# Patient Record
Sex: FEMALE | Race: WHITE | NOT HISPANIC OR LATINO | Employment: OTHER | URBAN - METROPOLITAN AREA
[De-identification: names, ages, dates, MRNs, and addresses within clinical notes are randomized per-mention and may not be internally consistent; named-entity substitution may affect disease eponyms.]

---

## 2017-01-21 ENCOUNTER — HOSPITAL ENCOUNTER (OUTPATIENT)
Dept: RADIOLOGY | Facility: MEDICAL CENTER | Age: 67
End: 2017-01-21
Attending: FAMILY MEDICINE
Payer: MEDICARE

## 2017-01-21 ENCOUNTER — HOSPITAL ENCOUNTER (OUTPATIENT)
Dept: LAB | Facility: MEDICAL CENTER | Age: 67
End: 2017-01-21
Attending: FAMILY MEDICINE
Payer: MEDICARE

## 2017-01-21 ENCOUNTER — HOSPITAL ENCOUNTER (OUTPATIENT)
Facility: MEDICAL CENTER | Age: 67
End: 2017-01-21
Attending: FAMILY MEDICINE
Payer: MEDICARE

## 2017-01-21 DIAGNOSIS — R05.9 COUGH: ICD-10-CM

## 2017-01-21 LAB
BASOPHILS # BLD AUTO: 0.02 K/UL (ref 0–0.12)
BASOPHILS NFR BLD AUTO: 0.3 % (ref 0–1.8)
EOSINOPHIL # BLD: 0.11 K/UL (ref 0–0.51)
EOSINOPHIL NFR BLD AUTO: 1.6 % (ref 0–6.9)
ERYTHROCYTE [DISTWIDTH] IN BLOOD BY AUTOMATED COUNT: 48.3 FL (ref 35.9–50)
FERRITIN SERPL-MCNC: 23.2 NG/ML (ref 10–291)
HCT VFR BLD AUTO: 44.7 % (ref 37–47)
HGB BLD-MCNC: 14.2 G/DL (ref 12–16)
IMM GRANULOCYTES # BLD AUTO: 0.01 K/UL (ref 0–0.11)
IMM GRANULOCYTES NFR BLD AUTO: 0.1 % (ref 0–0.9)
IRON SATN MFR SERPL: 21 % (ref 15–55)
IRON SERPL-MCNC: 85 UG/DL (ref 40–170)
LYMPHOCYTES # BLD: 2.04 K/UL (ref 1–4.8)
LYMPHOCYTES NFR BLD AUTO: 30.4 % (ref 22–41)
MCH RBC QN AUTO: 29.9 PG (ref 27–33)
MCHC RBC AUTO-ENTMCNC: 31.8 G/DL (ref 33.6–35)
MCV RBC AUTO: 94.1 FL (ref 81.4–97.8)
MONOCYTES # BLD: 0.42 K/UL (ref 0–0.85)
MONOCYTES NFR BLD AUTO: 6.3 % (ref 0–13.4)
NEUTROPHILS # BLD: 4.1 K/UL (ref 2–7.15)
NEUTROPHILS NFR BLD AUTO: 61.3 % (ref 44–72)
NRBC # BLD AUTO: 0 K/UL
NRBC BLD-RTO: 0 /100 WBC
PLATELET # BLD AUTO: 187 K/UL (ref 164–446)
PMV BLD AUTO: 10.9 FL (ref 9–12.9)
RBC # BLD AUTO: 4.75 M/UL (ref 4.2–5.4)
TIBC SERPL-MCNC: 412 UG/DL (ref 250–450)
VIT B12 SERPL-MCNC: 1071 PG/ML (ref 211–911)
WBC # BLD AUTO: 6.7 K/UL (ref 4.8–10.8)

## 2017-01-21 PROCEDURE — 36415 COLL VENOUS BLD VENIPUNCTURE: CPT

## 2017-01-21 PROCEDURE — 82728 ASSAY OF FERRITIN: CPT

## 2017-01-21 PROCEDURE — 83550 IRON BINDING TEST: CPT

## 2017-01-21 PROCEDURE — 71020 DX-CHEST-2 VIEWS: CPT

## 2017-01-21 PROCEDURE — 82274 ASSAY TEST FOR BLOOD FECAL: CPT

## 2017-01-21 PROCEDURE — 85025 COMPLETE CBC W/AUTO DIFF WBC: CPT

## 2017-01-21 PROCEDURE — 83540 ASSAY OF IRON: CPT

## 2017-01-21 PROCEDURE — 82607 VITAMIN B-12: CPT

## 2017-01-26 LAB — HEMOCCULT STL QL IA: NEGATIVE

## 2018-01-04 ENCOUNTER — APPOINTMENT (OUTPATIENT)
Dept: RADIOLOGY | Facility: MEDICAL CENTER | Age: 68
End: 2018-01-04
Attending: NURSE PRACTITIONER
Payer: MEDICARE

## 2018-01-19 ENCOUNTER — HOSPITAL ENCOUNTER (OUTPATIENT)
Dept: LAB | Facility: MEDICAL CENTER | Age: 68
End: 2018-01-19
Attending: NURSE PRACTITIONER
Payer: MEDICARE

## 2018-01-19 ENCOUNTER — HOSPITAL ENCOUNTER (OUTPATIENT)
Dept: RADIOLOGY | Facility: MEDICAL CENTER | Age: 68
End: 2018-01-19
Attending: NURSE PRACTITIONER
Payer: MEDICARE

## 2018-01-19 DIAGNOSIS — F06.30 POSTMENOPAUSAL RELATED MOOD DISORDER: ICD-10-CM

## 2018-01-19 DIAGNOSIS — Z87.891 PERSONAL HISTORY OF TOBACCO USE, PRESENTING HAZARDS TO HEALTH: ICD-10-CM

## 2018-01-19 DIAGNOSIS — N95.8 POSTMENOPAUSAL RELATED MOOD DISORDER: ICD-10-CM

## 2018-01-19 LAB
ALBUMIN SERPL BCP-MCNC: 2.8 G/DL (ref 3.2–4.9)
ALBUMIN/GLOB SERPL: 0.9 G/DL
ALP SERPL-CCNC: 83 U/L (ref 30–99)
ALT SERPL-CCNC: 11 U/L (ref 2–50)
ANION GAP SERPL CALC-SCNC: 7 MMOL/L (ref 0–11.9)
AST SERPL-CCNC: 11 U/L (ref 12–45)
BASOPHILS # BLD AUTO: 0.5 % (ref 0–1.8)
BASOPHILS # BLD: 0.03 K/UL (ref 0–0.12)
BILIRUB SERPL-MCNC: 0.4 MG/DL (ref 0.1–1.5)
BUN SERPL-MCNC: 11 MG/DL (ref 8–22)
CALCIUM SERPL-MCNC: 8.8 MG/DL (ref 8.5–10.5)
CHLORIDE SERPL-SCNC: 102 MMOL/L (ref 96–112)
CHOLEST SERPL-MCNC: 137 MG/DL (ref 100–199)
CO2 SERPL-SCNC: 27 MMOL/L (ref 20–33)
CREAT SERPL-MCNC: 0.64 MG/DL (ref 0.5–1.4)
CREAT UR-MCNC: 64.4 MG/DL
EOSINOPHIL # BLD AUTO: 0.12 K/UL (ref 0–0.51)
EOSINOPHIL NFR BLD: 1.9 % (ref 0–6.9)
ERYTHROCYTE [DISTWIDTH] IN BLOOD BY AUTOMATED COUNT: 48.8 FL (ref 35.9–50)
EST. AVERAGE GLUCOSE BLD GHB EST-MCNC: 209 MG/DL
GLOBULIN SER CALC-MCNC: 3.2 G/DL (ref 1.9–3.5)
GLUCOSE SERPL-MCNC: 208 MG/DL (ref 65–99)
HBA1C MFR BLD: 8.9 % (ref 0–5.6)
HCT VFR BLD AUTO: 42.8 % (ref 37–47)
HCV AB SER QL: NEGATIVE
HDLC SERPL-MCNC: 56 MG/DL
HGB BLD-MCNC: 13.8 G/DL (ref 12–16)
IMM GRANULOCYTES # BLD AUTO: 0.03 K/UL (ref 0–0.11)
IMM GRANULOCYTES NFR BLD AUTO: 0.5 % (ref 0–0.9)
LDLC SERPL CALC-MCNC: 69 MG/DL
LYMPHOCYTES # BLD AUTO: 1.48 K/UL (ref 1–4.8)
LYMPHOCYTES NFR BLD: 23.8 % (ref 22–41)
MCH RBC QN AUTO: 29.7 PG (ref 27–33)
MCHC RBC AUTO-ENTMCNC: 32.2 G/DL (ref 33.6–35)
MCV RBC AUTO: 92.2 FL (ref 81.4–97.8)
MICROALBUMIN UR-MCNC: 0.7 MG/DL
MICROALBUMIN/CREAT UR: 11 MG/G (ref 0–30)
MONOCYTES # BLD AUTO: 0.32 K/UL (ref 0–0.85)
MONOCYTES NFR BLD AUTO: 5.1 % (ref 0–13.4)
NEUTROPHILS # BLD AUTO: 4.24 K/UL (ref 2–7.15)
NEUTROPHILS NFR BLD: 68.2 % (ref 44–72)
NRBC # BLD AUTO: 0 K/UL
NRBC BLD-RTO: 0 /100 WBC
PLATELET # BLD AUTO: 133 K/UL (ref 164–446)
PMV BLD AUTO: 11.6 FL (ref 9–12.9)
POTASSIUM SERPL-SCNC: 4.4 MMOL/L (ref 3.6–5.5)
PROT SERPL-MCNC: 6 G/DL (ref 6–8.2)
RBC # BLD AUTO: 4.64 M/UL (ref 4.2–5.4)
SODIUM SERPL-SCNC: 136 MMOL/L (ref 135–145)
T4 FREE SERPL-MCNC: 0.83 NG/DL (ref 0.53–1.43)
TRIGL SERPL-MCNC: 61 MG/DL (ref 0–149)
TSH SERPL DL<=0.005 MIU/L-ACNC: 0.48 UIU/ML (ref 0.38–5.33)
WBC # BLD AUTO: 6.2 K/UL (ref 4.8–10.8)

## 2018-01-19 PROCEDURE — 83036 HEMOGLOBIN GLYCOSYLATED A1C: CPT

## 2018-01-19 PROCEDURE — 82570 ASSAY OF URINE CREATININE: CPT

## 2018-01-19 PROCEDURE — 85025 COMPLETE CBC W/AUTO DIFF WBC: CPT

## 2018-01-19 PROCEDURE — 86803 HEPATITIS C AB TEST: CPT

## 2018-01-19 PROCEDURE — 84443 ASSAY THYROID STIM HORMONE: CPT

## 2018-01-19 PROCEDURE — 76775 US EXAM ABDO BACK WALL LIM: CPT

## 2018-01-19 PROCEDURE — 36415 COLL VENOUS BLD VENIPUNCTURE: CPT

## 2018-01-19 PROCEDURE — 80061 LIPID PANEL: CPT

## 2018-01-19 PROCEDURE — 80053 COMPREHEN METABOLIC PANEL: CPT

## 2018-01-19 PROCEDURE — 82043 UR ALBUMIN QUANTITATIVE: CPT

## 2018-01-19 PROCEDURE — 84439 ASSAY OF FREE THYROXINE: CPT

## 2018-01-25 ENCOUNTER — HOSPITAL ENCOUNTER (OUTPATIENT)
Dept: RADIOLOGY | Facility: MEDICAL CENTER | Age: 68
End: 2018-01-25
Attending: NURSE PRACTITIONER
Payer: MEDICARE

## 2018-01-25 DIAGNOSIS — Z87.891 PERSONAL HISTORY OF TOBACCO USE, PRESENTING HAZARDS TO HEALTH: ICD-10-CM

## 2018-01-25 DIAGNOSIS — N95.8 POSTMENOPAUSAL RELATED MOOD DISORDER: ICD-10-CM

## 2018-01-25 DIAGNOSIS — F06.30 POSTMENOPAUSAL RELATED MOOD DISORDER: ICD-10-CM

## 2018-01-25 PROCEDURE — 77080 DXA BONE DENSITY AXIAL: CPT

## 2018-06-27 ENCOUNTER — HOSPITAL ENCOUNTER (OUTPATIENT)
Dept: RADIOLOGY | Facility: MEDICAL CENTER | Age: 68
End: 2018-06-27
Attending: NURSE PRACTITIONER
Payer: MEDICARE

## 2018-06-27 DIAGNOSIS — M25.572 LEFT ANKLE PAIN, UNSPECIFIED CHRONICITY: ICD-10-CM

## 2018-06-27 DIAGNOSIS — M79.672 LEFT FOOT PAIN: ICD-10-CM

## 2018-06-27 PROCEDURE — 73610 X-RAY EXAM OF ANKLE: CPT | Mod: LT

## 2018-06-27 PROCEDURE — 73630 X-RAY EXAM OF FOOT: CPT | Mod: LT

## 2018-09-15 ENCOUNTER — OFFICE VISIT (OUTPATIENT)
Dept: URGENT CARE | Facility: PHYSICIAN GROUP | Age: 68
End: 2018-09-15
Payer: MEDICARE

## 2018-09-15 ENCOUNTER — HOSPITAL ENCOUNTER (OUTPATIENT)
Dept: RADIOLOGY | Facility: MEDICAL CENTER | Age: 68
End: 2018-09-15
Attending: FAMILY MEDICINE
Payer: MEDICARE

## 2018-09-15 VITALS
OXYGEN SATURATION: 91 % | HEART RATE: 68 BPM | WEIGHT: 195 LBS | TEMPERATURE: 97.7 F | BODY MASS INDEX: 29.55 KG/M2 | DIASTOLIC BLOOD PRESSURE: 70 MMHG | HEIGHT: 68 IN | RESPIRATION RATE: 24 BRPM | SYSTOLIC BLOOD PRESSURE: 132 MMHG

## 2018-09-15 DIAGNOSIS — J45.41 MODERATE PERSISTENT ASTHMA WITH EXACERBATION: ICD-10-CM

## 2018-09-15 DIAGNOSIS — J44.1 COPD EXACERBATION (HCC): ICD-10-CM

## 2018-09-15 DIAGNOSIS — R09.02 HYPOXIA: ICD-10-CM

## 2018-09-15 PROCEDURE — 94640 AIRWAY INHALATION TREATMENT: CPT | Performed by: FAMILY MEDICINE

## 2018-09-15 PROCEDURE — 71046 X-RAY EXAM CHEST 2 VIEWS: CPT

## 2018-09-15 PROCEDURE — 99214 OFFICE O/P EST MOD 30 MIN: CPT | Mod: 25 | Performed by: FAMILY MEDICINE

## 2018-09-15 RX ORDER — LEVOTHYROXINE SODIUM 0.07 MG/1
TABLET ORAL
COMMUNITY
Start: 2018-09-01 | End: 2020-07-27 | Stop reason: SDUPTHER

## 2018-09-15 RX ORDER — CARBAMAZEPINE 200 MG/1
TABLET ORAL
COMMUNITY
Start: 2018-08-08 | End: 2019-06-05 | Stop reason: SINTOL

## 2018-09-15 RX ORDER — PREDNISONE 20 MG/1
40 TABLET ORAL DAILY
Qty: 10 TAB | Refills: 0 | Status: SHIPPED | OUTPATIENT
Start: 2018-09-15 | End: 2018-09-20

## 2018-09-15 RX ORDER — IPRATROPIUM BROMIDE AND ALBUTEROL SULFATE 2.5; .5 MG/3ML; MG/3ML
3 SOLUTION RESPIRATORY (INHALATION) ONCE
Status: COMPLETED | OUTPATIENT
Start: 2018-09-15 | End: 2018-09-15

## 2018-09-15 RX ORDER — IPRATROPIUM BROMIDE AND ALBUTEROL SULFATE 2.5; .5 MG/3ML; MG/3ML
3 SOLUTION RESPIRATORY (INHALATION) EVERY 6 HOURS PRN
Qty: 25 BULLET | Refills: 0 | Status: SHIPPED | OUTPATIENT
Start: 2018-09-15 | End: 2024-01-10 | Stop reason: SDUPTHER

## 2018-09-15 RX ORDER — METHYLPREDNISOLONE SODIUM SUCCINATE 125 MG/2ML
125 INJECTION, POWDER, LYOPHILIZED, FOR SOLUTION INTRAMUSCULAR; INTRAVENOUS ONCE
Status: COMPLETED | OUTPATIENT
Start: 2018-09-15 | End: 2018-09-15

## 2018-09-15 RX ORDER — GABAPENTIN 300 MG/1
CAPSULE ORAL
COMMUNITY
Start: 2018-09-06 | End: 2019-06-05

## 2018-09-15 RX ORDER — OMEPRAZOLE 40 MG/1
CAPSULE, DELAYED RELEASE ORAL
COMMUNITY
Start: 2018-06-29 | End: 2020-10-28 | Stop reason: CLARIF

## 2018-09-15 RX ORDER — CAPTOPRIL 12.5 MG/1
12.5 TABLET ORAL DAILY
COMMUNITY
Start: 2018-06-29 | End: 2022-10-14

## 2018-09-15 RX ORDER — CIPROFLOXACIN 500 MG/1
TABLET, FILM COATED ORAL
COMMUNITY
Start: 2018-09-01 | End: 2019-06-05

## 2018-09-15 RX ORDER — DOXYCYCLINE HYCLATE 100 MG
100 TABLET ORAL 2 TIMES DAILY
Qty: 20 TAB | Refills: 0 | Status: SHIPPED | OUTPATIENT
Start: 2018-09-15 | End: 2018-09-25

## 2018-09-15 RX ADMIN — IPRATROPIUM BROMIDE AND ALBUTEROL SULFATE 3 ML: 2.5; .5 SOLUTION RESPIRATORY (INHALATION) at 14:26

## 2018-09-15 RX ADMIN — METHYLPREDNISOLONE SODIUM SUCCINATE 125 MG: 125 INJECTION, POWDER, LYOPHILIZED, FOR SOLUTION INTRAMUSCULAR; INTRAVENOUS at 14:38

## 2018-09-15 ASSESSMENT — ENCOUNTER SYMPTOMS
EYE REDNESS: 0
EYE DISCHARGE: 0
WEIGHT LOSS: 0

## 2018-09-15 ASSESSMENT — PAIN SCALES - GENERAL: PAINLEVEL: NO PAIN
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
## 2019-06-04 ENCOUNTER — HOSPITAL ENCOUNTER (OUTPATIENT)
Dept: LAB | Facility: MEDICAL CENTER | Age: 69
End: 2019-06-04
Attending: FAMILY MEDICINE
Payer: MEDICARE

## 2019-06-04 LAB
ALBUMIN SERPL BCP-MCNC: 3.9 G/DL (ref 3.2–4.9)
ALBUMIN/GLOB SERPL: 1.6 G/DL
ALP SERPL-CCNC: 90 U/L (ref 30–99)
ALT SERPL-CCNC: 15 U/L (ref 2–50)
ANION GAP SERPL CALC-SCNC: 8 MMOL/L (ref 0–11.9)
APPEARANCE UR: CLEAR
AST SERPL-CCNC: 14 U/L (ref 12–45)
BASOPHILS # BLD AUTO: 0.6 % (ref 0–1.8)
BASOPHILS # BLD: 0.04 K/UL (ref 0–0.12)
BILIRUB SERPL-MCNC: 0.6 MG/DL (ref 0.1–1.5)
BILIRUB UR QL STRIP.AUTO: NEGATIVE
BUN SERPL-MCNC: 12 MG/DL (ref 8–22)
CALCIUM SERPL-MCNC: 9 MG/DL (ref 8.5–10.5)
CHLORIDE SERPL-SCNC: 101 MMOL/L (ref 96–112)
CHOLEST SERPL-MCNC: 168 MG/DL (ref 100–199)
CO2 SERPL-SCNC: 27 MMOL/L (ref 20–33)
COLOR UR: YELLOW
CREAT SERPL-MCNC: 0.69 MG/DL (ref 0.5–1.4)
CREAT UR-MCNC: 52.2 MG/DL
EOSINOPHIL # BLD AUTO: 0.12 K/UL (ref 0–0.51)
EOSINOPHIL NFR BLD: 1.7 % (ref 0–6.9)
ERYTHROCYTE [DISTWIDTH] IN BLOOD BY AUTOMATED COUNT: 46.7 FL (ref 35.9–50)
EST. AVERAGE GLUCOSE BLD GHB EST-MCNC: 255 MG/DL
FASTING STATUS PATIENT QL REPORTED: NORMAL
GLOBULIN SER CALC-MCNC: 2.5 G/DL (ref 1.9–3.5)
GLUCOSE SERPL-MCNC: 130 MG/DL (ref 65–99)
GLUCOSE UR STRIP.AUTO-MCNC: 500 MG/DL
HBA1C MFR BLD: 10.5 % (ref 0–5.6)
HCT VFR BLD AUTO: 44 % (ref 37–47)
HDLC SERPL-MCNC: 82 MG/DL
HGB BLD-MCNC: 14.5 G/DL (ref 12–16)
IMM GRANULOCYTES # BLD AUTO: 0.03 K/UL (ref 0–0.11)
IMM GRANULOCYTES NFR BLD AUTO: 0.4 % (ref 0–0.9)
KETONES UR STRIP.AUTO-MCNC: NEGATIVE MG/DL
LDLC SERPL CALC-MCNC: 69 MG/DL
LEUKOCYTE ESTERASE UR QL STRIP.AUTO: NEGATIVE
LYMPHOCYTES # BLD AUTO: 1.91 K/UL (ref 1–4.8)
LYMPHOCYTES NFR BLD: 26.6 % (ref 22–41)
MCH RBC QN AUTO: 30.3 PG (ref 27–33)
MCHC RBC AUTO-ENTMCNC: 33 G/DL (ref 33.6–35)
MCV RBC AUTO: 91.9 FL (ref 81.4–97.8)
MICRO URNS: ABNORMAL
MICROALBUMIN UR-MCNC: <0.7 MG/DL
MICROALBUMIN/CREAT UR: NORMAL MG/G (ref 0–30)
MONOCYTES # BLD AUTO: 0.4 K/UL (ref 0–0.85)
MONOCYTES NFR BLD AUTO: 5.6 % (ref 0–13.4)
NEUTROPHILS # BLD AUTO: 4.68 K/UL (ref 2–7.15)
NEUTROPHILS NFR BLD: 65.1 % (ref 44–72)
NITRITE UR QL STRIP.AUTO: NEGATIVE
NRBC # BLD AUTO: 0 K/UL
NRBC BLD-RTO: 0 /100 WBC
PH UR STRIP.AUTO: 7.5 [PH]
PLATELET # BLD AUTO: 200 K/UL (ref 164–446)
PMV BLD AUTO: 10.9 FL (ref 9–12.9)
POTASSIUM SERPL-SCNC: 3.9 MMOL/L (ref 3.6–5.5)
PROT SERPL-MCNC: 6.4 G/DL (ref 6–8.2)
PROT UR QL STRIP: NEGATIVE MG/DL
RBC # BLD AUTO: 4.79 M/UL (ref 4.2–5.4)
RBC UR QL AUTO: NEGATIVE
SODIUM SERPL-SCNC: 136 MMOL/L (ref 135–145)
SP GR UR STRIP.AUTO: 1.01
T3FREE SERPL-MCNC: 2.79 PG/ML (ref 2.4–4.2)
T4 FREE SERPL-MCNC: 0.92 NG/DL (ref 0.53–1.43)
TRIGL SERPL-MCNC: 86 MG/DL (ref 0–149)
TSH SERPL DL<=0.005 MIU/L-ACNC: 1.06 UIU/ML (ref 0.38–5.33)
UROBILINOGEN UR STRIP.AUTO-MCNC: 0.2 MG/DL
WBC # BLD AUTO: 7.2 K/UL (ref 4.8–10.8)

## 2019-06-04 PROCEDURE — 80061 LIPID PANEL: CPT

## 2019-06-04 PROCEDURE — 83036 HEMOGLOBIN GLYCOSYLATED A1C: CPT

## 2019-06-04 PROCEDURE — 82043 UR ALBUMIN QUANTITATIVE: CPT

## 2019-06-04 PROCEDURE — 85025 COMPLETE CBC W/AUTO DIFF WBC: CPT

## 2019-06-04 PROCEDURE — 84481 FREE ASSAY (FT-3): CPT

## 2019-06-04 PROCEDURE — 84443 ASSAY THYROID STIM HORMONE: CPT

## 2019-06-04 PROCEDURE — 80053 COMPREHEN METABOLIC PANEL: CPT

## 2019-06-04 PROCEDURE — 84439 ASSAY OF FREE THYROXINE: CPT

## 2019-06-04 PROCEDURE — 36415 COLL VENOUS BLD VENIPUNCTURE: CPT

## 2019-06-04 PROCEDURE — 82570 ASSAY OF URINE CREATININE: CPT

## 2019-06-04 PROCEDURE — 81003 URINALYSIS AUTO W/O SCOPE: CPT

## 2019-06-05 ENCOUNTER — OFFICE VISIT (OUTPATIENT)
Dept: ENDOCRINOLOGY | Facility: MEDICAL CENTER | Age: 69
End: 2019-06-05
Payer: MEDICARE

## 2019-06-05 ENCOUNTER — APPOINTMENT (OUTPATIENT)
Dept: ENDOCRINOLOGY | Facility: MEDICAL CENTER | Age: 69
End: 2019-06-05
Payer: MEDICARE

## 2019-06-05 VITALS
HEART RATE: 79 BPM | HEIGHT: 68 IN | WEIGHT: 214 LBS | BODY MASS INDEX: 32.43 KG/M2 | OXYGEN SATURATION: 91 % | DIASTOLIC BLOOD PRESSURE: 78 MMHG | SYSTOLIC BLOOD PRESSURE: 128 MMHG

## 2019-06-05 DIAGNOSIS — E03.9 ACQUIRED HYPOTHYROIDISM: Chronic | ICD-10-CM

## 2019-06-05 PROCEDURE — 99214 OFFICE O/P EST MOD 30 MIN: CPT | Performed by: INTERNAL MEDICINE

## 2019-06-05 RX ORDER — GABAPENTIN 400 MG/1
800 CAPSULE ORAL 2 TIMES DAILY
COMMUNITY
End: 2019-08-12 | Stop reason: SINTOL

## 2019-06-05 RX ORDER — CHOLECALCIFEROL (VITAMIN D3) 50 MCG
4000 TABLET ORAL DAILY
COMMUNITY

## 2019-06-05 RX ORDER — MONTELUKAST SODIUM 5 MG/1
5 TABLET, CHEWABLE ORAL NIGHTLY
COMMUNITY
End: 2019-11-18 | Stop reason: CLARIF

## 2019-06-06 NOTE — PROGRESS NOTES
Endocrinology Clinic Progress Note  PCP: Blaire Perez M.D.    HPI:  Michelle Espinoza is a 68 y.o. old patient who comes in today for review of Management of Uncontrolled Type 2 Diabetes and acquired hypothyroid.          Most Recent HbA1c:   Lab Results   Component Value Date/Time    HBA1C 10.5 (H) 06/04/2019 09:07 AM        Current Diabetes Regimen:  NPH 35 units in the morning and 15 units at hs  Regular insulin, using 2 units for every 50 points above 100.  Often forgets to give before the meal and takes about an hour after the meal.     Testing blood sugars 3-4 per day  Before Breakfast: states running in the 200+ range.   After Lunch: usually in the 300-400 range  After Dinner: usually in the 300-400 range.       Hypoglycemia:  Occasional    ROS:  Constitutional: No weight loss  Cardiac: No palpitations or racing heart  Resp: No shortness of breath  Neuro: No numbness or tinging in feet  Endo: No heat or cold intolerance, no polyuria or polydipsia  All other systems were reviewed and were negative.    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, uncontrolled ([facility]) 08/22/2014   • Diabetic polyneuropathy ([facility]) 08/22/2014   • Disorder of bone and cartilage 04/23/2014   • Fitting and adjustment of insulin pump 04/23/2014   • Encounter for long-term (current) use of insulin ([facility]) 04/23/2014   • Low back pain 02/06/2014   • Hypothyroid 09/10/2013   • Obesity 09/10/2013   • Postoperative anemia due to acute blood loss 09/10/2013   • Hip pain 09/09/2013   • Hypertension 08/22/2013   • Dyslipidemia 08/22/2013   • Vitamin d deficiency 08/22/2013      Class: Acute   • Uncontrolled type 1 diabetes mellitus (HCC) 08/22/2013       Past Surgical History:  Past Surgical History:   Procedure Laterality Date   • HIP ARTHROPLASTY TOTAL  9/9/2013    Performed by Pedro Warren M.D. at [facility]   • BREAST RECONSTRUCTION  2001   • MASTECTOMY  1992    right, full   • MASTECTOMY  1987, 2001    partial, left x2       Allergies:  Percocet [oxycodone-acetaminophen]    Social History:  Social History     Social History   • Marital status:      Spouse name: N/A   • Number of children: N/A   • Years of education: N/A     Occupational History   • Not on file.     Social History Main Topics   • Smoking status: Current Every Day Smoker     Packs/day: 1.00     Years: 40.00     Types: Cigarettes   • Smokeless tobacco: Never Used   • Alcohol use Yes      Comment: 2 per day   • Drug use: No   • Sexual activity: Not on file     Other Topics Concern   • Not on file     Social History Narrative   • No narrative on file       Family History:  Family History   Problem Relation Age of Onset   • Lung Disease Unknown        Medications:    Current Outpatient Prescriptions:   •  insulin NPH (HUMULIN,NOVOLIN) 100 UNIT/ML Suspension, Inject 15-35 Units as instructed. Taking 15 units at hs and 35 units in the morning, Disp: , Rfl:   •  insulin regular (HUMULIN R) 100 Unit/mL Solution, Inject  as instructed 3 times a day before meals. Using 2 units for every 50 points blood sugar is greater than 100, Disp: , Rfl:   •  gabapentin (NEURONTIN) 400 MG Cap, Take 800 mg by mouth 2 times a day., Disp: , Rfl:   •  montelukast (SINGULAIR) 5 MG Chew Tab, Take 5 mg by mouth every evening., Disp: , Rfl:   •  Cyanocobalamin (VITAMIN B 12 PO), Take 1,000 mcg by mouth., Disp: , Rfl:   •  vitamin D (CHOLECALCIFEROL) 1000 UNIT Tab, Take 1,000 Units by mouth every day., Disp: , Rfl:   •  LECITHIN PO, Take 1,200 mg by mouth 3 times a day., Disp: , Rfl:   •  Cranberry 1000 MG Cap, Take  by  "mouth., Disp: , Rfl:   •  captopril (CAPOTEN) 12.5 MG Tab, , Disp: , Rfl:   •  levothyroxine (SYNTHROID) 75 MCG Tab, , Disp: , Rfl:   •  omeprazole (PRILOSEC) 40 MG delayed-release capsule, , Disp: , Rfl:   •  Turmeric Curcumin 500 MG Cap, Take 500 mg by mouth 2 Times a Day., Disp: , Rfl:   •  B Complex Cap, Take 1 Cap by mouth every day., Disp: , Rfl:   •  celecoxib (CELEBREX) 200 MG CAPS, Take 200 mg by mouth every day., Disp: , Rfl:   •  fluoxetine (PROZAC) 20 MG CAPS, Take 20 mg by mouth every day., Disp: , Rfl:   •  raloxifene (EVISTA) 60 MG TABS, Take 60 mg by mouth every day., Disp: , Rfl:   •  tiotropium (SPIRIVA HANDIHALER) 18 MCG CAPS, Inhale 18 mcg by mouth every day., Disp: , Rfl:   •  aspirin EC (ECOTRIN) 81 MG TBEC, Take 81 mg by mouth every day., Disp: , Rfl:   •  lovastatin (MEVACOR) 20 MG TABS, Take 20 mg by mouth every evening., Disp: , Rfl:   •  beclomethasone (QVAR) 80 MCG/ACT inhaler, Inhale 2 Puffs by mouth 2 times a day. Indications: Chronic Bronchitis with Asthma, Disp: , Rfl:   •  ipratropium-albuterol (DUONEB) 0.5-2.5 (3) MG/3ML nebulizer solution, 3 mL by Nebulization route every 6 hours as needed for Shortness of Breath., Disp: 25 Bullet, Rfl: 0  •  albuterol 108 (90 BASE) MCG/ACT Aero Soln inhalation aerosol, Inhale 2 Puffs by mouth every 6 hours as needed for Shortness of Breath., Disp: , Rfl:   •  cetirizine (ZYRTEC) 10 MG Tab, Take 10 mg by mouth every day., Disp: , Rfl:   •  acetaminophen (TYLENOL) 500 MG Tab, Take 500-1,000 mg by mouth every 6 hours as needed for Moderate Pain., Disp: , Rfl:   •  benzonatate (TESSALON PERLES) 100 MG Cap, Take 1 Cap by mouth 3 times a day as needed for Cough., Disp: 30 Cap, Rfl: 0  •  albuterol (PROVENTIL) 2.5mg/3ml NEBU, 2.5 mg by Nebulization route every four hours as needed for Shortness of Breath. Dose unknown, Disp: , Rfl:     Labs: Reviewed    Physical Examination:  Vital signs: /78   Pulse 79   Ht 1.727 m (5' 8\")   Wt 97.1 kg (214 lb) "   SpO2 91%   BMI 32.54 kg/m²  Body mass index is 32.54 kg/m².  General: No apparent distress, cooperative  Eyes: No scleral icterus or discharge  ENMT: Normal on external inspection of nose, lips, normal thyroid exam  Neck: No abnormal masses on inspection  Resp: Normal effort, clear to auscultation bilaterally   CVS: Regular rate and rhythm, S1 S2 normal, no murmur   Extremities: No edema  Abdomen: abdominal obesity present  Neuro: Alert and oriented  Skin: No rash  Psych: Normal mood and affect, intact memory and able to make informed decisions    Assessment and Plan:    1. Type 1 diabetes mellitus with neurological manifestations, uncontrolled ([facility])  Advised to do prandial insulin:  1 unit for 6 grams of carbs with meals along with 2 units for 50 points above 150 mg/dl. She will start carb counting again. Once well-versed with carb counting and starts checking blood sugars 4-6 times daily, then she will benefit from insulin pump therapy.Information about pump provided to the patient.     2. Acquired hypothyroidism  Continue levothyroxine daily.     Return in about 2 months (around 8/5/2019).    Thank you for allowing me to participate in the care of this patient.    Ger Moreno  06/05/19    CC:   Blaire Perez M.D.    This note was created using voice recognition software (Dragon). The accuracy of the dictation is limited by the abilities of the software. I have reviewed the note prior to signing, however some errors in grammar and context are still possible. If you have any questions related to this note please do not hesitate to contact our office.   This note was scribed by Jena Cruz RN, CDE

## 2019-08-11 NOTE — PROGRESS NOTES
Endocrinology Clinic Progress Note  PCP: Blaire Perez M.D.    HPI:  Michelle Espinoza is a 68 y.o. old patient who comes in today for routine follow up of Uncontrolled Type 1 Diabetes and Hypothyroidism.  She is worried about the cost of her medications and insulin.  She can not afford the insulin pump.    Hypothyroidism:  Currently taking Levothyroxine 75 mcg daily.    HPI:  Michelle Espinoza is a 68 y.o. old patient who comes in today for evaluation of above stated problem.    Most Recent HbA1c:   Lab Results   Component Value Date/Time    HBA1C 10.5 (H) 06/04/2019 09:07 AM        Current Diabetes Regimen:    Basal Insulin: NPH 35 in the am and 15 units at HS  Prandial Insulin: Regular 1 unit: 6 grams carbohydrates and 2:50>150 correction.     Testing blood sugars 2 to 4 times daily for the last 90 days and will need to test before meals and bedtime for a lifetime.  Blood sugars     Hypoglycemia:  Waking up in the 50's.    ROS:  Constitutional: No weight loss  Cardiac: No palpitations or racing heart  Resp: No shortness of breath  Neuro: No numbness or tinging in feet  Endo: No heat or cold intolerance, no polyuria or polydipsia  All other systems were reviewed and were negative.    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, uncontrolled ([facility]) 08/22/2014   • Diabetic polyneuropathy ([facility]) 08/22/2014   • Disorder of bone and cartilage 04/23/2014   • Fitting and adjustment of insulin pump 04/23/2014   • Encounter for long-term (current) use of insulin ([facility]) 04/23/2014   • Low back pain 02/06/2014   • Hypothyroid 09/10/2013   • Obesity 09/10/2013    • Postoperative anemia due to acute blood loss 09/10/2013   • Hip pain 09/09/2013   • Hypertension 08/22/2013   • Dyslipidemia 08/22/2013   • Vitamin d deficiency 08/22/2013     Class: Acute   • Uncontrolled type 1 diabetes mellitus (HCC) 08/22/2013       Past Surgical History:  Past Surgical History:   Procedure Laterality Date   • HIP ARTHROPLASTY TOTAL  9/9/2013    Performed by Pedro [facility] M[facility]D. at [facility]   • BREAST RECONSTRUCTION  2001   • MASTECTOMY  1992    right, full   • MASTECTOMY  1987, 2001    partial, left x2       Allergies:  Bactrim [sulfamethoxazole w-trimethoprim] and Percocet [oxycodone-acetaminophen]    Social History:  Social History     Socioeconomic History   • Marital status:      Spouse name: Not on file   • Number of children: Not on file   • Years of education: Not on file   • Highest education level: Not on file   Occupational History   • Not on file   Social Needs   • Financial resource strain: Not on file   • Food insecurity:     Worry: Not on file     Inability: Not on file   • Transportation needs:     Medical: Not on file     Non-medical: Not on file   Tobacco Use   • Smoking status: Current Every Day Smoker     Packs/day: 1.00     Years: 40.00     Pack years: 40.00     Types: Cigarettes   • Smokeless tobacco: Never Used   Substance and Sexual Activity   • Alcohol use: Yes     Comment: 2 per day   • Drug use: No   • Sexual activity: Not on file   Lifestyle   • Physical activity:     Days per week: Not on file     Minutes per session: Not on file   • Stress: Not on file   Relationships   • Social connections:     Talks on phone: Not on file     Gets together: Not on file     Attends [employer] service: Not on file     Active member of club or organization: Not on file     Attends meetings of clubs or organizations: Not on file     Relationship status: Not on file   • Intimate partner violence:     Fear of current or ex partner: Not on file     Emotionally  abused: Not on file     Physically abused: Not on file     Forced sexual activity: Not on file   Other Topics Concern   • Not on file   Social History Narrative   • Not on file       Family History:  Family History   Problem Relation Age of Onset   • Lung Disease Unknown        Medications:    Current Outpatient Medications:   •  Multiple Vitamins-Minerals (MULTIVITAMIN ADULT PO), Take  by mouth., Disp: , Rfl:   •  insulin NPH (HUMULIN,NOVOLIN) 100 UNIT/ML Suspension, Inject 15-35 Units as instructed. Taking 15 units at hs and 35 units in the morning, Disp: , Rfl:   •  insulin regular (HUMULIN R) 100 Unit/mL Solution, Inject  as instructed 3 times a day before meals. Using 2 units for every 50 points blood sugar is greater than 100, Disp: , Rfl:   •  montelukast (SINGULAIR) 5 MG Chew Tab, Take 5 mg by mouth every evening., Disp: , Rfl:   •  Cyanocobalamin (VITAMIN B 12 PO), Take 1,000 mcg by mouth., Disp: , Rfl:   •  vitamin D (CHOLECALCIFEROL) 1000 UNIT Tab, Take 1,000 Units by mouth every day., Disp: , Rfl:   •  Cranberry 1000 MG Cap, Take  by mouth., Disp: , Rfl:   •  captopril (CAPOTEN) 12.5 MG Tab, , Disp: , Rfl:   •  levothyroxine (SYNTHROID) 75 MCG Tab, , Disp: , Rfl:   •  omeprazole (PRILOSEC) 40 MG delayed-release capsule, , Disp: , Rfl:   •  ipratropium-albuterol (DUONEB) 0.5-2.5 (3) MG/3ML nebulizer solution, 3 mL by Nebulization route every 6 hours as needed for Shortness of Breath., Disp: 25 Bullet, Rfl: 0  •  albuterol 108 (90 BASE) MCG/ACT Aero Soln inhalation aerosol, Inhale 2 Puffs by mouth every 6 hours as needed for Shortness of Breath., Disp: , Rfl:   •  cetirizine (ZYRTEC) 10 MG Tab, Take 10 mg by mouth every day., Disp: , Rfl:   •  Turmeric Curcumin 500 MG Cap, Take 500 mg by mouth 2 Times a Day., Disp: , Rfl:   •  B Complex Cap, Take 1 Cap by mouth every day., Disp: , Rfl:   •  benzonatate (TESSALON PERLES) 100 MG Cap, Take 1 Cap by mouth 3 times a day as needed for Cough., Disp: 30 Cap, Rfl:  "0  •  celecoxib (CELEBREX) 200 MG CAPS, Take 200 mg by mouth every day., Disp: , Rfl:   •  albuterol (PROVENTIL) 2.5mg/3ml NEBU, 2.5 mg by Nebulization route every four hours as needed for Shortness of Breath. Dose unknown, Disp: , Rfl:   •  fluoxetine (PROZAC) 20 MG CAPS, Take 20 mg by mouth every day., Disp: , Rfl:   •  raloxifene (EVISTA) 60 MG TABS, Take 60 mg by mouth every day., Disp: , Rfl:   •  tiotropium (SPIRIVA HANDIHALER) 18 MCG CAPS, Inhale 18 mcg by mouth every day., Disp: , Rfl:   •  aspirin EC (ECOTRIN) 81 MG TBEC, Take 81 mg by mouth every day., Disp: , Rfl:   •  lovastatin (MEVACOR) 20 MG TABS, Take 20 mg by mouth every evening., Disp: , Rfl:   •  beclomethasone (QVAR) 80 MCG/ACT inhaler, Inhale 2 Puffs by mouth 2 times a day. Indications: Chronic Bronchitis with Asthma, Disp: , Rfl:     Labs: Reviewed    Physical Examination:  Vital signs: /60   Pulse 96   Ht 1.727 m (5' 8\")   Wt 94.3 kg (208 lb)   SpO2 92%   BMI 31.63 kg/m²  Body mass index is 31.63 kg/m².  General: No apparent distress, cooperative  Eyes: No scleral icterus or discharge  ENMT: Normal on external inspection of nose, lips, normal thyroid exam  Neck: No abnormal masses on inspection  Resp: Normal effort, clear to auscultation bilaterally   CVS: Regular rate and rhythm, S1 S2 normal, no murmur   Extremities: No edema  Abdomen: abdominal obesity present  Neuro: Alert and oriented  Skin: No rash  Psych: Normal mood and affect, intact memory and able to make informed decisions    Foot Exam:  Monofilament: done  Monofilament testing with a 10 gram force: sensation intact: decreased bilaterally  Visual Inspection: Feet without maceration, ulcers, fissures.  Pedal pulses: intact bilaterally    Assessment and Plan:    1. Uncontrolled type 1 diabetes mellitus with hyperglycemia (HCC)  Will try tresiba 35-40 units daily along with novolog or humalog( sample vials of both provided to the patient).Dosing of novolog as per HPI( based " on carbs and correction needed)  The following was reviewed  - Discussed diabetic diet discussed in detail-plate method.  - She will test before meals and log.  - She will walk for 20-30 minutes daily.  - Reviewed medications and advised how to take.  - Discussed importance of immunizations and yearly eye exams. 6 months ago at [facility].  - Encouraged patient to attend diabetes education program.  - Advised daily foot exams. Educated on signs of infection.   - Educated on need to stay well hydrated with water.  - Educated to call with any questions or problems.    2. Acquired hypothyroidism  Cont levothyroxine.     Return in about 3 months (around 11/12/2019).    Thank you for allowing me to participate in the care of this patient.    Dr. Gre Moreno  This note was scribed by Yusra Rios RN, CDE  08/12/19    CC:   Blaire Perez M.D.    This note was created using voice recognition software (Dragon). The accuracy of the dictation is limited by the abilities of the software. I have reviewed the note prior to signing, however some errors in grammar and context are still possible. If you have any questions related to this note please do not hesitate to contact our office.

## 2019-08-12 ENCOUNTER — OFFICE VISIT (OUTPATIENT)
Dept: ENDOCRINOLOGY | Facility: MEDICAL CENTER | Age: 69
End: 2019-08-12
Payer: MEDICARE

## 2019-08-12 VITALS
HEART RATE: 96 BPM | BODY MASS INDEX: 31.52 KG/M2 | WEIGHT: 208 LBS | OXYGEN SATURATION: 92 % | DIASTOLIC BLOOD PRESSURE: 60 MMHG | SYSTOLIC BLOOD PRESSURE: 100 MMHG | HEIGHT: 68 IN

## 2019-08-12 DIAGNOSIS — E03.9 ACQUIRED HYPOTHYROIDISM: ICD-10-CM

## 2019-08-12 DIAGNOSIS — E10.65 UNCONTROLLED TYPE 1 DIABETES MELLITUS WITH HYPERGLYCEMIA (HCC): ICD-10-CM

## 2019-08-12 PROCEDURE — 99214 OFFICE O/P EST MOD 30 MIN: CPT | Performed by: INTERNAL MEDICINE

## 2019-08-13 ENCOUNTER — TELEPHONE (OUTPATIENT)
Dept: ENDOCRINOLOGY | Facility: MEDICAL CENTER | Age: 69
End: 2019-08-13

## 2019-08-13 NOTE — TELEPHONE ENCOUNTER
VOICEMAIL    1. Caller Name: Amalia with [facility] pharmacy                          Call Back Number: [phone]    2. Message: Amalia left a vm stating that she is with [facility] pharmacy and will help patient pay for her Novolin N and Novolin R. They just need a script sent for both rx's to [facility] on [address].    Please advise      3. Patient approves office to leave a detailed voicemail/MyChart message: yes

## 2019-10-29 ENCOUNTER — HOSPITAL ENCOUNTER (OUTPATIENT)
Dept: LAB | Facility: MEDICAL CENTER | Age: 69
End: 2019-10-29
Payer: MEDICARE

## 2019-11-16 NOTE — PROGRESS NOTES
Endocrinology Clinic Progress Note  PCP: Blaire Perez M.D.    HPI:  Michelle Espinoza is a 69 y.o. old patient who comes in today for routine follow up of Management of Uncontrolled Type 1 Diabetes and Hypothyroidism.  She can not afford the insulin pump and needs help with samples for her insulin.    HPI:  Michelle Espinoza is a 69 y.o. old patient who comes in today for evaluation of above stated problem.    Most Recent HbA1c:   Lab Results   Component Value Date/Time    HBA1C 8.4 (A) 11/18/2019 01:14 PM        Current Diabetes Regimen:  Basal Insulin: Tresiba 40 units daily  Prandial Insulin: FIASP 1:6 gram carbohydrates plus a 2:50>150 correction before meals.  Testing at least 4 times daily.  Before Breakfast:    Before Lunch:  150-200  Before Dinner:  150-200    Hypoglycemia:  Had 1 in the afternoon when she didn't eat lunch.  Had a 49 during the night when she took too much short acting.    ROS:  Constitutional: No weight loss  Cardiac: No palpitations or racing heart  Resp: No shortness of breath  Neuro: No numbness or tinging in feet  Endo: No heat or cold intolerance, no polyuria or polydipsia  All other systems were reviewed and were negative.    Past Medical History:  Patient Active Problem List    Diagnosis Date Noted   • Influenza A 12/27/2016   • COPD (chronic obstructive pulmonary disease) (Formerly [facility]) 12/27/2016   • Osteoporosis 12/27/2016   • Pneumonia 12/26/2016   • Acute on chronic respiratory failure with hypoxia ([facility]) 12/26/2016   • Tobacco abuse 10/20/2016   • Insulin pump status 11/17/2014   • Type 1 diabetes mellitus with neurological manifestations, uncontrolled ([facility]) 08/22/2014   • Diabetic polyneuropathy ([facility]) 08/22/2014   • Disorder of bone and cartilage 04/23/2014   • Fitting and adjustment of insulin pump 04/23/2014   • Encounter for long-term (current) use of insulin ([facility]) 04/23/2014   • Low back pain 02/06/2014   • Hypothyroid 09/10/2013   • Obesity 09/10/2013   • Postoperative anemia due  to acute blood loss 09/10/2013   • Hip pain 09/09/2013   • Hypertension 08/22/2013   • Dyslipidemia 08/22/2013   • Vitamin d deficiency 08/22/2013     Class: Acute   • Uncontrolled type 1 diabetes mellitus (HCC) 08/22/2013       Past Surgical History:  Past Surgical History:   Procedure Laterality Date   • HIP ARTHROPLASTY TOTAL  9/9/2013    Performed by Pedro Warren M.D. at [facility]   • BREAST RECONSTRUCTION  2001   • MASTECTOMY  1992    right, full   • MASTECTOMY  1987, 2001    partial, left x2       Allergies:  Bactrim [sulfamethoxazole w-trimethoprim] and Percocet [oxycodone-acetaminophen]    Social History:  Social History     Socioeconomic History   • Marital status:      Spouse name: Not on file   • Number of children: Not on file   • Years of education: Not on file   • Highest education level: Not on file   Occupational History   • Not on file   Social Needs   • Financial resource strain: Not on file   • Food insecurity:     Worry: Not on file     Inability: Not on file   • Transportation needs:     Medical: Not on file     Non-medical: Not on file   Tobacco Use   • Smoking status: Current Every Day Smoker     Packs/day: 1.00     Years: 40.00     Pack years: 40.00     Types: Cigarettes   • Smokeless tobacco: Never Used   Substance and Sexual Activity   • Alcohol use: Yes     Comment: 2 per day   • Drug use: No   • Sexual activity: Not on file   Lifestyle   • Physical activity:     Days per week: Not on file     Minutes per session: Not on file   • Stress: Not on file   Relationships   • Social connections:     Talks on phone: Not on file     Gets together: Not on file     Attends [employer] service: Not on file     Active member of club or organization: Not on file     Attends meetings of clubs or organizations: Not on file     Relationship status: Not on file   • Intimate partner violence:     Fear of current or ex partner: Not on file     Emotionally abused: Not on file      Physically abused: Not on file     Forced sexual activity: Not on file   Other Topics Concern   • Not on file   Social History Narrative   • Not on file       Family History:  Family History   Problem Relation Age of Onset   • Lung Disease Unknown        Medications:    Current Outpatient Medications:   •  montelukast (SINGULAIR) 10 MG Tab, Take 10 mg by mouth every day., Disp: , Rfl:   •  Insulin Degludec (TRESIBA) 100 UNIT/ML Solution, Inject 40 Units as instructed every day., Disp: , Rfl:   •  Insulin Aspart, w/Niacinamide, (FIASP) 100 UNIT/ML Solution, Inject 15 Units as instructed 3 times a day before meals., Disp: , Rfl:   •  Multiple Vitamins-Minerals (MULTIVITAMIN ADULT PO), Take  by mouth., Disp: , Rfl:   •  Cyanocobalamin (VITAMIN B 12 PO), Take 1,000 mcg by mouth., Disp: , Rfl:   •  vitamin D (CHOLECALCIFEROL) 1000 UNIT Tab, Take 1,000 Units by mouth every day., Disp: , Rfl:   •  captopril (CAPOTEN) 12.5 MG Tab, , Disp: , Rfl:   •  levothyroxine (SYNTHROID) 75 MCG Tab, , Disp: , Rfl:   •  omeprazole (PRILOSEC) 40 MG delayed-release capsule, , Disp: , Rfl:   •  ipratropium-albuterol (DUONEB) 0.5-2.5 (3) MG/3ML nebulizer solution, 3 mL by Nebulization route every 6 hours as needed for Shortness of Breath., Disp: 25 Bullet, Rfl: 0  •  albuterol 108 (90 BASE) MCG/ACT Aero Soln inhalation aerosol, Inhale 2 Puffs by mouth every 6 hours as needed for Shortness of Breath., Disp: , Rfl:   •  cetirizine (ZYRTEC) 10 MG Tab, Take 10 mg by mouth 2 times a day., Disp: , Rfl:   •  Turmeric Curcumin 500 MG Cap, Take 500 mg by mouth 2 Times a Day., Disp: , Rfl:   •  B Complex Cap, Take 1 Cap by mouth every day., Disp: , Rfl:   •  benzonatate (TESSALON PERLES) 100 MG Cap, Take 1 Cap by mouth 3 times a day as needed for Cough., Disp: 30 Cap, Rfl: 0  •  celecoxib (CELEBREX) 200 MG CAPS, Take 200 mg by mouth 2 times a day., Disp: , Rfl:   •  albuterol (PROVENTIL) 2.5mg/3ml NEBU, 2.5 mg by Nebulization route every four hours  "as needed for Shortness of Breath. Dose unknown, Disp: , Rfl:   •  fluoxetine (PROZAC) 20 MG CAPS, Take 20 mg by mouth every day., Disp: , Rfl:   •  raloxifene (EVISTA) 60 MG TABS, Take 60 mg by mouth every day., Disp: , Rfl:   •  tiotropium (SPIRIVA HANDIHALER) 18 MCG CAPS, Inhale 18 mcg by mouth every day., Disp: , Rfl:   •  aspirin EC (ECOTRIN) 81 MG TBEC, Take 81 mg by mouth every day., Disp: , Rfl:   •  lovastatin (MEVACOR) 20 MG TABS, Take 20 mg by mouth every evening., Disp: , Rfl:   •  beclomethasone (QVAR) 80 MCG/ACT inhaler, Inhale 2 Puffs by mouth 2 times a day. Indications: Chronic Bronchitis with Asthma, Disp: , Rfl:     Labs: Reviewed    Physical Examination:  Vital signs: /70   Pulse 79   Ht 1.727 m (5' 8\")   Wt 94.3 kg (208 lb)   SpO2 94%   BMI 31.63 kg/m²  Body mass index is 31.63 kg/m². Patient's body mass index is 31.63 kg/m². Exercise and nutrition counseling were performed at this visit.  General: No apparent distress, cooperative  Eyes: No scleral icterus or discharge  ENMT: Normal on external inspection of nose, lips, normal thyroid exam  Neck: No abnormal masses on inspection  Resp: Normal effort, clear to auscultation bilaterally   CVS: Regular rate and rhythm, S1 S2 normal, no murmur   Extremities: No edema  Abdomen: abdominal obesity present  Neuro: Alert and oriented  Skin: No rash  Psych: Normal mood and affect, intact memory and able to make informed decisions    Foot Exam:  Monofilament: done  Monofilament testing with a 10 gram force: sensation intact: decrease along the outer edge of her toes.  Visual Inspection: Feet without maceration, ulcers, fissures.  Pedal pulses: intact bilaterally    Assessment and Plan:    1. Uncontrolled type 1 diabetes mellitus with hyperglycemia (HCC)  Considering several limitations including cost of medications; we will continue current regimen; she is working on better carb counting and targeting more lower post prandial readings.     The " following was reviewd  - Discussed diabetic diet discussed in detail-plate method.  - She will test before meals and log .  - She will walk for 20-30 minutes daily.  - Reviewed medications and advised how to take   - Discussed importance of immunizations and yearly eye exams. Less than a year ago at [facility] [place] that is now [facility].  - Advised daily foot exams. Educated on signs of infection.   - Educated on need to stay well hydrated with water.  - Educated to call with any questions or problems.    2. Acquired hypothyroidism  Continue current dose of levothyroxine.    Return in about 4 months (around 3/18/2020).      Thank you for allowing me to participate in the care of this patient.    Dr. Ger Moreno  This note was scribed by Yusra Rios RN, CDE  11/18/19    CC:   Blaire Perez M.D.    This note was created using voice recognition software (Dragon). The accuracy of the dictation is limited by the abilities of the software. I have reviewed the note prior to signing, however some errors in grammar and context are still possible. If you have any questions related to this note please do not hesitate to contact our office.

## 2019-11-18 ENCOUNTER — OFFICE VISIT (OUTPATIENT)
Dept: ENDOCRINOLOGY | Facility: MEDICAL CENTER | Age: 69
End: 2019-11-18
Payer: MEDICARE

## 2019-11-18 VITALS
OXYGEN SATURATION: 94 % | DIASTOLIC BLOOD PRESSURE: 70 MMHG | SYSTOLIC BLOOD PRESSURE: 118 MMHG | WEIGHT: 208 LBS | HEIGHT: 68 IN | HEART RATE: 79 BPM | BODY MASS INDEX: 31.52 KG/M2

## 2019-11-18 DIAGNOSIS — E03.9 ACQUIRED HYPOTHYROIDISM: ICD-10-CM

## 2019-11-18 DIAGNOSIS — E10.65 UNCONTROLLED TYPE 1 DIABETES MELLITUS WITH HYPERGLYCEMIA (HCC): ICD-10-CM

## 2019-11-18 LAB
HBA1C MFR BLD: 8.4 % (ref 0–5.6)
INT CON NEG: ABNORMAL
INT CON POS: ABNORMAL

## 2019-11-18 PROCEDURE — 83036 HEMOGLOBIN GLYCOSYLATED A1C: CPT | Performed by: INTERNAL MEDICINE

## 2019-11-18 PROCEDURE — 99214 OFFICE O/P EST MOD 30 MIN: CPT | Performed by: INTERNAL MEDICINE

## 2019-11-18 RX ORDER — INSULIN DEGLUDEC 100 U/ML
40 INJECTION, SOLUTION SUBCUTANEOUS DAILY
COMMUNITY
End: 2020-01-29 | Stop reason: SDUPTHER

## 2019-11-18 RX ORDER — MONTELUKAST SODIUM 10 MG/1
10 TABLET ORAL DAILY
COMMUNITY

## 2020-01-29 RX ORDER — INSULIN DEGLUDEC 100 U/ML
45 INJECTION, SOLUTION SUBCUTANEOUS DAILY
Qty: 15 ML | Refills: 6 | Status: SHIPPED | OUTPATIENT
Start: 2020-01-29 | End: 2021-06-23

## 2020-04-18 NOTE — PROGRESS NOTES
Endocrinology Clinic Progress Note  PCP: Blaire Perez M.D.  This encounter was conducted via Zoom .   Verbal consent was obtained. Patient's identity was verified.  HPI:  Michelle Espinoza is a 69 y.o. old patient who comes in today for routine follow up of Management of Uncontrolled Type 1 Diabetes and hypothyroidism.  She can not afford the insulin pump and needs help with samples for her insulin.    Hypothyroidism:  Currently taking Levothyroxine 75 mcg daily.  Results for MICHELLE ESPINOZA (MRN [id number]) as of 4/18/2020 12:02   Ref. Range 6/4/2019 09:07   TSH Latest Ref Range: 0.380 - 5.330 uIU/mL 1.060   Free T-4 Latest Ref Range: 0.53 - 1.43 ng/dL 0.92   T3,Free Latest Ref Range: 2.40 - 4.20 pg/mL 2.79         HPI:  Michelle Espinoza is a 69 y.o. old patient who comes in today for evaluation of above stated problem.    Most Recent HbA1c:   Lab Results   Component Value Date/Time    HBA1C 8.4 (A) 11/18/2019 01:14 PM        Current Diabetes Regimen:  Basal Insulin: Tresiba 42 units daily  Prandial Insulin: FIASP 1:6 gram carbohydrates plus a 2:50>150 correction before meals. 10 unit before meals.    Testing at least 4 times daily.   30 day average is 146.  Hypoglycemia:  Only 1 low blood.    ROS:  Constitutional: No weight loss  Cardiac: No palpitations or racing heart  Resp: No shortness of breath  Neuro: No numbness or tinging in feet  Endo: No heat or cold intolerance, no polyuria or polydipsia  All other systems were reviewed and were negative.    Past Medical History:  Patient Active Problem List    Diagnosis Date Noted   • Influenza A 12/27/2016   • COPD (chronic obstructive pulmonary disease) ([facility]) 12/27/2016   • Osteoporosis 12/27/2016   • Pneumonia 12/26/2016   • Acute on chronic respiratory failure with hypoxia ([facility]) 12/26/2016   • Tobacco abuse 10/20/2016   • Insulin pump status 11/17/2014   • Type 1 diabetes mellitus with neurological manifestations, uncontrolled ([facility]) 08/22/2014   • Diabetic polyneuropathy  ([facility]) 08/22/2014   • Disorder of bone and cartilage 04/23/2014   • Fitting and adjustment of insulin pump 04/23/2014   • Encounter for long-term (current) use of insulin ([facility]) 04/23/2014   • Low back pain 02/06/2014   • Hypothyroid 09/10/2013   • Obesity 09/10/2013   • Postoperative anemia due to acute blood loss 09/10/2013   • Hip pain 09/09/2013   • Hypertension 08/22/2013   • Dyslipidemia 08/22/2013   • Vitamin d deficiency 08/22/2013     Class: Acute   • Uncontrolled type 1 diabetes mellitus ([facility]) 08/22/2013       Past Surgical History:  Past Surgical History:   Procedure Laterality Date   • HIP ARTHROPLASTY TOTAL  9/9/2013    Performed by Pedro Warren M.D. at [facility]   • BREAST RECONSTRUCTION  2001   • MASTECTOMY  1992    right, full   • MASTECTOMY  1987, 2001    partial, left x2       Allergies:  Bactrim [sulfamethoxazole w-trimethoprim] and Percocet [oxycodone-acetaminophen]    Social History:  Social History     Socioeconomic History   • Marital status:      Spouse name: Not on file   • Number of children: Not on file   • Years of education: Not on file   • Highest education level: Not on file   Occupational History   • Not on file   Social Needs   • Financial resource strain: Not on file   • Food insecurity     Worry: Not on file     Inability: Not on file   • Transportation needs     Medical: Not on file     Non-medical: Not on file   Tobacco Use   • Smoking status: Current Every Day Smoker     Packs/day: 1.00     Years: 40.00     Pack years: 40.00     Types: Cigarettes   • Smokeless tobacco: Never Used   Substance and Sexual Activity   • Alcohol use: Yes     Comment: 2 per day   • Drug use: No   • Sexual activity: Not on file   Lifestyle   • Physical activity     Days per week: Not on file     Minutes per session: Not on file   • Stress: Not on file   Relationships   • Social connections     Talks on phone: Not on file     Gets together: Not on file     Attends [employer]  service: Not on file     Active member of club or organization: Not on file     Attends meetings of clubs or organizations: Not on file     Relationship status: Not on file   • Intimate partner violence     Fear of current or ex partner: Not on file     Emotionally abused: Not on file     Physically abused: Not on file     Forced sexual activity: Not on file   Other Topics Concern   • Not on file   Social History Narrative   • Not on file       Family History:  Family History   Problem Relation Age of Onset   • Lung Disease Unknown        Medications:    Current Outpatient Medications:   •  Insulin Degludec (TRESIBA) 100 UNIT/ML Solution, Inject 45 Units as instructed every day., Disp: 15 mL, Rfl: 6  •  montelukast (SINGULAIR) 10 MG Tab, Take 10 mg by mouth every day., Disp: , Rfl:   •  Insulin Aspart, w/Niacinamide, (FIASP) 100 UNIT/ML Solution, Inject 15 Units as instructed 3 times a day before meals., Disp: , Rfl:   •  Multiple Vitamins-Minerals (MULTIVITAMIN ADULT PO), Take  by mouth., Disp: , Rfl:   •  Cyanocobalamin (VITAMIN B 12 PO), Take 1,000 mcg by mouth., Disp: , Rfl:   •  vitamin D (CHOLECALCIFEROL) 1000 UNIT Tab, Take 1,000 Units by mouth every day., Disp: , Rfl:   •  captopril (CAPOTEN) 12.5 MG Tab, , Disp: , Rfl:   •  levothyroxine (SYNTHROID) 75 MCG Tab, , Disp: , Rfl:   •  omeprazole (PRILOSEC) 40 MG delayed-release capsule, , Disp: , Rfl:   •  ipratropium-albuterol (DUONEB) 0.5-2.5 (3) MG/3ML nebulizer solution, 3 mL by Nebulization route every 6 hours as needed for Shortness of Breath., Disp: 25 Bullet, Rfl: 0  •  albuterol 108 (90 BASE) MCG/ACT Aero Soln inhalation aerosol, Inhale 2 Puffs by mouth every 6 hours as needed for Shortness of Breath., Disp: , Rfl:   •  cetirizine (ZYRTEC) 10 MG Tab, Take 10 mg by mouth 2 times a day., Disp: , Rfl:   •  Turmeric Curcumin 500 MG Cap, Take 500 mg by mouth 2 Times a Day., Disp: , Rfl:   •  B Complex Cap, Take 1 Cap by mouth every day., Disp: , Rfl:   •   celecoxib (CELEBREX) 200 MG CAPS, Take 200 mg by mouth every day., Disp: , Rfl:   •  albuterol (PROVENTIL) 2.5mg/3ml NEBU, 2.5 mg by Nebulization route every four hours as needed for Shortness of Breath. Dose unknown, Disp: , Rfl:   •  fluoxetine (PROZAC) 20 MG CAPS, Take 20 mg by mouth every day., Disp: , Rfl:   •  raloxifene (EVISTA) 60 MG TABS, Take 60 mg by mouth every day., Disp: , Rfl:   •  tiotropium (SPIRIVA HANDIHALER) 18 MCG CAPS, Inhale 18 mcg by mouth every day., Disp: , Rfl:   •  aspirin EC (ECOTRIN) 81 MG TBEC, Take 81 mg by mouth every day., Disp: , Rfl:   •  lovastatin (MEVACOR) 20 MG TABS, Take 20 mg by mouth every evening., Disp: , Rfl:   •  beclomethasone (QVAR) 80 MCG/ACT inhaler, Inhale 2 Puffs by mouth 2 times a day. Indications: Chronic Bronchitis with Asthma, Disp: , Rfl:     Labs: Reviewed    Physical Examination:  Vital signs: /76   Pulse 74   Wt 97.1 kg (214 lb)   SpO2 92%   BMI 32.54 kg/m²  Body mass index is 32.54 kg/m². Patient's body mass index is 32.54 kg/m². General: No apparent distress, cooperative  Eyes: No scleral icterus or discharge  ENMT: Normal on external inspection of nose, lips, normal thyroid on inspection  Neck: No abnormal masses on inspection  Resp: Normal effort  Extremities: No visible edema  Neuro: Alert and oriented  Skin: No visible rash  Psych: Normal mood and affect, intact memory and able to make informed decisions    Assessment and Plan:    1. Uncontrolled type 1 diabetes mellitus with hyperglycemia (HCC)  Continue current regimen; her A1c based on recent average sugars is likely to be 7% and below.    - Discussed diabetic diet discussed in detail-plate method.  - She will test before meals and log.  Not exercising as much now that she is staying in due to COVID 19.    - She will walk for 20-30 minutes daily.  - Discussed importance of immunizations and yearly eye exams. She is seen at [facility] in [place] yearly.  - Advised daily foot exams.  Educated on signs of infection.   - Educated on need to stay well hydrated with water.  - Educated to call with any questions or problems.    2. Acquired hypothyroidism  Continue current dose of levothyroxine.     Return in about 3 months (around 7/20/2020).    Thank you for allowing me to participate in the care of this patient.    Dr. Ger Moreno      CC:   Blaire Perez M.D.    This note was created using voice recognition software (Dragon). The accuracy of the dictation is limited by the abilities of the software. I have reviewed the note prior to signing, however some errors in grammar and context are still possible. If you have any questions related to this note please do not hesitate to contact our office.

## 2020-04-20 ENCOUNTER — TELEMEDICINE (OUTPATIENT)
Dept: ENDOCRINOLOGY | Facility: MEDICAL CENTER | Age: 70
End: 2020-04-20
Payer: MEDICARE

## 2020-04-20 VITALS
BODY MASS INDEX: 32.54 KG/M2 | OXYGEN SATURATION: 92 % | HEART RATE: 74 BPM | SYSTOLIC BLOOD PRESSURE: 130 MMHG | WEIGHT: 214 LBS | DIASTOLIC BLOOD PRESSURE: 76 MMHG

## 2020-04-20 DIAGNOSIS — E78.5 DYSLIPIDEMIA: ICD-10-CM

## 2020-04-20 DIAGNOSIS — E53.8 VITAMIN B 12 DEFICIENCY: ICD-10-CM

## 2020-04-20 DIAGNOSIS — E10.65 UNCONTROLLED TYPE 1 DIABETES MELLITUS WITH HYPERGLYCEMIA (HCC): ICD-10-CM

## 2020-04-20 DIAGNOSIS — E03.9 ACQUIRED HYPOTHYROIDISM: ICD-10-CM

## 2020-04-20 DIAGNOSIS — E55.9 VITAMIN D DEFICIENCY: ICD-10-CM

## 2020-04-20 PROCEDURE — 99214 OFFICE O/P EST MOD 30 MIN: CPT | Mod: 95,CR | Performed by: INTERNAL MEDICINE

## 2020-04-20 ASSESSMENT — FIBROSIS 4 INDEX: FIB4 SCORE: 1.25

## 2020-07-22 ENCOUNTER — HOSPITAL ENCOUNTER (OUTPATIENT)
Dept: LAB | Facility: MEDICAL CENTER | Age: 70
End: 2020-07-22
Attending: INTERNAL MEDICINE
Payer: MEDICARE

## 2020-07-22 DIAGNOSIS — E03.9 ACQUIRED HYPOTHYROIDISM: ICD-10-CM

## 2020-07-22 DIAGNOSIS — E55.9 VITAMIN D DEFICIENCY: ICD-10-CM

## 2020-07-22 DIAGNOSIS — E78.5 DYSLIPIDEMIA: ICD-10-CM

## 2020-07-22 DIAGNOSIS — E10.65 UNCONTROLLED TYPE 1 DIABETES MELLITUS WITH HYPERGLYCEMIA (HCC): ICD-10-CM

## 2020-07-22 DIAGNOSIS — E53.8 VITAMIN B 12 DEFICIENCY: ICD-10-CM

## 2020-07-22 LAB
25(OH)D3 SERPL-MCNC: 41 NG/ML (ref 30–100)
CHOLEST SERPL-MCNC: 184 MG/DL (ref 100–199)
CREAT UR-MCNC: 58.73 MG/DL
EST. AVERAGE GLUCOSE BLD GHB EST-MCNC: 151 MG/DL
FASTING STATUS PATIENT QL REPORTED: NORMAL
HBA1C MFR BLD: 6.9 % (ref 0–5.6)
HDLC SERPL-MCNC: 73 MG/DL
LDLC SERPL CALC-MCNC: 92 MG/DL
MICROALBUMIN UR-MCNC: <1.2 MG/DL
MICROALBUMIN/CREAT UR: NORMAL MG/G (ref 0–30)
T3 SERPL-MCNC: 81.9 NG/DL (ref 60–181)
T3FREE SERPL-MCNC: 2.32 PG/ML (ref 2–4.4)
T4 FREE SERPL-MCNC: 1.01 NG/DL (ref 0.93–1.7)
TRIGL SERPL-MCNC: 96 MG/DL (ref 0–149)
TSH SERPL DL<=0.005 MIU/L-ACNC: 2.72 UIU/ML (ref 0.38–5.33)
VIT B12 SERPL-MCNC: 1085 PG/ML (ref 211–911)

## 2020-07-22 PROCEDURE — 84480 ASSAY TRIIODOTHYRONINE (T3): CPT

## 2020-07-22 PROCEDURE — 36415 COLL VENOUS BLD VENIPUNCTURE: CPT

## 2020-07-22 PROCEDURE — 80061 LIPID PANEL: CPT

## 2020-07-22 PROCEDURE — 83036 HEMOGLOBIN GLYCOSYLATED A1C: CPT

## 2020-07-22 PROCEDURE — 82570 ASSAY OF URINE CREATININE: CPT

## 2020-07-22 PROCEDURE — 82306 VITAMIN D 25 HYDROXY: CPT

## 2020-07-22 PROCEDURE — 84439 ASSAY OF FREE THYROXINE: CPT

## 2020-07-22 PROCEDURE — 82607 VITAMIN B-12: CPT

## 2020-07-22 PROCEDURE — 84443 ASSAY THYROID STIM HORMONE: CPT

## 2020-07-22 PROCEDURE — 84481 FREE ASSAY (FT-3): CPT

## 2020-07-22 PROCEDURE — 82043 UR ALBUMIN QUANTITATIVE: CPT

## 2020-07-27 ENCOUNTER — OFFICE VISIT (OUTPATIENT)
Dept: ENDOCRINOLOGY | Facility: MEDICAL CENTER | Age: 70
End: 2020-07-27
Attending: INTERNAL MEDICINE
Payer: MEDICARE

## 2020-07-27 VITALS
OXYGEN SATURATION: 90 % | WEIGHT: 223 LBS | BODY MASS INDEX: 39.51 KG/M2 | HEART RATE: 89 BPM | SYSTOLIC BLOOD PRESSURE: 108 MMHG | HEIGHT: 63 IN | DIASTOLIC BLOOD PRESSURE: 66 MMHG

## 2020-07-27 DIAGNOSIS — E78.5 DYSLIPIDEMIA: Chronic | ICD-10-CM

## 2020-07-27 DIAGNOSIS — E10.65 UNCONTROLLED TYPE 1 DIABETES MELLITUS WITH HYPERGLYCEMIA (HCC): ICD-10-CM

## 2020-07-27 DIAGNOSIS — E03.9 ACQUIRED HYPOTHYROIDISM: Chronic | ICD-10-CM

## 2020-07-27 DIAGNOSIS — E55.9 VITAMIN D DEFICIENCY: ICD-10-CM

## 2020-07-27 PROCEDURE — 99214 OFFICE O/P EST MOD 30 MIN: CPT | Performed by: INTERNAL MEDICINE

## 2020-07-27 PROCEDURE — 99211 OFF/OP EST MAY X REQ PHY/QHP: CPT | Performed by: INTERNAL MEDICINE

## 2020-07-27 RX ORDER — LEVOTHYROXINE SODIUM 0.1 MG/1
100 TABLET ORAL
Qty: 90 TAB | Refills: 3 | Status: SHIPPED | OUTPATIENT
Start: 2020-07-27 | End: 2021-02-24

## 2020-07-27 ASSESSMENT — FIBROSIS 4 INDEX: FIB4 SCORE: 1.25

## 2020-07-27 NOTE — PROGRESS NOTES
"Endocrinology Clinic Progress Note  PCP: Blaire Perez M.D.    HPI:  Michelle Espinoza is a 69 y.o. old patient who is seen today for review of her endocrine problems.    1. Type 1 diabetes, uncontrolled.   Current Diabetes Regimen:  Tresiba 42 units per day  Fiasp taking between 5-10 units with meals    Most Recent HbA1c:   Lab Results   Component Value Date/Time    HBA1C 6.9 (H) 07/22/2020 06:56 AM      Patient has been testing blood sugars 3 times per day.   Fasting blood sugars usually running in the   Ac lunch blood sugars 100-150  Ac dinner 100-150  Hypoglycemia:  Occasional    Exercise: sporadic irregular exercise, <half hour walking weekly  Diet: \"healthy\" diet  in general  Last Retinal Exam: past due   Daily Foot Exam: yes   2. Hypothyroid: currently on Levothyroxine 75 mcg per day     Ref. Range 7/22/2020 06:56   TSH Latest Ref Range: 0.380 - 5.330 uIU/mL 2.720   Free T-4 Latest Ref Range: 0.93 - 1.70 ng/dL 1.01   T3 Latest Ref Range: 60.0 - 181.0 ng/dL 81.9   T3,Free Latest Ref Range: 2.00 - 4.40 pg/mL 2.32     3 Vitamin D deficiency: currently on Vitamin D supplementation of 1000 iu per day     Ref. Range 7/22/2020 06:56   25-Hydroxy   Vitamin D 25 Latest Ref Range: 30 - 100 ng/mL 41     4. Dyslipidemia: currently on Lovastatin 20 mg per dy   Ref. Range 7/22/2020 06:56   Cholesterol,Tot Latest Ref Range: 100 - 199 mg/dL 184   Triglycerides Latest Ref Range: 0 - 149 mg/dL 96   HDL Latest Ref Range: >=40 mg/dL 73   LDL Latest Ref Range: <100 mg/dL 92       ROS:  Constitutional: No weight loss  Cardiac: No palpitations or racing heart  Resp: No shortness of breath  Neuro: No numbness or tinging in feet  Endo: No heat or cold intolerance, no polyuria or polydipsia  All other systems were reviewed and were negative.    Past Medical History:  Patient Active Problem List    Diagnosis Date Noted   • Influenza A 12/27/2016   • COPD (chronic obstructive pulmonary disease) (HCC) 12/27/2016   • Osteoporosis " 12/27/2016   • Pneumonia 12/26/2016   • Acute on chronic respiratory failure with hypoxia (HCC) 12/26/2016   • Tobacco abuse 10/20/2016   • Insulin pump status 11/17/2014   • Type 1 diabetes mellitus with neurological manifestations, uncontrolled ([facility]) 08/22/2014   • Diabetic polyneuropathy ([facility]) 08/22/2014   • Disorder of bone and cartilage 04/23/2014   • Fitting and adjustment of insulin pump 04/23/2014   • Encounter for long-term (current) use of insulin ([facility]) 04/23/2014   • Low back pain 02/06/2014   • Hypothyroid 09/10/2013   • Obesity 09/10/2013   • Postoperative anemia due to acute blood loss 09/10/2013   • Hip pain 09/09/2013   • Hypertension 08/22/2013   • Dyslipidemia 08/22/2013   • Vitamin D deficiency 08/22/2013     Class: Acute   • Uncontrolled type 1 diabetes mellitus (HCC) 08/22/2013       Past Surgical History:  Past Surgical History:   Procedure Laterality Date   • HIP ARTHROPLASTY TOTAL  9/9/2013    Performed by Pedro Warren M.D. at [facility]   • BREAST RECONSTRUCTION  2001   • MASTECTOMY  1992    right, full   • MASTECTOMY  1987, 2001    partial, left x2       Allergies:  Bactrim [sulfamethoxazole w-trimethoprim] and Percocet [oxycodone-acetaminophen]    Social History:  Social History     Socioeconomic History   • Marital status:      Spouse name: Not on file   • Number of children: Not on file   • Years of education: Not on file   • Highest education level: Not on file   Occupational History   • Not on file   Social Needs   • Financial resource strain: Not on file   • Food insecurity     Worry: Not on file     Inability: Not on file   • Transportation needs     Medical: Not on file     Non-medical: Not on file   Tobacco Use   • Smoking status: Current Every Day Smoker     Packs/day: 1.00     Years: 40.00     Pack years: 40.00     Types: Cigarettes   • Smokeless tobacco: Never Used   Substance and Sexual Activity   • Alcohol use: Yes     Comment: 2 per day   • Drug use:  No   • Sexual activity: Not on file   Lifestyle   • Physical activity     Days per week: Not on file     Minutes per session: Not on file   • Stress: Not on file   Relationships   • Social connections     Talks on phone: Not on file     Gets together: Not on file     Attends Worship service: Not on file     Active member of club or organization: Not on file     Attends meetings of clubs or organizations: Not on file     Relationship status: Not on file   • Intimate partner violence     Fear of current or ex partner: Not on file     Emotionally abused: Not on file     Physically abused: Not on file     Forced sexual activity: Not on file   Other Topics Concern   • Not on file   Social History Narrative   • Not on file       Family History:  Family History   Problem Relation Age of Onset   • Lung Disease Unknown        Medications:    Current Outpatient Medications:   •  levothyroxine (SYNTHROID) 100 MCG Tab, Take 1 Tab by mouth Every morning on an empty stomach., Disp: 90 Tab, Rfl: 3  •  Insulin Degludec (TRESIBA) 100 UNIT/ML Solution, Inject 45 Units as instructed every day. (Patient taking differently: Inject 42 Units as instructed every day.), Disp: 15 mL, Rfl: 6  •  montelukast (SINGULAIR) 10 MG Tab, Take 10 mg by mouth every day., Disp: , Rfl:   •  Insulin Aspart, w/Niacinamide, (FIASP) 100 UNIT/ML Solution, Inject 15 Units as instructed 3 times a day before meals., Disp: , Rfl:   •  Multiple Vitamins-Minerals (MULTIVITAMIN ADULT PO), Take  by mouth., Disp: , Rfl:   •  Cyanocobalamin (VITAMIN B 12 PO), Take 1,000 mcg by mouth., Disp: , Rfl:   •  vitamin D (CHOLECALCIFEROL) 1000 UNIT Tab, Take 1,000 Units by mouth every day., Disp: , Rfl:   •  captopril (CAPOTEN) 12.5 MG Tab, , Disp: , Rfl:   •  omeprazole (PRILOSEC) 40 MG delayed-release capsule, , Disp: , Rfl:   •  ipratropium-albuterol (DUONEB) 0.5-2.5 (3) MG/3ML nebulizer solution, 3 mL by Nebulization route every 6 hours as needed for Shortness of Breath.,  "Disp: 25 Bullet, Rfl: 0  •  albuterol 108 (90 BASE) MCG/ACT Aero Soln inhalation aerosol, Inhale 2 Puffs by mouth every 6 hours as needed for Shortness of Breath., Disp: , Rfl:   •  cetirizine (ZYRTEC) 10 MG Tab, Take 10 mg by mouth 2 times a day., Disp: , Rfl:   •  Turmeric Curcumin 500 MG Cap, Take 500 mg by mouth 2 Times a Day., Disp: , Rfl:   •  B Complex Cap, Take 1 Cap by mouth every day., Disp: , Rfl:   •  celecoxib (CELEBREX) 200 MG CAPS, Take 200 mg by mouth every day., Disp: , Rfl:   •  albuterol (PROVENTIL) 2.5mg/3ml NEBU, 2.5 mg by Nebulization route every four hours as needed for Shortness of Breath. Dose unknown, Disp: , Rfl:   •  fluoxetine (PROZAC) 20 MG CAPS, Take 20 mg by mouth every day., Disp: , Rfl:   •  raloxifene (EVISTA) 60 MG TABS, Take 60 mg by mouth every day., Disp: , Rfl:   •  tiotropium (SPIRIVA HANDIHALER) 18 MCG CAPS, Inhale 18 mcg by mouth every day., Disp: , Rfl:   •  aspirin EC (ECOTRIN) 81 MG TBEC, Take 81 mg by mouth every day., Disp: , Rfl:   •  lovastatin (MEVACOR) 20 MG TABS, Take 20 mg by mouth every evening., Disp: , Rfl:   •  beclomethasone (QVAR) 80 MCG/ACT inhaler, Inhale 2 Puffs by mouth 2 times a day. Indications: Chronic Bronchitis with Asthma, Disp: , Rfl:     Labs: Reviewed    Physical Examination:  Vital signs: /66 (BP Location: Left arm, Patient Position: Sitting, BP Cuff Size: Large adult)   Pulse 89   Ht 1.6 m (5' 3\")   Wt 101.2 kg (223 lb)   SpO2 90%   BMI 39.50 kg/m²  Body mass index is 39.5 kg/m².  General: No apparent distress, cooperative  Eyes: No scleral icterus or discharge  ENMT: Normal on external inspection of nose, lips, normal thyroid exam  Neck: No abnormal masses on inspection  Resp: Normal effort, clear to auscultation bilaterally   CVS: Regular rate and rhythm, S1 S2 normal, no murmur   Extremities: No edema  Abdomen: abdominal obesity present  Neuro: Alert and oriented  Skin: No rash  Psych: Normal mood and affect, intact memory and " able to make informed decisions    Assessment and Plan:  1. Uncontrolled type 1 diabetes mellitus with hyperglycemia (HCC)  Continue current regimen    2. Acquired hypothyroidism  Increase levothyroxine to 100 mcg daily.   Does feel fatigued and tired easily ; clinically and bio chemically hypothyroid.     3. Vitamin D deficiency  Continue vit D with food.     4. Dyslipidemia  Continue lovastatin.     Return in about 3 months (around 10/27/2020).    Thank you for allowing me to participate in the care of this patient.    Ger Moreno M.D.  07/27/20    CC:   Blaire Perez M.D.    This note was created using voice recognition software (Dragon). The accuracy of the dictation is limited by the abilities of the software. I have reviewed the note prior to signing, however some errors in grammar and context are still possible. If you have any questions related to this note please do not hesitate to contact our office.

## 2020-10-20 ENCOUNTER — HOSPITAL ENCOUNTER (OUTPATIENT)
Dept: LAB | Facility: MEDICAL CENTER | Age: 70
End: 2020-10-20
Attending: INTERNAL MEDICINE
Payer: MEDICARE

## 2020-10-20 DIAGNOSIS — E55.9 VITAMIN D DEFICIENCY: ICD-10-CM

## 2020-10-20 DIAGNOSIS — E03.9 ACQUIRED HYPOTHYROIDISM: Chronic | ICD-10-CM

## 2020-10-20 LAB
25(OH)D3 SERPL-MCNC: 54 NG/ML (ref 30–100)
T3 SERPL-MCNC: 89.2 NG/DL (ref 60–181)
T3FREE SERPL-MCNC: 2.42 PG/ML (ref 2–4.4)
T4 FREE SERPL-MCNC: 1.22 NG/DL (ref 0.93–1.7)
TSH SERPL DL<=0.005 MIU/L-ACNC: 1.64 UIU/ML (ref 0.38–5.33)

## 2020-10-20 PROCEDURE — 84443 ASSAY THYROID STIM HORMONE: CPT

## 2020-10-20 PROCEDURE — 36415 COLL VENOUS BLD VENIPUNCTURE: CPT

## 2020-10-20 PROCEDURE — 84439 ASSAY OF FREE THYROXINE: CPT

## 2020-10-20 PROCEDURE — 84480 ASSAY TRIIODOTHYRONINE (T3): CPT

## 2020-10-20 PROCEDURE — 82306 VITAMIN D 25 HYDROXY: CPT

## 2020-10-20 PROCEDURE — 84481 FREE ASSAY (FT-3): CPT

## 2020-10-28 ENCOUNTER — NON-PROVIDER VISIT (OUTPATIENT)
Dept: ENDOCRINOLOGY | Facility: MEDICAL CENTER | Age: 70
End: 2020-10-28
Attending: INTERNAL MEDICINE
Payer: MEDICARE

## 2020-10-28 VITALS
BODY MASS INDEX: 34.1 KG/M2 | HEIGHT: 68 IN | SYSTOLIC BLOOD PRESSURE: 118 MMHG | WEIGHT: 225 LBS | TEMPERATURE: 97.3 F | HEART RATE: 93 BPM | OXYGEN SATURATION: 94 % | DIASTOLIC BLOOD PRESSURE: 62 MMHG

## 2020-10-28 DIAGNOSIS — E10.65 UNCONTROLLED TYPE 1 DIABETES MELLITUS WITH HYPERGLYCEMIA (HCC): ICD-10-CM

## 2020-10-28 DIAGNOSIS — E03.9 ACQUIRED HYPOTHYROIDISM: ICD-10-CM

## 2020-10-28 DIAGNOSIS — E55.9 VITAMIN D DEFICIENCY: ICD-10-CM

## 2020-10-28 LAB
HBA1C MFR BLD: 6.1 % (ref 0–5.6)
INT CON NEG: ABNORMAL
INT CON POS: ABNORMAL

## 2020-10-28 PROCEDURE — 83036 HEMOGLOBIN GLYCOSYLATED A1C: CPT

## 2020-10-28 PROCEDURE — 99213 OFFICE O/P EST LOW 20 MIN: CPT | Performed by: INTERNAL MEDICINE

## 2020-10-28 RX ORDER — PANTOPRAZOLE SODIUM 40 MG/1
TABLET, DELAYED RELEASE ORAL
COMMUNITY
Start: 2020-10-19 | End: 2021-08-31

## 2020-10-28 ASSESSMENT — FIBROSIS 4 INDEX: FIB4 SCORE: 1.265174559761089516

## 2020-10-28 NOTE — PROGRESS NOTES
"RN-CDE Note    Subjective:   HPI  Michelle is a 70 year old female with uncontrolled type 2 diabetes.   Health changes since last visit/interval Hx: states she had a couple of colon polyps removed and hernia repair.      Diabetes Medications:   tresiba 42 units daily  fiasp taking 10-15 units with meals.   Taking above medications as prescribed: yes  Taking daily ASA: Yes        Exercise: unable to exercise due to breathin  Diet: \"healthy\" diet in general  Patient's body mass index is 34.21 kg/m². Exercise and nutrition counseling were performed at this visit.      Health Maintenance:   Health Maintenance Due   Topic Date Due   • Annual Wellness Visit  1950   • Annual Pulmonary Function Test / Spirometry  09/25/1956   • IMM HEP B VACCINE (1 of 3 - Risk 3-dose series) 09/25/1969   • PAP SMEAR  09/25/1971   • MAMMOGRAM  09/25/1990   • COLONOSCOPY  09/25/2000   • RETINAL SCREENING  09/17/2016   • SERUM CREATININE  06/04/2020   • IMM ZOSTER VACCINES (3 of 3) 11/07/2020         DM:   Last A1c:   Lab Results   Component Value Date/Time    HBA1C 6.1 (A) 10/28/2020 10:55 AM      A1C GOAL: < 7    Glucose monitoring frequency: testing her blood sugars 3-4 times per day    Hypoglycemic episodes: yes - on occasion.  Usually in the middle of the night, thinks she may be overestimating carbs at dinner.     Last Retinal Exam: has appointment next month  Daily Foot Exam: Yes       Lab Results   Component Value Date/Time    MALBCRT see below 07/22/2020 06:56 AM    MICROALBUR <1.2 07/22/2020 06:56 AM        ACR Albumin/Creatinine Ratio goal <30     HTN:   Blood pressure goal <140/<80 at goal.   Currently Rx ACE/ARB: Yes    Dyslipidemia:    Lab Results   Component Value Date/Time    CHOLSTRLTOT 184 07/22/2020 06:56 AM    LDL 92 07/22/2020 06:56 AM    HDL 73 07/22/2020 06:56 AM    TRIGLYCERIDE 96 07/22/2020 06:56 AM       Lab Results   Component Value Date/Time    SODIUM 136 06/04/2019 09:07 AM    POTASSIUM 3.9 06/04/2019 09:07 AM "    CHLORIDE 101 06/04/2019 09:07 AM    CO2 27 06/04/2019 09:07 AM    GLUCOSE 130 (H) 06/04/2019 09:07 AM    BUN 12 06/04/2019 09:07 AM    CREATININE 0.69 06/04/2019 09:07 AM     Lab Results   Component Value Date/Time    ALKPHOSPHAT 90 06/04/2019 09:07 AM    ASTSGOT 14 06/04/2019 09:07 AM    ALTSGPT 15 06/04/2019 09:07 AM    TBILIRUBIN 0.6 06/04/2019 09:07 AM        Currently Rx Statin: Yes    She  reports that she has been smoking cigarettes. She has a 40.00 pack-year smoking history. She has never used smokeless tobacco.    Objective:     Exam:  Monofilament: not done    Plan:     Discussed and educated on:   - All medications, side effects and compliance (discussed carefully)  - Annual eye examinations at Ophthalmology  - Foot Care: what to look for when checking feet every day  - HbA1C: target  - Home glucose monitoring emphasized  - Weight control and daily exercise    Recommended medication changes: no changes

## 2021-01-15 DIAGNOSIS — Z23 NEED FOR VACCINATION: ICD-10-CM

## 2021-02-09 ENCOUNTER — PATIENT OUTREACH (OUTPATIENT)
Dept: ENDOCRINOLOGY | Facility: MEDICAL CENTER | Age: 71
End: 2021-02-09

## 2021-02-12 NOTE — PROGRESS NOTES
Tresiba and Fiasp we can do the [employer] application.  I spoke to the patient and she is coming into the office 2/23 and I am going to have her sign the applications.    I am also going to provide her with the application for Qvar and Spiriva.

## 2021-02-17 ENCOUNTER — IMMUNIZATION (OUTPATIENT)
Dept: FAMILY PLANNING/WOMEN'S HEALTH CLINIC | Facility: IMMUNIZATION CENTER | Age: 71
End: 2021-02-17
Attending: INTERNAL MEDICINE
Payer: MEDICARE

## 2021-02-17 ENCOUNTER — HOSPITAL ENCOUNTER (OUTPATIENT)
Dept: LAB | Facility: MEDICAL CENTER | Age: 71
End: 2021-02-17
Attending: INTERNAL MEDICINE
Payer: MEDICARE

## 2021-02-17 DIAGNOSIS — E55.9 VITAMIN D DEFICIENCY: ICD-10-CM

## 2021-02-17 DIAGNOSIS — E10.65 UNCONTROLLED TYPE 1 DIABETES MELLITUS WITH HYPERGLYCEMIA (HCC): ICD-10-CM

## 2021-02-17 DIAGNOSIS — Z23 NEED FOR VACCINATION: ICD-10-CM

## 2021-02-17 DIAGNOSIS — Z23 ENCOUNTER FOR VACCINATION: Primary | ICD-10-CM

## 2021-02-17 LAB
25(OH)D3 SERPL-MCNC: 71 NG/ML (ref 30–100)
ALBUMIN SERPL BCP-MCNC: 3.8 G/DL (ref 3.2–4.9)
ALBUMIN/GLOB SERPL: 1.4 G/DL
ALP SERPL-CCNC: 84 U/L (ref 30–99)
ALT SERPL-CCNC: 18 U/L (ref 2–50)
ANION GAP SERPL CALC-SCNC: 10 MMOL/L (ref 7–16)
AST SERPL-CCNC: 23 U/L (ref 12–45)
BILIRUB SERPL-MCNC: 0.5 MG/DL (ref 0.1–1.5)
BUN SERPL-MCNC: 10 MG/DL (ref 8–22)
CALCIUM SERPL-MCNC: 9.3 MG/DL (ref 8.5–10.5)
CHLORIDE SERPL-SCNC: 103 MMOL/L (ref 96–112)
CO2 SERPL-SCNC: 28 MMOL/L (ref 20–33)
CREAT SERPL-MCNC: 0.66 MG/DL (ref 0.5–1.4)
FASTING STATUS PATIENT QL REPORTED: NORMAL
GLOBULIN SER CALC-MCNC: 2.7 G/DL (ref 1.9–3.5)
GLUCOSE SERPL-MCNC: 78 MG/DL (ref 65–99)
POTASSIUM SERPL-SCNC: 4.6 MMOL/L (ref 3.6–5.5)
PROT SERPL-MCNC: 6.5 G/DL (ref 6–8.2)
SODIUM SERPL-SCNC: 141 MMOL/L (ref 135–145)
T3FREE SERPL-MCNC: 2.59 PG/ML (ref 2–4.4)
T4 FREE SERPL-MCNC: 1.17 NG/DL (ref 0.93–1.7)
TSH SERPL DL<=0.005 MIU/L-ACNC: 2.27 UIU/ML (ref 0.38–5.33)

## 2021-02-17 PROCEDURE — 36415 COLL VENOUS BLD VENIPUNCTURE: CPT

## 2021-02-17 PROCEDURE — 0001A PFIZER SARS-COV-2 VACCINE: CPT | Performed by: INTERNAL MEDICINE

## 2021-02-17 PROCEDURE — 84443 ASSAY THYROID STIM HORMONE: CPT

## 2021-02-17 PROCEDURE — 80053 COMPREHEN METABOLIC PANEL: CPT

## 2021-02-17 PROCEDURE — 84439 ASSAY OF FREE THYROXINE: CPT

## 2021-02-17 PROCEDURE — 82306 VITAMIN D 25 HYDROXY: CPT

## 2021-02-17 PROCEDURE — 84481 FREE ASSAY (FT-3): CPT

## 2021-02-17 PROCEDURE — 91300 PFIZER SARS-COV-2 VACCINE: CPT | Performed by: INTERNAL MEDICINE

## 2021-02-23 ENCOUNTER — OFFICE VISIT (OUTPATIENT)
Dept: ENDOCRINOLOGY | Facility: MEDICAL CENTER | Age: 71
End: 2021-02-23
Attending: NURSE PRACTITIONER
Payer: MEDICARE

## 2021-02-23 VITALS
SYSTOLIC BLOOD PRESSURE: 124 MMHG | BODY MASS INDEX: 35.16 KG/M2 | HEART RATE: 90 BPM | WEIGHT: 232 LBS | DIASTOLIC BLOOD PRESSURE: 74 MMHG | OXYGEN SATURATION: 92 % | HEIGHT: 68 IN

## 2021-02-23 DIAGNOSIS — E55.9 VITAMIN D DEFICIENCY: ICD-10-CM

## 2021-02-23 DIAGNOSIS — E10.65 UNCONTROLLED TYPE 1 DIABETES MELLITUS WITH HYPERGLYCEMIA (HCC): ICD-10-CM

## 2021-02-23 DIAGNOSIS — E03.9 ACQUIRED HYPOTHYROIDISM: ICD-10-CM

## 2021-02-23 DIAGNOSIS — E78.5 DYSLIPIDEMIA: ICD-10-CM

## 2021-02-23 LAB
HBA1C MFR BLD: 5.8 % (ref 0–5.6)
INT CON NEG: ABNORMAL
INT CON POS: ABNORMAL

## 2021-02-23 PROCEDURE — 99213 OFFICE O/P EST LOW 20 MIN: CPT | Performed by: NURSE PRACTITIONER

## 2021-02-23 PROCEDURE — 99214 OFFICE O/P EST MOD 30 MIN: CPT | Performed by: NURSE PRACTITIONER

## 2021-02-23 PROCEDURE — 83036 HEMOGLOBIN GLYCOSYLATED A1C: CPT | Performed by: NURSE PRACTITIONER

## 2021-02-23 ASSESSMENT — FIBROSIS 4 INDEX: FIB4 SCORE: 1.9

## 2021-02-23 NOTE — PROGRESS NOTES
"RN-CDE Note    Subjective:   Endocrinology Clinic Progress Note  PCP: Blaire Perez M.D.    HPI:  Michelle Espinoza is a 70 y.o. old patient who is seen today for review of Type 1 Diabetes and Hyp[othyroidism.  She is feeling tired and gaining weight.  Now on oxygen at 4 L/NC.    DM:   Last A1c:   Lab Results   Component Value Date/Time    HBA1C 6.1 (A) 10/28/2020 10:55 AM      Previous A1c was 6.1 on 10/28/20      Diabetes Medications:   Tresiba 44 units daily  FIASP 5-8 units before meals  Taking above medications as prescribed: yes  Taking daily ASA: Yes    Exercise: Very little due to respiratory problems  Diet: \"healthy\" diet  in general  Patient's body mass index is 35.28 kg/m². Exercise and nutrition counseling were performed at this visit.    Glucose monitoring frequency: Testing blood sugars twice daily  100-140  Hypoglycemic episodes: no  Last Retinal Exam: 2 months ago with Dr. Manuel  Daily Foot Exam: Yes   Foot Exam:  Monofilament: done  Monofilament testing with a 10 gram force: sensation intact: decreased bilaterally  Visual Inspection: Feet without maceration, ulcers, fissures.  Pedal pulses: intact bilaterally   Lab Results   Component Value Date/Time    MALBCRT see below 07/22/2020 06:56 AM    MICROALBUR <1.2 07/22/2020 06:56 AM      ACR Albumin/Creatinine Ratio goal <30   Currently Rx ACE/ARB: Yes   Dyslipidemia:  Lab Results   Component Value Date/Time    CHOLSTRLTOT 184 07/22/2020 06:56 AM    LDL 92 07/22/2020 06:56 AM    HDL 73 07/22/2020 06:56 AM    TRIGLYCERIDE 96 07/22/2020 06:56 AM       Currently Rx Statin: Yes     She  reports that she has been smoking cigarettes. She has a 40.00 pack-year smoking history. She has never used smokeless tobacco.      Plan:     Discussed and educated on:   - All medications, side effects and compliance (discussed carefully)  - Annual eye examinations at Ophthalmology  - Home glucose monitoring emphasized  - Weight control and daily exercise    Recommended " medication changes: She is interested in getting patient assistance for her insulin.

## 2021-02-24 RX ORDER — LEVOTHYROXINE SODIUM 112 UG/1
112 TABLET ORAL
Qty: 90 TABLET | Refills: 4 | Status: SHIPPED | OUTPATIENT
Start: 2021-02-24 | End: 2022-05-04

## 2021-02-24 ASSESSMENT — PATIENT HEALTH QUESTIONNAIRE - PHQ9: CLINICAL INTERPRETATION OF PHQ2 SCORE: 0

## 2021-02-25 NOTE — PROGRESS NOTES
CHIEF COMPLAINT: Patient is here for follow up of Type 1 Diabetes Mellitus, Hypothyroidism, Dyslipidemia and Vitamin D Deficiency.   Previously seen by Dr. Moreno.     HPI:     Michelle Espinoza is a 70 y.o. female with for continued evaluation & treatment of the followin. Type 1 Diabetes Mellitus   Last appointment in 2020 with Dr. Moreno.   Pt states she's doing well since her last appointment with Endocrinology.    Current Diabetes Regimen:  Tresiba 44 units per day  Fiasp taking between 5-8 units with meals     Ref. Range 2021 11:21   Glycohemoglobin Latest Ref Range: 0.0 - 5.6 % 5.8 (A)       BG Diary:21 Testing glucose twice per day.   Average 100-140s.  Denies hypoglycemic events. Pt is hypoglycemic awareness.     Weight increased 7 pounds since last appointment.    Diabetes Complications   Retinopathy: No known retinopathy.  Last eye exam: 2020-Dr. Manuel.   Neuropathy: Denies paresthesias or numbness in hands or feet. Denies any foot wounds.  Exercise: Minimal.  Diet: Fair.    Pt with history of asthma and COPD. Now on home oxygen. Using a portable concentrator for community activities. Pt has increased fatigue secondary to her oxygen demands and has limited physical activity.     2. Hypothyroidism   Currently taking Levothyroxine 100mcg QD.   Positive for cold intolerance and mental fogginess.   Pt takes hormone 1 hour before breakfast.   Denies taking calcium, iron and antacid supplements.      Ref. Range 2021 07:46   TSH Latest Ref Range: 0.380 - 5.330 uIU/mL 2.270   Free T-4 Latest Ref Range: 0.93 - 1.70 ng/dL 1.17   T3,Free Latest Ref Range: 2.00 - 4.40 pg/mL 2.59     3. Dyslipidemia  Currently taking Lovastatin 20mg QD.   Denies muscle fatigue and weakness.      Ref. Range 2020 06:56   Cholesterol,Tot Latest Ref Range: 100 - 199 mg/dL 184   Triglycerides Latest Ref Range: 0 - 149 mg/dL 96   HDL Latest Ref Range: >=40 mg/dL 73   LDL Latest Ref Range: <100 mg/dL  92      Ref. Range 2/17/2021 07:46   AST(SGOT) Latest Ref Range: 12 - 45 U/L 23   ALT(SGPT) Latest Ref Range: 2 - 50 U/L 18   Alkaline Phosphatase Latest Ref Range: 30 - 99 U/L 84       4. Vitamin D Deficiency  Currently taking Vitamin D 2000IU QD.      Ref. Range 2/17/2021 07:46   25-Hydroxy   Vitamin D 25 Latest Ref Range: 30 - 100 ng/mL 71       Patient's medications, allergies, and social histories were reviewed and updated as appropriate.    ROS:     CONS:     No fever, no chills   EYES:     No diplopia, no blurry vision   CV:           No chest pain, no palpitations   PULM:     No SOB, no cough, no hemoptysis.   GI:            No nausea, no vomiting, no diarrhea, no constipation   ENDO:     No polyuria, no polydipsia, no heat intolerance, no cold intolerance       Past Medical History:  Problem List:  2016-12: Influenza A  2016-12: COPD (chronic obstructive pulmonary disease) ([facility])  2016-12: Osteoporosis  2016-12: Pneumonia  2016-12: Acute on chronic respiratory failure with hypoxia ([facility])  2016-10: Hypotension  2016-10: Dehydration  2016-10: Tobacco abuse  2014-11: Insulin pump status  2014-08: Type 1 diabetes mellitus with neurological manifestations,   uncontrolled ([facility])  2014-08: Diabetic polyneuropathy ([facility])  2014-04: Disorder of bone and cartilage  2014-04: Fitting and adjustment of insulin pump  2014-04: Encounter for long-term (current) use of insulin ([facility])  2014-02: Low back pain  2013-09: Hypothyroid  2013-09: Obesity  2013-09: Postoperative anemia due to acute blood loss  2013-09: Hip pain  2013-08: Hypertension  2013-08: Dyslipidemia  2013-08: Vitamin D deficiency  2013-08: Uncontrolled type 1 diabetes mellitus ([facility])      Past Surgical History:  Past Surgical History:   Procedure Laterality Date   • HIP ARTHROPLASTY TOTAL  9/9/2013    Performed by Pedro Warren M.D. at [facility]   • BREAST RECONSTRUCTION  2001   • MASTECTOMY  1992    right, full   • MASTECTOMY  1987, 2001     "partial, left x2        Allergies:  Bactrim [sulfamethoxazole w-trimethoprim] and Percocet [oxycodone-acetaminophen]     Social History:  Social History     Tobacco Use   • Smoking status: Current Every Day Smoker     Packs/day: 1.00     Years: 40.00     Pack years: 40.00     Types: Cigarettes   • Smokeless tobacco: Never Used   Substance Use Topics   • Alcohol use: Yes     Comment: 2 per day   • Drug use: No        Family History:   family history includes Lung Disease in an other family member.      PHYSICAL EXAM:   OBJECTIVE:  Vital signs: /74   Pulse 90   Ht 1.727 m (5' 8\")   Wt 105 kg (232 lb)   SpO2 92%   BMI 35.28 kg/m²   GENERAL: Well-developed, well-nourished in no apparent distress.   EYE:  No ocular asymmetry, PERRLA  HENT: Pink, moist mucous membranes.    NECK: No thyromegaly.   CARDIOVASCULAR:  No murmurs  LUNGS: Clear breath sounds  ABDOMEN: Soft, nontender   EXTREMITIES: No clubbing, cyanosis, or edema.   NEUROLOGICAL: No gross focal motor abnormalities   LYMPH: No cervical adenopathy palpated.   SKIN: No rashes, lesions.     Labs:  Lab Results   Component Value Date/Time    HBA1C 5.8 (A) 02/23/2021 11:21 AM        Lab Results   Component Value Date/Time    WBC 7.2 06/04/2019 09:07 AM    RBC 4.79 06/04/2019 09:07 AM    HEMOGLOBIN 14.5 06/04/2019 09:07 AM    MCV 91.9 06/04/2019 09:07 AM    MCH 30.3 06/04/2019 09:07 AM    MCHC 33.0 (L) 06/04/2019 09:07 AM    RDW 46.7 06/04/2019 09:07 AM    MPV 10.9 06/04/2019 09:07 AM       Lab Results   Component Value Date/Time    SODIUM 141 02/17/2021 07:46 AM    POTASSIUM 4.6 02/17/2021 07:46 AM    CHLORIDE 103 02/17/2021 07:46 AM    CO2 28 02/17/2021 07:46 AM    ANION 10.0 02/17/2021 07:46 AM    GLUCOSE 78 02/17/2021 07:46 AM    BUN 10 02/17/2021 07:46 AM    CREATININE 0.66 02/17/2021 07:46 AM    CALCIUM 9.3 02/17/2021 07:46 AM    ASTSGOT 23 02/17/2021 07:46 AM    ALTSGPT 18 02/17/2021 07:46 AM    TBILIRUBIN 0.5 02/17/2021 07:46 AM    ALBUMIN 3.8 " 02/17/2021 07:46 AM    TOTPROTEIN 6.5 02/17/2021 07:46 AM    GLOBULIN 2.7 02/17/2021 07:46 AM    AGRATIO 1.4 02/17/2021 07:46 AM       Lab Results   Component Value Date/Time    CHOLSTRLTOT 184 07/22/2020 0656    TRIGLYCERIDE 96 07/22/2020 0656    HDL 73 07/22/2020 0656    LDL 92 07/22/2020 0656       Lab Results   Component Value Date/Time    MALBCRT see below 07/22/2020 06:56 AM    MICROALBUR <1.2 07/22/2020 06:56 AM        Lab Results   Component Value Date/Time    TSHULTRASEN 2.270 02/17/2021 0746     No results found for: FREEDIR  Lab Results   Component Value Date/Time    FREET3 2.59 02/17/2021 0746     No results found for: THYSTIMIG        ASSESSMENT/PLAN:     1. Uncontrolled type 1 diabetes mellitus with hyperglycemia (HCC)  Stable  Current Diabetes Regimen:  Tresiba 44 units per day  Fiasp taking between 5-8 units with meals    Continue balanced meal planning, my plate method.   Recommend 2L-3L of water daily.   Recommend exercise 150 minutes per week.   Daily foot inspection.   Dilated EYE Exam Due in 12/2021.    2. Acquired hypothyroidism  Unstable  Recommend increasing Levothyroxine 112mcg QD.     3. Dyslipidemia  Stable  Continue Lovastatin 20mg QD.     4. Vitamin D deficiency  Stable  Continue Vitamin D 2000IU QD.     Repeat labs 1 week prior to appointment.   Next appointment in 4-5 months.      Thank you kindly for allowing me to participate in the diabetes care plan for this patient.    Rossy Lowry, APRN  02/24/21    CC:   Blaire Perez M.D.

## 2021-02-26 ENCOUNTER — TELEPHONE (OUTPATIENT)
Dept: ENDOCRINOLOGY | Facility: MEDICAL CENTER | Age: 71
End: 2021-02-26

## 2021-03-06 ENCOUNTER — IMMUNIZATION (OUTPATIENT)
Dept: FAMILY PLANNING/WOMEN'S HEALTH CLINIC | Facility: IMMUNIZATION CENTER | Age: 71
End: 2021-03-06
Attending: INTERNAL MEDICINE
Payer: MEDICARE

## 2021-03-06 DIAGNOSIS — Z23 ENCOUNTER FOR VACCINATION: Primary | ICD-10-CM

## 2021-03-06 PROCEDURE — 0002A PFIZER SARS-COV-2 VACCINE: CPT

## 2021-03-06 PROCEDURE — 91300 PFIZER SARS-COV-2 VACCINE: CPT

## 2021-06-23 ENCOUNTER — OFFICE VISIT (OUTPATIENT)
Dept: URGENT CARE | Facility: PHYSICIAN GROUP | Age: 71
End: 2021-06-23
Payer: MEDICARE

## 2021-06-23 VITALS
DIASTOLIC BLOOD PRESSURE: 64 MMHG | WEIGHT: 230 LBS | TEMPERATURE: 97.6 F | HEIGHT: 68 IN | OXYGEN SATURATION: 97 % | HEART RATE: 78 BPM | BODY MASS INDEX: 34.86 KG/M2 | SYSTOLIC BLOOD PRESSURE: 118 MMHG | RESPIRATION RATE: 20 BRPM
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
## 2021-06-23 DIAGNOSIS — Z86.19 HISTORY OF CANDIDIASIS: ICD-10-CM

## 2021-06-23 DIAGNOSIS — L03.114 CELLULITIS OF LEFT UPPER EXTREMITY: Primary | ICD-10-CM

## 2021-06-23 PROCEDURE — 99214 OFFICE O/P EST MOD 30 MIN: CPT | Performed by: NURSE PRACTITIONER

## 2021-06-23 RX ORDER — FLUCONAZOLE 150 MG/1
150 TABLET ORAL DAILY
Qty: 1 TABLET | Refills: 0 | Status: SHIPPED | OUTPATIENT
Start: 2021-06-23 | End: 2021-07-28

## 2021-06-23 RX ORDER — AMOXICILLIN AND CLAVULANATE POTASSIUM 875; 125 MG/1; MG/1
1 TABLET, FILM COATED ORAL 2 TIMES DAILY
Qty: 20 TABLET | Refills: 0 | Status: SHIPPED | OUTPATIENT
Start: 2021-06-23 | End: 2021-07-03

## 2021-06-23 ASSESSMENT — ENCOUNTER SYMPTOMS
BRUISES/BLEEDS EASILY: 0
FEVER: 0
CHILLS: 0
NAUSEA: 0

## 2021-06-23 NOTE — PROGRESS NOTES
Michelle Espinoza is a 70 y.o. female who presents for Dog Bite (left wrist; 1x week)      HPI this new problem.  Michelle is a 70-year-old female presents with a dog bite to her left wrist.  Injury occurred 1 week ago.  She was bathing her dog and then using a hair dryer to dry her hair.  She scared the dog with a hair dryer and the dog flipped over biting her left wrist.  She has been treating it with warm compresses.  The bite wound itself is healed up.  She reports there is no signs of infection until approximately 48 hours ago when it started becoming red.  In the last 24 hours the redness has doubled in size.  There is some tenderness in her left wrist.  Her dog is fully vaccinated.  She is a rescue dog.  She reports that she is usually very sweet dog but gets scared easily.  No other aggravating or alleviating factors.  Her blood sugars reported as 109 this morning.  Patient says she is finally gotten good control of her diabetes.  Review of Systems   Constitutional: Negative for chills, fever and malaise/fatigue.   Gastrointestinal: Negative for nausea.   Musculoskeletal: Negative for joint pain.   Endo/Heme/Allergies: Does not bruise/bleed easily.       Allergies:       Allergies   Allergen Reactions   • Bactrim [Sulfamethoxazole W-Trimethoprim]      hives   • Percocet [Oxycodone-Acetaminophen] Vomiting       PMSFS Hx:  Past Medical History:   Diagnosis Date   • Anesthesia     PONV   • Arthritis    • ASTHMA    • Cancer (HCC)     history of breast cancer   • Dental disorder     dentures, full upper and lower   • Diabetes     insulin pump   • Diabetic neuropathy (HCC)    • EMPHYSEMA    • Encounter for long-term (current) use of insulin ([facility]) 4/23/2014   • Fitting and adjustment of insulin pump 4/23/2014   • Heart burn    • osteopenia 4/23/2014   • Pain     hip   • Psychiatric problem     depression   • Sleep apnea     CPAP   • Unspecified disorder of thyroid     hypothyroid     Past Surgical History:   Procedure  Laterality Date   • HIP ARTHROPLASTY TOTAL  9/9/2013    Performed by Pedro Warren M.D. at SURGERY [facility] ORS   • BREAST RECONSTRUCTION  2001   • MASTECTOMY  1992    right, full   • MASTECTOMY  1987, 2001    partial, left x2     Family History   Problem Relation Age of Onset   • Lung Disease Other      Social History     Tobacco Use   • Smoking status: Current Every Day Smoker     Packs/day: 1.00     Years: 40.00     Pack years: 40.00     Types: Cigarettes   • Smokeless tobacco: Never Used   Substance Use Topics   • Alcohol use: Yes     Comment: 2 per day       Problems:   Patient Active Problem List   Diagnosis   • Hypertension   • Dyslipidemia   • Vitamin D deficiency   • Uncontrolled type 1 diabetes mellitus ([facility])   • Hip pain   • Hypothyroid   • Obesity   • Postoperative anemia due to acute blood loss   • Low back pain   • Disorder of bone and cartilage   • Fitting and adjustment of insulin pump   • Encounter for long-term (current) use of insulin ([facility])   • Type 1 diabetes mellitus with neurological manifestations, uncontrolled ([facility])   • Diabetic polyneuropathy ([facility])   • Insulin pump status   • Tobacco abuse   • Pneumonia   • Acute on chronic respiratory failure with hypoxia ([facility])   • Influenza A   • COPD (chronic obstructive pulmonary disease) ([facility])   • Osteoporosis       Medications:   Current Outpatient Medications on File Prior to Visit   Medication Sig Dispense Refill   • levothyroxine (SYNTHROID) 112 MCG Tab Take 1 tablet by mouth Every morning on an empty stomach. 90 tablet 4   • esomeprazole (NEXIUM) 20 MG capsule Take 40 mg by mouth 2 times a day.     • pantoprazole (PROTONIX) 40 MG Tablet Delayed Response      • montelukast (SINGULAIR) 10 MG Tab Take 10 mg by mouth every day.     • Insulin Aspart, w/Niacinamide, (FIASP) 100 UNIT/ML Solution Inject 15 Units as instructed 3 times a day before meals.     • Multiple Vitamins-Minerals (MULTIVITAMIN ADULT PO) Take  by mouth.     • Cyanocobalamin  "(VITAMIN B 12 PO) Take 1,000 mcg by mouth.     • vitamin D (CHOLECALCIFEROL) 1000 UNIT Tab Take 2,000 Units by mouth every day.     • captopril (CAPOTEN) 12.5 MG Tab      • ipratropium-albuterol (DUONEB) 0.5-2.5 (3) MG/3ML nebulizer solution 3 mL by Nebulization route every 6 hours as needed for Shortness of Breath. 25 Bullet 0   • albuterol 108 (90 BASE) MCG/ACT Aero Soln inhalation aerosol Inhale 2 Puffs by mouth every 6 hours as needed for Shortness of Breath.     • cetirizine (ZYRTEC) 10 MG Tab Take 10 mg by mouth 2 times a day.     • B Complex Cap Take 1 Cap by mouth every day.     • celecoxib (CELEBREX) 200 MG CAPS Take 200 mg by mouth every day.     • albuterol (PROVENTIL) 2.5mg/3ml NEBU 2.5 mg by Nebulization route every four hours as needed for Shortness of Breath. Dose unknown     • fluoxetine (PROZAC) 20 MG CAPS Take 20 mg by mouth every day.     • raloxifene (EVISTA) 60 MG TABS Take 60 mg by mouth every day.     • tiotropium (SPIRIVA HANDIHALER) 18 MCG CAPS Inhale 18 mcg by mouth every day.     • aspirin EC (ECOTRIN) 81 MG TBEC Take 81 mg by mouth every day.     • lovastatin (MEVACOR) 20 MG TABS Take 20 mg by mouth every evening.     • beclomethasone (QVAR) 80 MCG/ACT inhaler Inhale 2 Puffs by mouth 2 times a day. Indications: Chronic Bronchitis with Asthma       No current facility-administered medications on file prior to visit.          Objective:     /64 (BP Location: Right arm, Patient Position: Sitting, BP Cuff Size: Adult)   Pulse 78   Temp 36.4 °C (97.6 °F) (Temporal)   Resp 20   Ht 1.727 m (5' 8\")   Wt 104 kg (230 lb)   SpO2 97%   BMI 34.97 kg/m²     Physical Exam  Vitals reviewed.   Constitutional:       Appearance: Normal appearance. She is normal weight.   HENT:      Head: Normocephalic.      Mouth/Throat:      Mouth: Mucous membranes are moist.   Cardiovascular:      Rate and Rhythm: Normal rate and regular rhythm.      Pulses: Normal pulses.      Heart sounds: Normal heart " sounds.   Pulmonary:      Effort: Pulmonary effort is normal.      Breath sounds: Normal breath sounds.   Skin:     General: Skin is warm.      Capillary Refill: Capillary refill takes less than 2 seconds.          Neurological:      Mental Status: She is alert.   Psychiatric:         Mood and Affect: Mood normal.         Behavior: Behavior normal.         Thought Content: Thought content normal.         Assessment /Associated Orders:      1. Cellulitis of left upper extremity  amoxicillin-clavulanate (AUGMENTIN) 875-125 MG Tab   2. Dog bite, initial encounter     3. Type 1 diabetes mellitus with neurological manifestations, uncontrolled (HCC)  amoxicillin-clavulanate (AUGMENTIN) 875-125 MG Tab   4. History of candidiasis  fluconazole (DIFLUCAN) 150 MG tablet         Medical Decision Making:    Pt is clinically stable at today's acute urgent care visit.  No acute distress noted. Appropriate for outpatient management at this time.   Acute problem today with uncertain prognosis.   This is a provoked dog bite and 70-year-old female with type 1 diabetes.  Currently her hemoglobin A1c is controlled at 5.8.  She is taking her medications as they have been prescribed.  We will start on antibiotics today.  She should follow-up in 2 to 3 days if symptoms have not improved significantly.  I have marked the advancing erythema on her left wrist as an objective way to observe improvement.  Warm compresses BID    Advised to follow-up with the primary care provider for recheck, reevaluation, and consideration of further management if necessary.   Discussed management options (risks,benefits, and alternatives to treatment). Expressed understanding and the treatment plan was agreed upon. Questions were encouraged and answered       Return to urgent care prn if new or worsening sx or if there is no improvement in condition prn.  Educated in Red flags and indications to immediately call 911 or present to the Emergency Department.      I personally reviewed prior external notes and test results pertinent to today's visit.  I have independently reviewed and interpreted all diagnostics ordered during this urgent care acute visit.   Time spent evaluating this patient was at least 30 minutes and includes preparing for visit, counseling/education, exam and evaluation, obtaining history, independent interpretation, ordering lab/test/procedures,medication management and documentation.

## 2021-06-29 ENCOUNTER — PATIENT MESSAGE (OUTPATIENT)
Dept: HEALTH INFORMATION MANAGEMENT | Facility: OTHER | Age: 71
End: 2021-06-29

## 2021-07-22 ENCOUNTER — HOSPITAL ENCOUNTER (OUTPATIENT)
Dept: LAB | Facility: MEDICAL CENTER | Age: 71
End: 2021-07-22
Attending: NURSE PRACTITIONER
Payer: MEDICARE

## 2021-07-22 LAB
CHOLEST SERPL-MCNC: 192 MG/DL (ref 100–199)
EST. AVERAGE GLUCOSE BLD GHB EST-MCNC: 128 MG/DL
FASTING STATUS PATIENT QL REPORTED: NORMAL
HBA1C MFR BLD: 6.1 % (ref 4–5.6)
HDLC SERPL-MCNC: 91 MG/DL
LDLC SERPL CALC-MCNC: 81 MG/DL
T4 FREE SERPL-MCNC: 1.28 NG/DL (ref 0.93–1.7)
TRIGL SERPL-MCNC: 102 MG/DL (ref 0–149)
TSH SERPL DL<=0.005 MIU/L-ACNC: 1.3 UIU/ML (ref 0.38–5.33)

## 2021-07-22 PROCEDURE — 36415 COLL VENOUS BLD VENIPUNCTURE: CPT

## 2021-07-22 PROCEDURE — 80061 LIPID PANEL: CPT

## 2021-07-22 PROCEDURE — 84439 ASSAY OF FREE THYROXINE: CPT

## 2021-07-22 PROCEDURE — 83036 HEMOGLOBIN GLYCOSYLATED A1C: CPT

## 2021-07-22 PROCEDURE — 84443 ASSAY THYROID STIM HORMONE: CPT

## 2021-07-27 ENCOUNTER — OFFICE VISIT (OUTPATIENT)
Dept: ENDOCRINOLOGY | Facility: MEDICAL CENTER | Age: 71
End: 2021-07-27
Attending: NURSE PRACTITIONER
Payer: MEDICARE

## 2021-07-27 VITALS
BODY MASS INDEX: 35.77 KG/M2 | WEIGHT: 236 LBS | HEART RATE: 84 BPM | SYSTOLIC BLOOD PRESSURE: 118 MMHG | HEIGHT: 68 IN | RESPIRATION RATE: 14 BRPM | OXYGEN SATURATION: 97 % | DIASTOLIC BLOOD PRESSURE: 68 MMHG

## 2021-07-27 DIAGNOSIS — E78.5 DYSLIPIDEMIA: ICD-10-CM

## 2021-07-27 DIAGNOSIS — E03.9 HYPOTHYROIDISM (ACQUIRED): ICD-10-CM

## 2021-07-27 DIAGNOSIS — E55.9 VITAMIN D DEFICIENCY: ICD-10-CM

## 2021-07-27 PROCEDURE — 99214 OFFICE O/P EST MOD 30 MIN: CPT | Performed by: NURSE PRACTITIONER

## 2021-07-27 PROCEDURE — 99211 OFF/OP EST MAY X REQ PHY/QHP: CPT | Performed by: NURSE PRACTITIONER

## 2021-07-27 RX ORDER — BECLOMETHASONE DIPROPIONATE HFA 80 UG/1
2 AEROSOL, METERED RESPIRATORY (INHALATION)
COMMUNITY
Start: 2021-07-19 | End: 2021-07-28

## 2021-07-27 NOTE — PROGRESS NOTES
CHIEF COMPLAINT: Patient is here for follow up of Type 1 Diabetes Mellitus, Hypothyroidism, Dyslipidemia and Vitamin D Deficiency.   Previously seen by Dr. Moreno.     HPI:     Michelle Espinoza is a 70 y.o. female with for continued evaluation & treatment of the followin. Type 1 Diabetes Mellitus   Pt states she's doing well since her last appointment with Endocrinology.    Current Diabetes Regimen:  Tresiba 44 units per day  Fiasp taking between 5-8 units with meals.    Serum glycohemoglobin 2021: 6.1%  Point-of-care A1c 2021: 5.8%    BG Diary: 2021 testing glucose twice per day.   Average .  Patient states she feels fine when the blood sugar is the mid 60s.  She does not get start getting symptoms until she is in the 50s.  She gets up in the morning her blood sugar is in the 60s and she will need something.  She is never required assistance for blood sugars when they are in the 60s.  .  Denies hypoglycemic events. Pt is hypoglycemic awareness.     Weight increased 7 pounds since last appointment.    Diabetes Complications   Retinopathy: No known retinopathy.  Last eye exam: 2020-Dr. Manuel.   Neuropathy: Denies paresthesias or numbness in hands or feet. Denies any foot wounds.  Exercise: Minimal.  Diet: Fair.    Pt with history of asthma and COPD. Now on home oxygen. Using a portable concentrator for community activities. Pt has increased fatigue secondary to her oxygen demands and has limited physical activity.     BP currently 118/68.  No current ACE or ARB therapy.     Ref. Range 2020 06:56   Creatinine, Urine Latest Units: mg/dL 58.73   Microalbumin, Urine Random Latest Units: mg/dL <1.2       2. Hypothyroidism   Currently taking Levothyroxine 112mcg QD.  This is been her same dosage for 4 months.  Patient denies mental fogginess and constipation.  Patient states she is always been cold.  Patient denies palpitations and increased anxiousness.  Pt takes thyroid hormone 1  hour before breakfast.   Denies taking calcium, iron and antacid supplements.      Ref. Range 7/22/2021 07:20   TSH Latest Ref Range: 0.380 - 5.330 uIU/mL 1.300   Free T-4 Latest Ref Range: 0.93 - 1.70 ng/dL 1.28       3. Dyslipidemia  Currently taking Lovastatin 20mg QD.   Denies muscle fatigue and weakness.      Ref. Range 7/22/2021 07:20   Cholesterol,Tot Latest Ref Range: 100 - 199 mg/dL 192   Triglycerides Latest Ref Range: 0 - 149 mg/dL 102   HDL Latest Ref Range: >=40 mg/dL 91   LDL Latest Ref Range: <100 mg/dL 81         4. Vitamin D Deficiency  Currently taking Vitamin D 2000IU QD.      Ref. Range 2/17/2021 07:46   25-Hydroxy   Vitamin D 25 Latest Ref Range: 30 - 100 ng/mL 71       Patient's medications, allergies, and social histories were reviewed and updated as appropriate.    ROS:     CONS:     No fever, no chills   EYES:     No diplopia, no blurry vision   CV:           No chest pain, no palpitations   PULM:     No SOB, no cough, no hemoptysis.   GI:            No nausea, no vomiting, no diarrhea, no constipation   ENDO:     No polyuria, no polydipsia, no heat intolerance, no cold intolerance       Past Medical History:  Problem List:  2016-12: Influenza A  2016-12: COPD (chronic obstructive pulmonary disease) ([facility])  2016-12: Osteoporosis  2016-12: Pneumonia  2016-12: Acute on chronic respiratory failure with hypoxia ([facility])  2016-10: Hypotension  2016-10: Dehydration  2016-10: Tobacco abuse  2014-11: Insulin pump status  2014-08: Type 1 diabetes mellitus with neurological manifestations,   uncontrolled ([facility])  2014-08: Diabetic polyneuropathy ([facility])  2014-04: Disorder of bone and cartilage  2014-04: Fitting and adjustment of insulin pump  2014-04: Encounter for long-term (current) use of insulin ([facility])  2014-02: Low back pain  2013-09: Hypothyroid  2013-09: Obesity  2013-09: Postoperative anemia due to acute blood loss  2013-09: Hip pain  2013-08: Hypertension  2013-08: Dyslipidemia  2013-08: Vitamin D  "deficiency  2013-08: Uncontrolled type 1 diabetes mellitus (HCC)      Past Surgical History:  Past Surgical History:   Procedure Laterality Date   • HIP ARTHROPLASTY TOTAL  9/9/2013    Performed by Pedro Warren M.D. at SURGERY [facility]   • BREAST RECONSTRUCTION  2001   • MASTECTOMY  1992    right, full   • MASTECTOMY  1987, 2001    partial, left x2        Allergies:  Bactrim [sulfamethoxazole w-trimethoprim] and Percocet [oxycodone-acetaminophen]     Social History:  Social History     Tobacco Use   • Smoking status: Current Every Day Smoker     Packs/day: 1.00     Years: 40.00     Pack years: 40.00     Types: Cigarettes   • Smokeless tobacco: Never Used   Vaping Use   • Vaping Use: Never used   Substance Use Topics   • Alcohol use: Yes     Comment: 2 per day   • Drug use: No        Family History:   family history includes Lung Disease in an other family member.      PHYSICAL EXAM:   OBJECTIVE:  Vital signs: /68 (BP Location: Left arm, Patient Position: Sitting, BP Cuff Size: Adult)   Pulse 84   Resp 14   Ht 1.727 m (5' 8\")   Wt 107 kg (236 lb)   SpO2 97%   BMI 35.88 kg/m²   GENERAL: Well-developed, well-nourished in no apparent distress.   EYE:  No ocular asymmetry, PERRLA  HENT: Pink, moist mucous membranes.    NECK: No thyromegaly.   CARDIOVASCULAR:  No murmurs  LUNGS: Clear breath sounds  ABDOMEN: Soft, nontender   EXTREMITIES: No clubbing, cyanosis, or edema.   NEUROLOGICAL: No gross focal motor abnormalities   LYMPH: No cervical adenopathy palpated.   SKIN: No rashes, lesions.       ASSESSMENT/PLAN:     1. Uncontrolled type 1 diabetes mellitus with hyperglycemia (HCC)  Stable  Current Diabetes Regimen:  Tresiba 44 units per day  Fiasp taking between 5-8 units with meals    Patient has been approved for drug therapy assistance through the pharmaceutical companies.  The insulin will be shipped to our office and the patient will be notified when the medications are available.    Continue " balanced meal planning, my plate method.   Recommend 2L-3L of water daily.   Recommend exercise 150 minutes per week.   Daily foot inspection.   Dilated EYE Exam Due in 12/2021.    2. Acquired hypothyroidism  Stable.  Recommend increasing Levothyroxine 112mcg QD.     3. Dyslipidemia  Stable  Continue Lovastatin 20mg QD.     4. Vitamin D deficiency  Stable  Continue Vitamin D 2000IU QD.     Repeat labs 1 week prior to appointment.   Next appointment in 4-5 months.      Thank you kindly for allowing me to participate in the diabetes care plan for this patient.    Rossy Lowry, APRN  07/27/2021.      CC:   Blaire Perez M.D.

## 2021-08-30 PROCEDURE — 85007 BL SMEAR W/DIFF WBC COUNT: CPT

## 2021-08-30 PROCEDURE — 85027 COMPLETE CBC AUTOMATED: CPT

## 2021-08-30 PROCEDURE — 80053 COMPREHEN METABOLIC PANEL: CPT

## 2021-08-30 PROCEDURE — 93005 ELECTROCARDIOGRAM TRACING: CPT

## 2021-08-30 PROCEDURE — 93005 ELECTROCARDIOGRAM TRACING: CPT | Performed by: EMERGENCY MEDICINE

## 2021-08-30 PROCEDURE — 99285 EMERGENCY DEPT VISIT HI MDM: CPT

## 2021-08-31 ENCOUNTER — APPOINTMENT (OUTPATIENT)
Dept: RADIOLOGY | Facility: MEDICAL CENTER | Age: 71
DRG: 871 | End: 2021-08-31
Attending: EMERGENCY MEDICINE
Payer: MEDICARE

## 2021-08-31 ENCOUNTER — HOSPITAL ENCOUNTER (INPATIENT)
Facility: MEDICAL CENTER | Age: 71
LOS: 8 days | DRG: 871 | End: 2021-09-08
Attending: EMERGENCY MEDICINE | Admitting: STUDENT IN AN ORGANIZED HEALTH CARE EDUCATION/TRAINING PROGRAM
Payer: MEDICARE

## 2021-08-31 ENCOUNTER — DOCUMENTATION (OUTPATIENT)
Dept: RESPIRATORY THERAPY | Facility: MEDICAL CENTER | Age: 71
End: 2021-08-31

## 2021-08-31 DIAGNOSIS — A41.9 SEPSIS, DUE TO UNSPECIFIED ORGANISM, UNSPECIFIED WHETHER ACUTE ORGAN DYSFUNCTION PRESENT (HCC): ICD-10-CM

## 2021-08-31 DIAGNOSIS — J18.9 PNEUMONIA OF RIGHT MIDDLE LOBE DUE TO INFECTIOUS ORGANISM: ICD-10-CM

## 2021-08-31 DIAGNOSIS — J41.0 SIMPLE CHRONIC BRONCHITIS (HCC): ICD-10-CM

## 2021-08-31 DIAGNOSIS — Z72.0 TOBACCO ABUSE: Chronic | ICD-10-CM

## 2021-08-31 DIAGNOSIS — A41.9 SEPSIS WITHOUT ACUTE ORGAN DYSFUNCTION, DUE TO UNSPECIFIED ORGANISM (HCC): ICD-10-CM

## 2021-08-31 PROBLEM — D69.6 THROMBOCYTOPENIA (HCC): Status: ACTIVE | Noted: 2021-08-31

## 2021-08-31 LAB
ALBUMIN SERPL BCP-MCNC: 3.3 G/DL (ref 3.2–4.9)
ALBUMIN/GLOB SERPL: 1.1 G/DL
ALP SERPL-CCNC: 134 U/L (ref 30–99)
ALT SERPL-CCNC: 22 U/L (ref 2–50)
ANION GAP SERPL CALC-SCNC: 11 MMOL/L (ref 7–16)
ANION GAP SERPL CALC-SCNC: 12 MMOL/L (ref 7–16)
APPEARANCE UR: ABNORMAL
AST SERPL-CCNC: 25 U/L (ref 12–45)
BASOPHILS # BLD AUTO: 0 % (ref 0–1.8)
BASOPHILS # BLD AUTO: 0.1 % (ref 0–1.8)
BASOPHILS # BLD: 0 K/UL (ref 0–0.12)
BASOPHILS # BLD: 0.02 K/UL (ref 0–0.12)
BILIRUB SERPL-MCNC: 0.4 MG/DL (ref 0.1–1.5)
BILIRUB UR QL STRIP.AUTO: NEGATIVE
BUN SERPL-MCNC: 27 MG/DL (ref 8–22)
BUN SERPL-MCNC: 29 MG/DL (ref 8–22)
CALCIUM SERPL-MCNC: 8.8 MG/DL (ref 8.5–10.5)
CALCIUM SERPL-MCNC: 9 MG/DL (ref 8.5–10.5)
CHLORIDE SERPL-SCNC: 101 MMOL/L (ref 96–112)
CHLORIDE SERPL-SCNC: 103 MMOL/L (ref 96–112)
CO2 SERPL-SCNC: 23 MMOL/L (ref 20–33)
CO2 SERPL-SCNC: 25 MMOL/L (ref 20–33)
COLOR UR: ABNORMAL
CREAT SERPL-MCNC: 1.07 MG/DL (ref 0.5–1.4)
CREAT SERPL-MCNC: 1.12 MG/DL (ref 0.5–1.4)
EKG IMPRESSION: NORMAL
EOSINOPHIL # BLD AUTO: 0 K/UL (ref 0–0.51)
EOSINOPHIL # BLD AUTO: 0 K/UL (ref 0–0.51)
EOSINOPHIL NFR BLD: 0 % (ref 0–6.9)
EOSINOPHIL NFR BLD: 0 % (ref 0–6.9)
ERYTHROCYTE [DISTWIDTH] IN BLOOD BY AUTOMATED COUNT: 47.3 FL (ref 35.9–50)
ERYTHROCYTE [DISTWIDTH] IN BLOOD BY AUTOMATED COUNT: 48.9 FL (ref 35.9–50)
FLUAV RNA SPEC QL NAA+PROBE: NEGATIVE
FLUBV RNA SPEC QL NAA+PROBE: NEGATIVE
GLOBULIN SER CALC-MCNC: 2.9 G/DL (ref 1.9–3.5)
GLUCOSE BLD-MCNC: 193 MG/DL (ref 65–99)
GLUCOSE BLD-MCNC: 215 MG/DL (ref 65–99)
GLUCOSE BLD-MCNC: 447 MG/DL (ref 65–99)
GLUCOSE BLD-MCNC: 508 MG/DL (ref 65–99)
GLUCOSE SERPL-MCNC: 129 MG/DL (ref 65–99)
GLUCOSE SERPL-MCNC: 224 MG/DL (ref 65–99)
GLUCOSE UR STRIP.AUTO-MCNC: NEGATIVE MG/DL
HCT VFR BLD AUTO: 41.6 % (ref 37–47)
HCT VFR BLD AUTO: 44 % (ref 37–47)
HGB BLD-MCNC: 13.7 G/DL (ref 12–16)
HGB BLD-MCNC: 14.8 G/DL (ref 12–16)
IMM GRANULOCYTES # BLD AUTO: 0.69 K/UL (ref 0–0.11)
IMM GRANULOCYTES NFR BLD AUTO: 4.5 % (ref 0–0.9)
INR PPP: 1.11 (ref 0.87–1.13)
KETONES UR STRIP.AUTO-MCNC: ABNORMAL MG/DL
LACTATE BLD-SCNC: 1.7 MMOL/L (ref 0.5–2)
LACTATE BLD-SCNC: 1.8 MMOL/L (ref 0.5–2)
LEUKOCYTE ESTERASE UR QL STRIP.AUTO: ABNORMAL
LYMPHOCYTES # BLD AUTO: 0.41 K/UL (ref 1–4.8)
LYMPHOCYTES # BLD AUTO: 1.33 K/UL (ref 1–4.8)
LYMPHOCYTES NFR BLD: 2.7 % (ref 22–41)
LYMPHOCYTES NFR BLD: 7.7 % (ref 22–41)
MAGNESIUM SERPL-MCNC: 2 MG/DL (ref 1.5–2.5)
MAGNESIUM SERPL-MCNC: 2 MG/DL (ref 1.5–2.5)
MANUAL DIFF BLD: ABNORMAL
MCH RBC QN AUTO: 32.7 PG (ref 27–33)
MCH RBC QN AUTO: 32.9 PG (ref 27–33)
MCHC RBC AUTO-ENTMCNC: 32.9 G/DL (ref 33.6–35)
MCHC RBC AUTO-ENTMCNC: 33.6 G/DL (ref 33.6–35)
MCV RBC AUTO: 97.1 FL (ref 81.4–97.8)
MCV RBC AUTO: 99.8 FL (ref 81.4–97.8)
MICRO URNS: ABNORMAL
MONOCYTES # BLD AUTO: 0.26 K/UL (ref 0–0.85)
MONOCYTES # BLD AUTO: 0.59 K/UL (ref 0–0.85)
MONOCYTES NFR BLD AUTO: 1.7 % (ref 0–13.4)
MONOCYTES NFR BLD AUTO: 3.4 % (ref 0–13.4)
MORPHOLOGY BLD-IMP: NORMAL
MRSA DNA SPEC QL NAA+PROBE: NORMAL
NEUTROPHILS # BLD AUTO: 13.8 K/UL (ref 2–7.15)
NEUTROPHILS # BLD AUTO: 15.38 K/UL (ref 2–7.15)
NEUTROPHILS NFR BLD: 76.9 % (ref 44–72)
NEUTROPHILS NFR BLD: 91 % (ref 44–72)
NEUTS BAND NFR BLD MANUAL: 12 % (ref 0–10)
NITRITE UR QL STRIP.AUTO: POSITIVE
NRBC # BLD AUTO: 0 K/UL
NRBC # BLD AUTO: 0 K/UL
NRBC BLD-RTO: 0 /100 WBC
NRBC BLD-RTO: 0 /100 WBC
NT-PROBNP SERPL IA-MCNC: 2071 PG/ML (ref 0–125)
PH UR STRIP.AUTO: 5.5 [PH] (ref 5–8)
PLATELET # BLD AUTO: 124 K/UL (ref 164–446)
PLATELET # BLD AUTO: 138 K/UL (ref 164–446)
PLATELET BLD QL SMEAR: NORMAL
PMV BLD AUTO: 11 FL (ref 9–12.9)
PMV BLD AUTO: 11.3 FL (ref 9–12.9)
POTASSIUM SERPL-SCNC: 4 MMOL/L (ref 3.6–5.5)
POTASSIUM SERPL-SCNC: 4 MMOL/L (ref 3.6–5.5)
PROCALCITONIN SERPL-MCNC: 44.02 NG/ML
PROT SERPL-MCNC: 6.2 G/DL (ref 6–8.2)
PROT UR QL STRIP: 100 MG/DL
PROTHROMBIN TIME: 14 SEC (ref 12–14.6)
RBC # BLD AUTO: 4.17 M/UL (ref 4.2–5.4)
RBC # BLD AUTO: 4.53 M/UL (ref 4.2–5.4)
RBC BLD AUTO: NORMAL
RBC UR QL AUTO: ABNORMAL
RSV RNA SPEC QL NAA+PROBE: NEGATIVE
SARS-COV-2 RNA RESP QL NAA+PROBE: NOTDETECTED
SIGNIFICANT IND 70042: NORMAL
SITE SITE: NORMAL
SODIUM SERPL-SCNC: 136 MMOL/L (ref 135–145)
SODIUM SERPL-SCNC: 139 MMOL/L (ref 135–145)
SOURCE SOURCE: NORMAL
SP GR UR STRIP.AUTO: 1.02
SPECIMEN SOURCE: NORMAL
UROBILINOGEN UR STRIP.AUTO-MCNC: 1 MG/DL
WBC # BLD AUTO: 15.2 K/UL (ref 4.8–10.8)
WBC # BLD AUTO: 17.3 K/UL (ref 4.8–10.8)

## 2021-08-31 PROCEDURE — 94640 AIRWAY INHALATION TREATMENT: CPT

## 2021-08-31 PROCEDURE — 96366 THER/PROPH/DIAG IV INF ADDON: CPT

## 2021-08-31 PROCEDURE — 700105 HCHG RX REV CODE 258: Performed by: HOSPITALIST

## 2021-08-31 PROCEDURE — 0241U HCHG SARS-COV-2 COVID-19 NFCT DS RESP RNA 4 TRGT MIC: CPT

## 2021-08-31 PROCEDURE — 99406 BEHAV CHNG SMOKING 3-10 MIN: CPT | Performed by: STUDENT IN AN ORGANIZED HEALTH CARE EDUCATION/TRAINING PROGRAM

## 2021-08-31 PROCEDURE — 81001 URINALYSIS AUTO W/SCOPE: CPT

## 2021-08-31 PROCEDURE — 700105 HCHG RX REV CODE 258: Performed by: STUDENT IN AN ORGANIZED HEALTH CARE EDUCATION/TRAINING PROGRAM

## 2021-08-31 PROCEDURE — 700105 HCHG RX REV CODE 258: Performed by: EMERGENCY MEDICINE

## 2021-08-31 PROCEDURE — 85610 PROTHROMBIN TIME: CPT

## 2021-08-31 PROCEDURE — 85025 COMPLETE CBC W/AUTO DIFF WBC: CPT

## 2021-08-31 PROCEDURE — 99223 1ST HOSP IP/OBS HIGH 75: CPT | Mod: 25,AI | Performed by: STUDENT IN AN ORGANIZED HEALTH CARE EDUCATION/TRAINING PROGRAM

## 2021-08-31 PROCEDURE — 96367 TX/PROPH/DG ADDL SEQ IV INF: CPT

## 2021-08-31 PROCEDURE — 87641 MR-STAPH DNA AMP PROBE: CPT

## 2021-08-31 PROCEDURE — 94664 DEMO&/EVAL PT USE INHALER: CPT

## 2021-08-31 PROCEDURE — 700111 HCHG RX REV CODE 636 W/ 250 OVERRIDE (IP): Performed by: STUDENT IN AN ORGANIZED HEALTH CARE EDUCATION/TRAINING PROGRAM

## 2021-08-31 PROCEDURE — 87186 SC STD MICRODIL/AGAR DIL: CPT

## 2021-08-31 PROCEDURE — 700111 HCHG RX REV CODE 636 W/ 250 OVERRIDE (IP): Performed by: EMERGENCY MEDICINE

## 2021-08-31 PROCEDURE — A9270 NON-COVERED ITEM OR SERVICE: HCPCS | Performed by: STUDENT IN AN ORGANIZED HEALTH CARE EDUCATION/TRAINING PROGRAM

## 2021-08-31 PROCEDURE — 96372 THER/PROPH/DIAG INJ SC/IM: CPT

## 2021-08-31 PROCEDURE — 87077 CULTURE AEROBIC IDENTIFY: CPT | Mod: 91

## 2021-08-31 PROCEDURE — A9270 NON-COVERED ITEM OR SERVICE: HCPCS | Performed by: EMERGENCY MEDICINE

## 2021-08-31 PROCEDURE — 770006 HCHG ROOM/CARE - MED/SURG/GYN SEMI*

## 2021-08-31 PROCEDURE — 700102 HCHG RX REV CODE 250 W/ 637 OVERRIDE(OP): Performed by: STUDENT IN AN ORGANIZED HEALTH CARE EDUCATION/TRAINING PROGRAM

## 2021-08-31 PROCEDURE — 700102 HCHG RX REV CODE 250 W/ 637 OVERRIDE(OP): Performed by: EMERGENCY MEDICINE

## 2021-08-31 PROCEDURE — 87086 URINE CULTURE/COLONY COUNT: CPT

## 2021-08-31 PROCEDURE — 700111 HCHG RX REV CODE 636 W/ 250 OVERRIDE (IP): Performed by: HOSPITALIST

## 2021-08-31 PROCEDURE — 84145 PROCALCITONIN (PCT): CPT

## 2021-08-31 PROCEDURE — 700102 HCHG RX REV CODE 250 W/ 637 OVERRIDE(OP): Performed by: HOSPITALIST

## 2021-08-31 PROCEDURE — 96375 TX/PRO/DX INJ NEW DRUG ADDON: CPT

## 2021-08-31 PROCEDURE — 82962 GLUCOSE BLOOD TEST: CPT

## 2021-08-31 PROCEDURE — A9270 NON-COVERED ITEM OR SERVICE: HCPCS | Performed by: HOSPITALIST

## 2021-08-31 PROCEDURE — 80048 BASIC METABOLIC PNL TOTAL CA: CPT

## 2021-08-31 PROCEDURE — 94669 MECHANICAL CHEST WALL OSCILL: CPT

## 2021-08-31 PROCEDURE — 87040 BLOOD CULTURE FOR BACTERIA: CPT | Mod: 91

## 2021-08-31 PROCEDURE — 96365 THER/PROPH/DIAG IV INF INIT: CPT

## 2021-08-31 PROCEDURE — 700101 HCHG RX REV CODE 250: Performed by: HOSPITALIST

## 2021-08-31 PROCEDURE — 83605 ASSAY OF LACTIC ACID: CPT

## 2021-08-31 PROCEDURE — 99406 BEHAV CHNG SMOKING 3-10 MIN: CPT

## 2021-08-31 PROCEDURE — 71045 X-RAY EXAM CHEST 1 VIEW: CPT

## 2021-08-31 PROCEDURE — 83880 ASSAY OF NATRIURETIC PEPTIDE: CPT

## 2021-08-31 PROCEDURE — 83735 ASSAY OF MAGNESIUM: CPT

## 2021-08-31 RX ORDER — IPRATROPIUM BROMIDE AND ALBUTEROL SULFATE 2.5; .5 MG/3ML; MG/3ML
3 SOLUTION RESPIRATORY (INHALATION)
Status: DISCONTINUED | OUTPATIENT
Start: 2021-08-31 | End: 2021-09-01

## 2021-08-31 RX ORDER — AMOXICILLIN 250 MG
2 CAPSULE ORAL 2 TIMES DAILY
Status: DISCONTINUED | OUTPATIENT
Start: 2021-08-31 | End: 2021-09-08 | Stop reason: HOSPADM

## 2021-08-31 RX ORDER — ACETAMINOPHEN 325 MG/1
650 TABLET ORAL EVERY 6 HOURS PRN
Status: DISCONTINUED | OUTPATIENT
Start: 2021-08-31 | End: 2021-09-08 | Stop reason: HOSPADM

## 2021-08-31 RX ORDER — NICOTINE 21 MG/24HR
14 PATCH, TRANSDERMAL 24 HOURS TRANSDERMAL
Status: DISCONTINUED | OUTPATIENT
Start: 2021-08-31 | End: 2021-09-08 | Stop reason: HOSPADM

## 2021-08-31 RX ORDER — INSULIN LISPRO 100 [IU]/ML
10 INJECTION, SOLUTION INTRAVENOUS; SUBCUTANEOUS ONCE
Status: COMPLETED | OUTPATIENT
Start: 2021-08-31 | End: 2021-08-31

## 2021-08-31 RX ORDER — DEXTROSE MONOHYDRATE 25 G/50ML
50 INJECTION, SOLUTION INTRAVENOUS
Status: DISCONTINUED | OUTPATIENT
Start: 2021-08-31 | End: 2021-08-31

## 2021-08-31 RX ORDER — OMEPRAZOLE 20 MG/1
20 CAPSULE, DELAYED RELEASE ORAL DAILY
Status: DISCONTINUED | OUTPATIENT
Start: 2021-08-31 | End: 2021-09-02

## 2021-08-31 RX ORDER — PREDNISONE 20 MG/1
40 TABLET ORAL DAILY
Status: DISCONTINUED | OUTPATIENT
Start: 2021-08-31 | End: 2021-09-02

## 2021-08-31 RX ORDER — GUAIFENESIN/DEXTROMETHORPHAN 100-10MG/5
10 SYRUP ORAL EVERY 6 HOURS PRN
Status: DISCONTINUED | OUTPATIENT
Start: 2021-08-31 | End: 2021-09-08 | Stop reason: HOSPADM

## 2021-08-31 RX ORDER — ONDANSETRON 4 MG/1
4 TABLET, ORALLY DISINTEGRATING ORAL EVERY 4 HOURS PRN
Status: DISCONTINUED | OUTPATIENT
Start: 2021-08-31 | End: 2021-09-08 | Stop reason: HOSPADM

## 2021-08-31 RX ORDER — IBUPROFEN 600 MG/1
600 TABLET ORAL EVERY 6 HOURS PRN
Status: DISCONTINUED | OUTPATIENT
Start: 2021-08-31 | End: 2021-09-05

## 2021-08-31 RX ORDER — DEXTROSE MONOHYDRATE 25 G/50ML
50 INJECTION, SOLUTION INTRAVENOUS
Status: DISCONTINUED | OUTPATIENT
Start: 2021-08-31 | End: 2021-09-07

## 2021-08-31 RX ORDER — IPRATROPIUM BROMIDE AND ALBUTEROL SULFATE 2.5; .5 MG/3ML; MG/3ML
3 SOLUTION RESPIRATORY (INHALATION)
Status: DISCONTINUED | OUTPATIENT
Start: 2021-08-31 | End: 2021-09-05

## 2021-08-31 RX ORDER — POLYETHYLENE GLYCOL 3350 17 G/17G
1 POWDER, FOR SOLUTION ORAL
Status: DISCONTINUED | OUTPATIENT
Start: 2021-08-31 | End: 2021-09-08 | Stop reason: HOSPADM

## 2021-08-31 RX ORDER — TIOTROPIUM BROMIDE 18 UG/1
1 CAPSULE ORAL; RESPIRATORY (INHALATION) DAILY
Status: DISCONTINUED | OUTPATIENT
Start: 2021-08-31 | End: 2021-08-31

## 2021-08-31 RX ORDER — LEVOTHYROXINE SODIUM 112 UG/1
112 TABLET ORAL
Status: DISCONTINUED | OUTPATIENT
Start: 2021-08-31 | End: 2021-09-08 | Stop reason: HOSPADM

## 2021-08-31 RX ORDER — ALBUTEROL SULFATE 90 UG/1
1-2 AEROSOL, METERED RESPIRATORY (INHALATION) EVERY 4 HOURS PRN
COMMUNITY
End: 2023-02-17 | Stop reason: SDUPTHER

## 2021-08-31 RX ORDER — BISACODYL 10 MG
10 SUPPOSITORY, RECTAL RECTAL
Status: DISCONTINUED | OUTPATIENT
Start: 2021-08-31 | End: 2021-09-08 | Stop reason: HOSPADM

## 2021-08-31 RX ORDER — METHYLPREDNISOLONE SODIUM SUCCINATE 125 MG/2ML
125 INJECTION, POWDER, LYOPHILIZED, FOR SOLUTION INTRAMUSCULAR; INTRAVENOUS ONCE
Status: COMPLETED | OUTPATIENT
Start: 2021-08-31 | End: 2021-08-31

## 2021-08-31 RX ORDER — SODIUM CHLORIDE, SODIUM LACTATE, POTASSIUM CHLORIDE, CALCIUM CHLORIDE 600; 310; 30; 20 MG/100ML; MG/100ML; MG/100ML; MG/100ML
INJECTION, SOLUTION INTRAVENOUS CONTINUOUS
Status: ACTIVE | OUTPATIENT
Start: 2021-08-31 | End: 2021-08-31

## 2021-08-31 RX ORDER — MONTELUKAST SODIUM 10 MG/1
10 TABLET ORAL DAILY
Status: DISCONTINUED | OUTPATIENT
Start: 2021-08-31 | End: 2021-09-08 | Stop reason: HOSPADM

## 2021-08-31 RX ORDER — ALBUTEROL SULFATE 90 UG/1
10 AEROSOL, METERED RESPIRATORY (INHALATION) ONCE
Status: COMPLETED | OUTPATIENT
Start: 2021-08-31 | End: 2021-08-31

## 2021-08-31 RX ORDER — CETIRIZINE HYDROCHLORIDE 10 MG/1
10 TABLET ORAL DAILY
Status: DISCONTINUED | OUTPATIENT
Start: 2021-08-31 | End: 2021-09-02

## 2021-08-31 RX ORDER — SODIUM CHLORIDE 9 MG/ML
250 INJECTION, SOLUTION INTRAVENOUS ONCE
Status: COMPLETED | OUTPATIENT
Start: 2021-08-31 | End: 2021-08-31

## 2021-08-31 RX ORDER — FLUOXETINE HYDROCHLORIDE 20 MG/1
20 CAPSULE ORAL DAILY
Status: DISCONTINUED | OUTPATIENT
Start: 2021-08-31 | End: 2021-09-08 | Stop reason: HOSPADM

## 2021-08-31 RX ORDER — RALOXIFENE HYDROCHLORIDE 60 MG/1
60 TABLET, FILM COATED ORAL DAILY
Status: DISCONTINUED | OUTPATIENT
Start: 2021-08-31 | End: 2021-09-08 | Stop reason: HOSPADM

## 2021-08-31 RX ORDER — FLUTICASONE PROPIONATE 110 UG/1
2 AEROSOL, METERED RESPIRATORY (INHALATION)
Status: DISCONTINUED | OUTPATIENT
Start: 2021-08-31 | End: 2021-09-05

## 2021-08-31 RX ORDER — ALBUTEROL SULFATE 90 UG/1
1-2 AEROSOL, METERED RESPIRATORY (INHALATION)
Status: DISCONTINUED | OUTPATIENT
Start: 2021-08-31 | End: 2021-09-05

## 2021-08-31 RX ORDER — ONDANSETRON 2 MG/ML
4 INJECTION INTRAMUSCULAR; INTRAVENOUS EVERY 4 HOURS PRN
Status: DISCONTINUED | OUTPATIENT
Start: 2021-08-31 | End: 2021-09-08 | Stop reason: HOSPADM

## 2021-08-31 RX ORDER — GUAIFENESIN 600 MG/1
1200 TABLET, EXTENDED RELEASE ORAL 2 TIMES DAILY
Status: DISCONTINUED | OUTPATIENT
Start: 2021-08-31 | End: 2021-09-08 | Stop reason: HOSPADM

## 2021-08-31 RX ORDER — LOVASTATIN 20 MG/1
20 TABLET ORAL NIGHTLY
Status: DISCONTINUED | OUTPATIENT
Start: 2021-08-31 | End: 2021-09-08 | Stop reason: HOSPADM

## 2021-08-31 RX ORDER — INSULIN DEGLUDEC 100 U/ML
50 INJECTION, SOLUTION SUBCUTANEOUS DAILY
COMMUNITY
End: 2024-01-24 | Stop reason: SDUPTHER

## 2021-08-31 RX ADMIN — IBUPROFEN 600 MG: 600 TABLET ORAL at 05:06

## 2021-08-31 RX ADMIN — SODIUM CHLORIDE 250 ML: 9 INJECTION, SOLUTION INTRAVENOUS at 03:18

## 2021-08-31 RX ADMIN — SODIUM CHLORIDE, POTASSIUM CHLORIDE, SODIUM LACTATE AND CALCIUM CHLORIDE: 600; 310; 30; 20 INJECTION, SOLUTION INTRAVENOUS at 14:26

## 2021-08-31 RX ADMIN — INSULIN HUMAN 1 UNITS: 100 INJECTION, SOLUTION PARENTERAL at 06:08

## 2021-08-31 RX ADMIN — INSULIN GLARGINE 30 UNITS: 100 INJECTION, SOLUTION SUBCUTANEOUS at 14:05

## 2021-08-31 RX ADMIN — FLUTICASONE PROPIONATE 220 MCG: 110 AEROSOL, METERED RESPIRATORY (INHALATION) at 05:59

## 2021-08-31 RX ADMIN — ENOXAPARIN SODIUM 40 MG: 40 INJECTION SUBCUTANEOUS at 05:59

## 2021-08-31 RX ADMIN — SODIUM CHLORIDE 3 G: 900 INJECTION INTRAVENOUS at 10:28

## 2021-08-31 RX ADMIN — PREDNISONE 40 MG: 20 TABLET ORAL at 14:04

## 2021-08-31 RX ADMIN — IPRATROPIUM BROMIDE AND ALBUTEROL SULFATE 3 ML: .5; 2.5 SOLUTION RESPIRATORY (INHALATION) at 15:41

## 2021-08-31 RX ADMIN — SODIUM CHLORIDE, POTASSIUM CHLORIDE, SODIUM LACTATE AND CALCIUM CHLORIDE: 600; 310; 30; 20 INJECTION, SOLUTION INTRAVENOUS at 06:00

## 2021-08-31 RX ADMIN — LOVASTATIN 20 MG: 20 TABLET ORAL at 21:50

## 2021-08-31 RX ADMIN — MONTELUKAST 10 MG: 10 TABLET, FILM COATED ORAL at 05:58

## 2021-08-31 RX ADMIN — SODIUM CHLORIDE 3 G: 900 INJECTION INTRAVENOUS at 03:17

## 2021-08-31 RX ADMIN — TIOTROPIUM BROMIDE INHALATION SPRAY 5 MCG: 3.12 SPRAY, METERED RESPIRATORY (INHALATION) at 08:31

## 2021-08-31 RX ADMIN — SODIUM CHLORIDE 3 G: 900 INJECTION INTRAVENOUS at 22:02

## 2021-08-31 RX ADMIN — CETIRIZINE HYDROCHLORIDE 10 MG: 10 TABLET, FILM COATED ORAL at 23:00

## 2021-08-31 RX ADMIN — METHYLPREDNISOLONE SODIUM SUCCINATE 125 MG: 125 INJECTION, POWDER, FOR SOLUTION INTRAMUSCULAR; INTRAVENOUS at 01:49

## 2021-08-31 RX ADMIN — IPRATROPIUM BROMIDE AND ALBUTEROL SULFATE 3 ML: .5; 2.5 SOLUTION RESPIRATORY (INHALATION) at 20:05

## 2021-08-31 RX ADMIN — NICOTINE 14 MG: 14 PATCH TRANSDERMAL at 05:58

## 2021-08-31 RX ADMIN — IBUPROFEN 600 MG: 600 TABLET ORAL at 13:02

## 2021-08-31 RX ADMIN — GUAIFENESIN 1200 MG: 600 TABLET, EXTENDED RELEASE ORAL at 14:04

## 2021-08-31 RX ADMIN — INSULIN HUMAN 2 UNITS: 100 INJECTION, SOLUTION PARENTERAL at 10:27

## 2021-08-31 RX ADMIN — ALBUTEROL SULFATE 10 PUFF: 90 AEROSOL, METERED RESPIRATORY (INHALATION) at 01:49

## 2021-08-31 RX ADMIN — LEVOTHYROXINE SODIUM 112 MCG: 0.11 TABLET ORAL at 05:59

## 2021-08-31 RX ADMIN — OMEPRAZOLE 20 MG: 20 CAPSULE, DELAYED RELEASE ORAL at 23:00

## 2021-08-31 RX ADMIN — VANCOMYCIN HYDROCHLORIDE 750 MG: 500 INJECTION, POWDER, LYOPHILIZED, FOR SOLUTION INTRAVENOUS at 16:29

## 2021-08-31 RX ADMIN — INSULIN LISPRO 10 UNITS: 100 INJECTION, SOLUTION INTRAVENOUS; SUBCUTANEOUS at 22:58

## 2021-08-31 RX ADMIN — ASPIRIN 81 MG: 81 TABLET, COATED ORAL at 05:58

## 2021-08-31 RX ADMIN — VANCOMYCIN HYDROCHLORIDE 2750 MG: 500 INJECTION, POWDER, LYOPHILIZED, FOR SOLUTION INTRAVENOUS at 03:54

## 2021-08-31 RX ADMIN — DOCUSATE SODIUM 50 MG AND SENNOSIDES 8.6 MG 2 TABLET: 8.6; 5 TABLET, FILM COATED ORAL at 05:58

## 2021-08-31 RX ADMIN — ACETAMINOPHEN 650 MG: 325 TABLET, FILM COATED ORAL at 22:02

## 2021-08-31 RX ADMIN — RALOXIFENE 60 MG: 60 TABLET ORAL at 05:59

## 2021-08-31 RX ADMIN — INSULIN HUMAN 9 UNITS: 100 INJECTION, SOLUTION PARENTERAL at 21:44

## 2021-08-31 RX ADMIN — DOCUSATE SODIUM 50 MG AND SENNOSIDES 8.6 MG 2 TABLET: 8.6; 5 TABLET, FILM COATED ORAL at 18:01

## 2021-08-31 RX ADMIN — IBUPROFEN 600 MG: 600 TABLET ORAL at 18:57

## 2021-08-31 RX ADMIN — SODIUM CHLORIDE 3 G: 900 INJECTION INTRAVENOUS at 15:45

## 2021-08-31 RX ADMIN — FLUOXETINE 20 MG: 20 CAPSULE ORAL at 05:58

## 2021-08-31 ASSESSMENT — COGNITIVE AND FUNCTIONAL STATUS - GENERAL
SUGGESTED CMS G CODE MODIFIER MOBILITY: CH
DAILY ACTIVITIY SCORE: 24
MOBILITY SCORE: 24
SUGGESTED CMS G CODE MODIFIER DAILY ACTIVITY: CH

## 2021-08-31 ASSESSMENT — LIFESTYLE VARIABLES
HAVE YOU EVER FELT YOU SHOULD CUT DOWN ON YOUR DRINKING: NO
TOTAL SCORE: 0
AVERAGE NUMBER OF DAYS PER WEEK YOU HAVE A DRINK CONTAINING ALCOHOL: 7
EVER FELT BAD OR GUILTY ABOUT YOUR DRINKING: NO
ON A TYPICAL DAY WHEN YOU DRINK ALCOHOL HOW MANY DRINKS DO YOU HAVE: 2
TOTAL SCORE: 0
ALCOHOL_USE: YES
HAVE PEOPLE ANNOYED YOU BY CRITICIZING YOUR DRINKING: NO
DOES PATIENT WANT TO STOP DRINKING: NO
EVER HAD A DRINK FIRST THING IN THE MORNING TO STEADY YOUR NERVES TO GET RID OF A HANGOVER: NO
HOW MANY TIMES IN THE PAST YEAR HAVE YOU HAD 5 OR MORE DRINKS IN A DAY: 0
PACK_YEARS: 40
TOTAL SCORE: 0
CONSUMPTION TOTAL: POSITIVE

## 2021-08-31 ASSESSMENT — COPD QUESTIONNAIRES
DURING THE PAST 4 WEEKS HOW MUCH DID YOU FEEL SHORT OF BREATH: SOME OF THE TIME
DO YOU EVER COUGH UP ANY MUCUS OR PHLEGM?: YES, A FEW DAYS A WEEK OR MONTH
HAVE YOU SMOKED AT LEAST 100 CIGARETTES IN YOUR ENTIRE LIFE: YES
COPD SCREENING SCORE: 5
DO YOU EVER COUGH UP ANY MUCUS OR PHLEGM?: NO/ONLY WITH OCCASIONAL COLDS OR INFECTIONS
HAVE YOU SMOKED AT LEAST 100 CIGARETTES IN YOUR ENTIRE LIFE: YES
DURING THE PAST 4 WEEKS HOW MUCH DID YOU FEEL SHORT OF BREATH: MOST  OR ALL OF THE TIME
COPD SCREENING SCORE: 9
IN THE PAST 12 MONTHS DO YOU DO LESS THAN YOU USED TO BECAUSE OF YOUR BREATHING PROBLEMS: STRONGLY AGREE

## 2021-08-31 ASSESSMENT — ENCOUNTER SYMPTOMS
NAUSEA: 0
FEVER: 1
BRUISES/BLEEDS EASILY: 0
HEARTBURN: 0
INSOMNIA: 0
NECK PAIN: 0
HEADACHES: 0
CONSTIPATION: 0
COUGH: 1
WHEEZING: 0
VOMITING: 0
DEPRESSION: 0
SHORTNESS OF BREATH: 1
SORE THROAT: 0
PALPITATIONS: 0
BLURRED VISION: 0
DOUBLE VISION: 0
CHILLS: 1
ABDOMINAL PAIN: 0
MYALGIAS: 1
BLOOD IN STOOL: 0
LOSS OF CONSCIOUSNESS: 0
WEAKNESS: 1
BACK PAIN: 0
FOCAL WEAKNESS: 0
DIARRHEA: 0

## 2021-08-31 ASSESSMENT — PATIENT HEALTH QUESTIONNAIRE - PHQ9
1. LITTLE INTEREST OR PLEASURE IN DOING THINGS: NOT AT ALL
SUM OF ALL RESPONSES TO PHQ9 QUESTIONS 1 AND 2: 0
2. FEELING DOWN, DEPRESSED, IRRITABLE, OR HOPELESS: NOT AT ALL

## 2021-08-31 ASSESSMENT — PAIN DESCRIPTION - PAIN TYPE
TYPE: ACUTE PAIN

## 2021-08-31 NOTE — PROGRESS NOTES
Pharmacy Vancomycin Kinetics Note for 8/31/2021     70 y.o. female on Vancomycin day # 1    Vancomycin Indication (AUC Dosing): S. aureus pneumonia    Provider specified end date: 09/05/21    Active Antibiotics (From admission, onward)    Ordered     Ordering Provider       Tue Aug 31, 2021  3:56 AM    08/31/21 0356  vancomycin (VANCOCIN) 1,500 mg in  mL IVPB  (vancomycin (VANCOCIN) IV (LD + Maintenance))  EVERY 24 HOURS         CHIARA Baird Aug 31, 2021  3:51 AM    08/31/21 0351  MD Alert...Vancomycin per Pharmacy  PHARMACY TO DOSE        Question:  Indication(s) for vancomycin?  Answer:  Pneumonia    Emre Vazquez D.O.       [place] Aug 31, 2021  2:21 AM    08/31/21 0221  ampicillin/sulbactam (UNASYN) 3 g in  mL IVPB  (CAP (Simple Sepsis) )  EVERY 6 HOURS         Emre Vazquez D.O.       [place] Aug 31, 2021  2:08 AM    08/31/21 0208  vancomycin (VANCOCIN) 2,750 mg in  mL IVPB  (vancomycin (VANCOCIN) IV (LD + Maintenance))  ONCE         Duke Mooney M.D.          Dosing Weight: 105 kg (231 lb 7.7 oz)      Admission History: Admitted on 8/31/2021 for Sepsis (HCC) [A41.9]  Pertinent history: Patient admitted for generalized body aches and SOB. Empirically started on antibiotics for PNA.    Allergies:     Bactrim [sulfamethoxazole w-trimethoprim] and Percocet [oxycodone-acetaminophen]     Pertinent cultures to date:     Results     Procedure Component Value Units Date/Time    MRSA By PCR (Amp) [[id number]]     Order Status: Sent Specimen: Respirate from Nares     COV-2, FLU A/B, AND RSV BY PCR (2-4 HOURS CEPHEID): Collect NP swab in VTM [[id number]] Collected: 08/31/21 0132    Order Status: Completed Specimen: Respirate from Nasopharyngeal Updated: 08/31/21 0312     Influenza virus A RNA Negative     Influenza virus B, PCR Negative     RSV, PCR Negative     SARS-CoV-2 by PCR NotDetected     Comment: PATIENTS: Important information regarding your results and instructions  "can  be found at [email or web address]   \"After your  Covid-19 Test\"    [facility] providers: PLEASE REFER TO DE-ESCALATION AND RETESTING PROTOCOL  on [email or web address]    **The [employer] GeneXpert Xpress SARS-CoV-2 RT-PCR Test has been made  available for use under the Emergency Use Authorization (EUA) only.          SARS-CoV-2 Source NP Swab    Narrative:      Have you been in close contact with a person who is suspected  or known to be positive for COVID-19 within the last 30 days  (e.g. last seen that person < 30 days ago)->Unknown    BLOOD CULTURE [[id number]] Collected: 21    Order Status: Completed Specimen: Blood from Peripheral Updated: 21    Narrative:      Per Hospital Policy: Only change Specimen Src: to \"Line\" if  specified by physician order.    BLOOD CULTURE [[id number]] Collected: 21    Order Status: Completed Specimen: Blood from Peripheral Updated: 21    Narrative:      Per Hospital Policy: Only change Specimen Src: to \"Line\" if  specified by physician order.    Urinalysis [[id number]]     Order Status: Sent Specimen: Urine, Clean Catch     Culture Respiratory W/ Grm Stn [[id number]]     Order Status: Completed Specimen: Respirate from Sputum           Labs:     Estimated Creatinine Clearance: 59.2 mL/min (by C-G formula based on SCr of 1.12 mg/dL).  Recent Labs     21  2317   WBC 17.3*   NEUTSPOLYS 76.90*   BANDSSTABS 12.00*     Recent Labs     21  231   BUN 27*   CREATININE 1.12   ALBUMIN 3.3     No intake or output data in the 24 hours ending 21 0356   /56   Pulse 85   Temp 35.9 °C (96.6 °F) (Temporal)   Resp 20   Ht 1.727 m (5' 8\")   Wt 105 kg (231 lb 4.2 oz)   SpO2 97%  Temp (24hrs), Av.9 °C (96.6 °F), Min:35.9 °C (96.6 °F), Max:35.9 °C (96.6 °F)      List concerns for Vancomycin clearance:     BUN/Scr ratio greater than 20:1;Obesity    Pharmacokinetics:     AUC kinetics:   Ke (hr ^-1): 0.0535 " hr^-1  Half life: 12.96 hr  Clearance: 3.258  Estimated TDD: 1629  Estimated Dose: 1018  Estimated interval: 15      Trough kinetics:   No results for input(s): VANCOTROUGH, VANCOPEAK, VANCORANDOM in the last 72 hours.    A/P:     -  Vancomycin dose: Start vancomycin 1500 mg q24H    -  Next vancomycin level(s): ~3 days or sooner pending renal function (will need to be ordered)     -  Comments: New start vancomycin for possible pneumonia. MRSA PCR pending. Concerns for renal function listed above. Repeat BMP in AM. Start vancomycin 1500 mg q24H for predicted AUC of 460 mg hr/L.    Sharlene Chaudhry, PharmD

## 2021-08-31 NOTE — PROGRESS NOTES
This is 70-year-old female with a past medical history significant for ongoing nicotine dependence, COPD on 4 L of oxygen, type 1 diabetes mellitus diagnosed when she was 41 years ago GERD, hypertension, dyslipidemia,obesity presented to the ER on 8/31/2021 with a complaint of shortness of breath associated with generalized body weakness and fever as high as 102.    Reports worsening of shortness of breath on exertion associated with cough and sometimes sputum production.  Work up in ER, WBC 17.3 with bands of 12%, BUN/creatinine 27/1.12, lactic acid 1.7, procalcitonin 44, UA showing positive nitrate, large leukocyte esterase urine WBC 5200; chest x-ray showing interstitial opacity, additional consolidative opacity in the right mid lung zone.    During the stay in the hospital, blood cultures x2, UA, urine culture obtained; pending; patient was started on broad-spectrum antibiotics incluidng  vancomycin plus Unasyn.  MRSA nasal swab ordered, pending if negative, can consider discontinuing vancomycin    On physical examination, patient is noted to have decreased breath sounds likely secondary to COPD exacerbation, will continue heart with regular, PT treatment, Mucinex, steroid.    She continues to smoke up a pack of cigarette, extensive counseling was provided regarding the adverse effect of smoking cigarettes, she stated understanding.    #1 sepsis secondary to respiratory versus urinary source  #2 COPD exacerbation  #3 acute on chronic hypoxic respiratory failure  #4 nicotine dependence  #5 type 1 diabetes mellitus  #6 hypothyroidism  #7 obesity  #8 leukocytosis with bandemia  #9 macrocytosis

## 2021-08-31 NOTE — ASSESSMENT & PLAN NOTE
Continue Synthroid 112 mcg p.o. daily  Avoid calcium supplementation within 1 hour of administration

## 2021-08-31 NOTE — ED NOTES
Received report from PRISCILLA Robbins. Assumed care of patient. Pt sitting up in chair in room. Call light in reach. No needs at this time. Hospital bed ordered for pt.

## 2021-08-31 NOTE — ED TRIAGE NOTES
Michelle Espinoza  70 y.o.  female  Chief Complaint   Patient presents with   • Shortness of Breath     c/o increasing shortness of breath started last night. patient on 4-5 liters oxygen 24/7 for COPD.    • Generalized Body Aches     x 1 week   • Fever     started last night. + chills. afebrile in triage

## 2021-08-31 NOTE — ASSESSMENT & PLAN NOTE
Continues to use tobacco however in agreement with tobacco cessation protocol.  Maintain SPO2 greater than 88%  Continue steroid taper and continue inhalers  Use 4 L at rest at home, she is on by her baseline  Chest x-ray reveals middle right lobar pneumonia  Influenza A/B, RSV and Covid negative  Improving and continue inhalers

## 2021-08-31 NOTE — ASSESSMENT & PLAN NOTE
This is Sepsis Present on admission  SIRS criteria identified on my evaluation include: Tachycardia, with heart rate greater than 90 BPM, Tachypnea, with respirations greater than 20 per minute and Leukocytosis, with WBC greater than 12,000  Source is Respiratory  Sepsis protocol initiated  IV antibiotics as appropriate for source of sepsis  While organ dysfunction may be noted elsewhere in this problem list or in the chart, degree of organ dysfunction does not meet CMS criteria for severe sepsis  Sputum culture/blood culture x2, mycoplasma PCR, urine/strep urinary antigen ordered and pending  IV antibiotics initiated in ED and will continue onward.  Procalcitonin ordered and pending.  Significant for middle lobar pneumonia, influenza A/B, RSV and Covid negative.  MRSA probe ordered and pending.    Empiric vanc stopped  MIMI is negative  On ciprofloxacin

## 2021-08-31 NOTE — ED PROVIDER NOTES
ER Provider Note     Scribed for Duke Mooney M.D. by Iban Lutz. 8/31/2021, 1:08 AM.    Primary Care Provider: Blaire Perez M.D.  Means of Arrival: Walk-in   History obtained from: Patient  History limited by: None     CHIEF COMPLAINT  Chief Complaint   Patient presents with    Shortness of Breath     c/o increasing shortness of breath started last night. patient on 4-5 liters oxygen 24/7 for COPD.     Generalized Body Aches     x 1 week    Fever     started last night. + chills. afebrile in triage       HPI  Michelle Espinoza is a 70 y.o. female who presents to the Emergency Department for evaluation of worsening shortness of breath onset last night. She is on 4-5 L oxygen at baseline due to COPD. At home her oxygen saturation was in the 80s while still on her 4L oxygen. She gets severly short of breath when she has to get up and walk around, and typically requires 5L to ambulate. This morning she took two breathing treatments without alleviation of her symptoms. Additionally she has had generalized body aches, and reports a fever last night. No chest pain. She is vaccinated against COVID-19.    REVIEW OF SYSTEMS  See HPI for further details. All other systems are negative.     PAST MEDICAL HISTORY   has a past medical history of Anesthesia, Arthritis, ASTHMA, Cancer ([facility]), Dental disorder, Diabetes, Diabetic neuropathy ([facility]), EMPHYSEMA, Encounter for long-term (current) use of insulin ([facility]) (4/23/2014), Fitting and adjustment of insulin pump (4/23/2014), Heart burn, osteopenia (4/23/2014), Pain, Psychiatric problem, Sleep apnea, and Unspecified disorder of thyroid.    SURGICAL HISTORY   has a past surgical history that includes mastectomy (1987, 2001); mastectomy (1992); breast reconstruction (2001); and hip arthroplasty total (9/9/2013).    SOCIAL HISTORY  Social History     Tobacco Use    Smoking status: Current Every Day Smoker     Packs/day: 1.00     Years: 40.00     Pack years: 40.00     Types:  "Cigarettes    Smokeless tobacco: Never Used   Vaping Use    Vaping Use: Never used   Substance Use Topics    Alcohol use: Yes     Comment: 2 per day    Drug use: No      Social History     Substance and Sexual Activity   Drug Use No       FAMILY HISTORY  Family History   Problem Relation Age of Onset    Lung Disease Other        CURRENT MEDICATIONS  Home Medications       Reviewed by Stephanie Loving PharmD (Pharmacist) on 08/31/21 at 0254  Med List Status: Partial     Medication Last Dose Status   albuterol (PROVENTIL) 2.5mg/3ml NEBU  Active   aspirin EC (ECOTRIN) 81 MG TBEC  Active   B Complex Cap  Active   beclomethasone (QVAR) 80 MCG/ACT inhaler  Active   captopril (CAPOTEN) 12.5 MG Tab  Active   celecoxib (CELEBREX) 200 MG CAPS  Active   cetirizine (ZYRTEC) 10 MG Tab  Active   Cyanocobalamin (VITAMIN B 12 PO)  Active   esomeprazole (NEXIUM) 20 MG capsule  Active   fluoxetine (PROZAC) 20 MG CAPS  Active   Insulin Aspart, w/Niacinamide, (FIASP) 100 UNIT/ML Solution  Active   ipratropium-albuterol (DUONEB) 0.5-2.5 (3) MG/3ML nebulizer solution  Active   levothyroxine (SYNTHROID) 112 MCG Tab  Active   lovastatin (MEVACOR) 20 MG TABS  Active   montelukast (SINGULAIR) 10 MG Tab  Active   Multiple Vitamins-Minerals (MULTIVITAMIN ADULT PO)  Active   raloxifene (EVISTA) 60 MG TABS  Active   tiotropium (SPIRIVA HANDIHALER) 18 MCG CAPS  Active   vitamin D (CHOLECALCIFEROL) 1000 UNIT Tab  Active                    ALLERGIES  Allergies   Allergen Reactions    Bactrim [Sulfamethoxazole W-Trimethoprim]      hives    Percocet [Oxycodone-Acetaminophen] Vomiting       PHYSICAL EXAM  VITAL SIGNS: /56   Pulse 85   Temp 35.9 °C (96.6 °F) (Temporal)   Resp 20   Ht 1.727 m (5' 8\")   Wt 105 kg (231 lb 4.2 oz)   SpO2 97%   BMI 35.16 kg/m²      Constitutional: Alert in no apparent distress.  HENT: No signs of trauma, Bilateral external ears normal, Nose normal.   Eyes: Pupils are equal and reactive, Conjunctiva normal, " Non-icteric.   Neck: Normal range of motion, No tenderness, Supple, No stridor.   Lymphatic: No lymphadenopathy noted.   Cardiovascular: Regular rate and rhythm, no palpable thrill  Thorax & Lungs: Diminished lung sounds throughout, Mild respiratory distress  Abdomen: Bowel sounds normal, Soft, No tenderness, No masses, No pulsatile masses. No peritoneal signs.  Skin: Warm, Dry, No erythema, No rash.   Back: No bony tenderness, No CVA tenderness.   Extremities: Intact distal pulses, No edema, No tenderness, No cyanosis.  Musculoskeletal: Good range of motion in all major joints. No tenderness to palpation or major deformities noted.   Neurologic: Alert , Normal motor function, Normal sensory function, No focal deficits noted.   Psychiatric: Affect normal, Judgment normal, Mood normal.     DIAGNOSTIC STUDIES / PROCEDURES    EKG Interpretation:  Interpreted by me    12 Lead EKG interpreted by me to show:  Normal sinus rhythm  Rate 92  Axis: Normal  Intervals: Normal  No T wave changes from prior other than flipped T wave in AVL  Normal ST segments, no elevation or depression  My impression of this EKG: Does not indicate ischemia or arrhythmia at this time.     LABS  Labs Reviewed   CBC WITH DIFFERENTIAL - Abnormal; Notable for the following components:       Result Value    WBC 17.3 (*)     Platelet Count 138 (*)     Neutrophils-Polys 76.90 (*)     Lymphocytes 7.70 (*)     Neutrophils (Absolute) 15.38 (*)     All other components within normal limits   COMP METABOLIC PANEL - Abnormal; Notable for the following components:    Glucose 129 (*)     Bun 27 (*)     Alkaline Phosphatase 134 (*)     All other components within normal limits   ESTIMATED GFR - Abnormal; Notable for the following components:    GFR If  58 (*)     GFR If Non  48 (*)     All other components within normal limits   DIFFERENTIAL MANUAL - Abnormal; Notable for the following components:    Bands-Stabs 12.00 (*)     All  "other components within normal limits   PROCALCITONIN - Abnormal; Notable for the following components:    Procalcitonin 44.02 (*)     All other components within normal limits    Narrative:     If not done within the last 4 hours  Indicate which anticoagulants the patient is on:->UNKNOWN   PERIPHERAL SMEAR REVIEW   PLATELET ESTIMATE   MORPHOLOGY   COV-2, FLU A/B, AND RSV BY PCR    Narrative:     Have you been in close contact with a person who is suspected  or known to be positive for COVID-19 within the last 30 days  (e.g. last seen that person < 30 days ago)->Unknown   BLOOD CULTURE    Narrative:     Per Hospital Policy: Only change Specimen Src: to \"Line\" if  specified by physician order.   BLOOD CULTURE    Narrative:     Per Hospital Policy: Only change Specimen Src: to \"Line\" if  specified by physician order.   LACTIC ACID   PROTHROMBIN TIME    Narrative:     If not done within the last 4 hours  Indicate which anticoagulants the patient is on:->UNKNOWN   MAGNESIUM    Narrative:     If not done within the last 4 hours  Indicate which anticoagulants the patient is on:->UNKNOWN   CULTURE RESPIRATORY W/ GRM STN   LACTIC ACID   URINALYSIS   MRSA BY PCR (AMP)   CBC WITH DIFFERENTIAL   BASIC METABOLIC PANEL     All labs reviewed by me.    RADIOLOGY  DX-CHEST-PORTABLE (1 VIEW)   Final Result      Interstitial opacity compatible with underlying scarring      Some additional consolidative opacity right midlung zone could indicate bacterial or Covid pneumonia      Right apex subpleural opacity most likely from pleural-parenchymal scarring. Malignant nodule or infection cannot be excluded         The radiologist's interpretation of all radiological studies have been reviewed by me.    COURSE & MEDICAL DECISION MAKING  Pertinent Labs & Imaging studies reviewed. (See chart for details)    This is a 70 y.o. female that presents with what appears to be a COPD exacerbation versus pneumonia versus worsening respiratory status " and potential failure.  We'll evaluate her for Covid as well as get a chest x-ray and basic labs..     1:08 AM - Patient seen and examined at bedside. Ordered EKG, DX-chest, COV-2, Flu A/B, and RSV by PCR, CBC with differential, and CMP.     1:30 AM - Nurse informed me that patient remained above 92% while ambulating on 4L. Patient treated with albuterol and Solu Medrol.    2:06 AM - Patient treated with Zosyn and Vancocin.     2:15 AM - I discussed the patient's case and the above findings with Dr. Vazquez (Hospitalist) who agrees to evaluate the patient for hospitalization.    Patient does appear to be septic and does have a what appears to be pneumonia.  The patient does have a left shift with leukocytosis.  Patient does have a mild GFR decrease.  We'll give the patient antibiotics.  We'll give the patient small amount of fluids.  Admit the patient to hospitalist in guarded condition.    The total critical care time on this patient is 40 minutes, resuscitating patient, speaking with admitting physician, and deciphering test results. This 40 minutes is exclusive of separately billable procedures.      DISPOSITION:  Patient will be hospitalized by Dr. Vazquez in guarded condition.    FINAL IMPRESSION  1. Sepsis, due to unspecified organism, unspecified whether acute organ dysfunction present (HCC)    2. Pneumonia of right middle lobe due to infectious organism          Iban OROPEZA (Nel), am scribing for, and in the presence of, Duke Mooney M.D..    Electronically signed by: Iban Lutz (Nel), 8/31/2021    Duke OROPEZA M.D. personally performed the services described in this documentation, as scribed by Iban Lutz in my presence, and it is both accurate and complete.     The note accurately reflects work and decisions made by me.  Duke Mooney M.D.  8/31/2021  5:37 AM

## 2021-08-31 NOTE — H&P
Hospital Medicine History & Physical Note    Date of Service  8/31/2021    Primary Care Physician  Blaire Perez M.D.    Consultants  None    Code Status  Full Code    Chief Complaint  Chief Complaint   Patient presents with   • Shortness of Breath     c/o increasing shortness of breath started last night. patient on 4-5 liters oxygen 24/7 for COPD.    • Generalized Body Aches     x 1 week   • Fever     started last night. + chills. afebrile in triage       History of Presenting Illness  Michelle Espinoza is a 70 y.o. female who presented 8/31/2021 with complaints of fevers, chills, myalgias, shortness of breath and cough without sputum production.  Patient complains of myalgias for the past week and fevers and chills starting last night.  She has a history of type 1 diabetes on insulin and well-controlled.  She is compliant with her home medication regiment.  She does admit to still smoking cigarettes and in agreement with tobacco cessation/nicotine patch.  Patient admits to shortness of breath and on 4 L nasal cannula at rest at home.  She is dry cough without sputum production and presented to ED for the above complaints.  She otherwise denies the following: Substernal chest pain,  hemoptysis, anosmia, ageusia, unintentional weight loss, hemoptysis, nausea, vomiting, hematemesis, constipation, diarrhea, melena, hematochezia, dysuria, hematuria, incoordination, vertigo, syncope, visual changes.  Patient does state that she is received Covid vaccination x2.    Vital signs at time of evaluation were as follows: 96.6, 117, 24, 151/78, 86% room air.  Patient was subsequently placed on 6 L nasal cannula with adequate SPO2.    Chest x-ray was performed and reveals consolidative opacity of right middle lobe could indicate bacterial or Covid pneumonia.    CMP was performed and reveals mild elevation in ALP at 134 otherwise relatively unremarkable.  CBC was performed and reveals leukocytosis neutrophilic predominant  with a WBC count of 17.39 and mild thrombocytopenia of 138 otherwise unremarkable.  12% bands noted and procalcitonin was ordered and resulted at level of 44.02.  Influenza A/B, RSV and Covid were tested for negative.    Patient started on IV antibiotics, fluid resuscitation in ED.  She is in agreement with inpatient hospitalization for community-acquired pneumonia.  She agrees to full CODE STATUS at time of evaluation and is alert and oriented x4.  She will be optimized in ED and subsequently transferred to medical [facility] floor for further optimization of medical management.    I discussed the plan of care with patient.    Review of Systems  Review of Systems   Constitutional: Positive for chills, fever and malaise/fatigue.   HENT: Negative for congestion and sore throat.    Eyes: Negative for blurred vision and double vision.   Respiratory: Positive for cough and shortness of breath. Negative for wheezing.    Cardiovascular: Negative for chest pain and palpitations.   Gastrointestinal: Negative for abdominal pain, blood in stool, constipation, diarrhea, heartburn, melena, nausea and vomiting.   Genitourinary: Negative for dysuria and frequency.   Musculoskeletal: Positive for myalgias. Negative for back pain and neck pain.   Skin: Negative for itching and rash.   Neurological: Positive for weakness. Negative for focal weakness, loss of consciousness and headaches.   Endo/Heme/Allergies: Negative for environmental allergies. Does not bruise/bleed easily.   Psychiatric/Behavioral: Negative for depression. The patient does not have insomnia.        Past Medical History   has a past medical history of Anesthesia, Arthritis, ASTHMA, Cancer ([facility]), Dental disorder, Diabetes, Diabetic neuropathy ([facility]), EMPHYSEMA, Encounter for long-term (current) use of insulin ([facility]) (4/23/2014), Fitting and adjustment of insulin pump (4/23/2014), Heart burn, osteopenia (4/23/2014), Pain, Psychiatric problem, Sleep apnea, and Unspecified  disorder of thyroid.    Surgical History   has a past surgical history that includes mastectomy (, ); mastectomy (); breast reconstruction (); and hip arthroplasty total (2013).     Family History  family history includes Lung Disease in an other family member.   Family history reviewed with patient. There is no family history that is pertinent to the chief complaint.     Social History   reports that she has been smoking cigarettes. She has a 40.00 pack-year smoking history. She has never used smokeless tobacco. She reports current alcohol use. She reports that she does not use drugs.    Allergies  Allergies   Allergen Reactions   • Bactrim [Sulfamethoxazole W-Trimethoprim]      hives   • Percocet [Oxycodone-Acetaminophen] Vomiting       Medications  Prior to Admission Medications   Prescriptions Last Dose Informant Patient Reported? Taking?   B Complex Cap  Patient Yes No   Sig: Take 1 Cap by mouth every day.   Cyanocobalamin (VITAMIN B 12 PO)   Yes No   Sig: Take 1,000 mcg by mouth.   Insulin Aspart, w/Niacinamide, (FIASP) 100 UNIT/ML Solution   Yes No   Sig: Inject 15 Units as instructed 3 times a day before meals.   Multiple Vitamins-Minerals (MULTIVITAMIN ADULT PO)   Yes No   Sig: Take  by mouth.   albuterol (PROVENTIL) 2.5mg/3ml NEBU  Patient Yes No   Si.5 mg by Nebulization route every four hours as needed for Shortness of Breath. Dose unknown   aspirin EC (ECOTRIN) 81 MG TBEC  Patient Yes No   Sig: Take 81 mg by mouth every day.   beclomethasone (QVAR) 80 MCG/ACT inhaler  Patient Yes No   Sig: Inhale 2 Puffs by mouth 2 times a day. Indications: Chronic Bronchitis with Asthma   captopril (CAPOTEN) 12.5 MG Tab   Yes No   celecoxib (CELEBREX) 200 MG CAPS  Patient Yes No   Sig: Take 200 mg by mouth every day.   cetirizine (ZYRTEC) 10 MG Tab  Patient Yes No   Sig: Take 10 mg by mouth 2 times a day.   esomeprazole (NEXIUM) 20 MG capsule   Yes No   Sig: Take 40 mg by mouth 2 times a day.    fluoxetine (PROZAC) 20 MG CAPS  Patient Yes No   Sig: Take 20 mg by mouth every day.   ipratropium-albuterol (DUONEB) 0.5-2.5 (3) MG/3ML nebulizer solution   No No   Sig: 3 mL by Nebulization route every 6 hours as needed for Shortness of Breath.   levothyroxine (SYNTHROID) 112 MCG Tab   No No   Sig: Take 1 tablet by mouth Every morning on an empty stomach.   lovastatin (MEVACOR) 20 MG TABS  Patient Yes No   Sig: Take 20 mg by mouth every evening.   montelukast (SINGULAIR) 10 MG Tab   Yes No   Sig: Take 10 mg by mouth every day.   raloxifene (EVISTA) 60 MG TABS  Patient Yes No   Sig: Take 60 mg by mouth every day.   tiotropium (SPIRIVA HANDIHALER) 18 MCG CAPS  Patient Yes No   Sig: Inhale 18 mcg by mouth every day.   vitamin D (CHOLECALCIFEROL) 1000 UNIT Tab   Yes No   Sig: Take 2,000 Units by mouth every day.      Facility-Administered Medications: None       Physical Exam  Temp:  [35.9 °C (96.6 °F)] 35.9 °C (96.6 °F)  Pulse:  [] 85  Resp:  [20-24] 20  BP: (115-151)/(56-78) 115/56  SpO2:  [86 %-97 %] 97 %    Physical Exam  Constitutional:       General: She is not in acute distress.     Appearance: Normal appearance. She is obese. She is not ill-appearing, toxic-appearing or diaphoretic.   HENT:      Head: Normocephalic and atraumatic.      Mouth/Throat:      Mouth: Mucous membranes are moist.      Pharynx: Oropharynx is clear. No posterior oropharyngeal erythema.      Comments: Upper and lower dentures appreciated  Eyes:      General: No scleral icterus.     Extraocular Movements: Extraocular movements intact.      Conjunctiva/sclera: Conjunctivae normal.      Pupils: Pupils are equal, round, and reactive to light.   Neck:      Vascular: No carotid bruit.   Cardiovascular:      Rate and Rhythm: Normal rate and regular rhythm.      Pulses: Normal pulses.      Heart sounds: Normal heart sounds. No murmur heard.   No friction rub. No gallop.    Pulmonary:      Effort: Pulmonary effort is normal.      Breath  sounds: Normal breath sounds. No wheezing, rhonchi or rales.   Abdominal:      General: Abdomen is flat. Bowel sounds are normal. There is no distension.      Palpations: There is no mass.      Tenderness: There is no abdominal tenderness. There is no rebound.   Musculoskeletal:         General: No swelling. Normal range of motion.      Cervical back: Normal range of motion.      Right lower leg: No edema.      Left lower leg: No edema.   Lymphadenopathy:      Cervical: No cervical adenopathy.   Skin:     General: Skin is warm and dry.      Capillary Refill: Capillary refill takes less than 2 seconds.      Findings: No erythema or rash.   Neurological:      General: No focal deficit present.      Mental Status: She is alert and oriented to person, place, and time. Mental status is at baseline.      Cranial Nerves: No cranial nerve deficit.      Sensory: No sensory deficit.      Motor: No weakness.   Psychiatric:         Mood and Affect: Mood normal.         Behavior: Behavior normal.         Laboratory:  Recent Labs     08/30/21  2317   WBC 17.3*   RBC 4.53   HEMOGLOBIN 14.8   HEMATOCRIT 44.0   MCV 97.1   MCH 32.7   MCHC 33.6   RDW 47.3   PLATELETCT 138*   MPV 11.0     Recent Labs     08/30/21  2317   SODIUM 139   POTASSIUM 4.0   CHLORIDE 103   CO2 25   GLUCOSE 129*   BUN 27*   CREATININE 1.12   CALCIUM 9.0     Recent Labs     08/30/21  2317   ALTSGPT 22   ASTSGOT 25   ALKPHOSPHAT 134*   TBILIRUBIN 0.4   GLUCOSE 129*     Recent Labs     08/31/21  0230   INR 1.11     No results for input(s): NTPROBNP in the last 72 hours.      No results for input(s): TROPONINT in the last 72 hours.    Imaging:  DX-CHEST-PORTABLE (1 VIEW)   Final Result      Interstitial opacity compatible with underlying scarring      Some additional consolidative opacity right midlung zone could indicate bacterial or Covid pneumonia      Right apex subpleural opacity most likely from pleural-parenchymal scarring. Malignant nodule or infection  cannot be excluded      I have reviewed and interpreted patient's twelve-lead EKG and appreciate sinus rhythm without any acute ischemic changes.  QTc within normal limits.    I have reviewed and interpreted chest x-ray and do appreciate right middle lobe opacity.  Images unable to be uploaded currently.      Assessment/Plan:  I anticipate this patient will require at least two midnights for appropriate medical management, necessitating inpatient admission.    * Sepsis (HCC)- (present on admission)  Assessment & Plan  This is Sepsis Present on admission  SIRS criteria identified on my evaluation include: Tachycardia, with heart rate greater than 90 BPM, Tachypnea, with respirations greater than 20 per minute and Leukocytosis, with WBC greater than 12,000  Source is Respiratory  Sepsis protocol initiated  IV antibiotics as appropriate for source of sepsis  While organ dysfunction may be noted elsewhere in this problem list or in the chart, degree of organ dysfunction does not meet CMS criteria for severe sepsis  Sputum culture/blood culture x2, mycoplasma PCR, urine/strep urinary antigen ordered and pending  IV antibiotics initiated in ED and will continue onward.  Procalcitonin ordered and pending.  Significant for middle lobar pneumonia, influenza A/B, RSV and Covid negative.  MRSA probe ordered and pending.  De-escalate vancomycin if negative.      Acute on chronic respiratory failure with hypoxia (HCC)- (present on admission)  Assessment & Plan  Continues to use tobacco however in agreement with tobacco cessation protocol.  Maintain SPO2 greater than 88%  Use 4 L at rest at home  Pep therapy  Chest x-ray reveals middle right lobar pneumonia  Influenza A/B, RSV and Covid negative    COPD (chronic obstructive pulmonary disease) ([facility])- (present on admission)  Assessment & Plan  Continue supplemental oxygen to maintain SPO2 greater than 88%  Not in acute exacerbation  Duo nebs as needed  Continue home medication  regimen     Thrombocytopenia (HCC)- (present on admission)  Assessment & Plan  Mild, no indication for platelet transfusion at this time  Repeat CBC in a.m.    Tobacco abuse- (present on admission)  Assessment & Plan  6 minutes tobacco cessation counseling commenced at bedside.  Patient explained and educated the risks of smoking.  Education provided, in agreement with alternatives such as nicotine patch, Nicorette gum.  Strong recommendation for outpatient follow-up with smoking cessation program at discharge as patient still has urges to smoke however in agreement with cessation.    Obesity- (present on admission)  Assessment & Plan  Strong recommendation for outpatient follow-up with PCP for lifestyle modification including diet and exercise regimen of at least 30 minutes brisk cardiovascular exercise 3-5 times per week.    Hypothyroid- (present on admission)  Assessment & Plan  No indication for TSH/free T4 draw at this time  Continue Synthroid 112 mcg p.o. daily  Avoid calcium supplementation within 1 hour of administration    Uncontrolled type 1 diabetes mellitus (HCC)- (present on admission)  Assessment & Plan  Hemoglobin A1c of 6.1 as of July 2021  POC glucose QA [facility]  Low SSI  Follow-up outpatient PCP/endocrinologist after discharge  Cardiac/diabetic diet      VTE prophylaxis: enoxaparin ppx

## 2021-08-31 NOTE — ED NOTES
Patient ambulated back and forth in room 4 times with 4l nc and oxygen saturation of 92% at the lowest  will be notified.

## 2021-08-31 NOTE — RESPIRATORY CARE
"  COPD EDUCATION by COPD CLINICAL EDUCATOR  (Phone: [phone])  8/31/2021 at 2:18 PM by Elham Ritchie, RRT     Patient seen by Respiratory Education team to complete Block 1 of a 2 part series. Reference material about the program was given to patient along with our contact information.  Part #1 includes: What is COPD (how the lungs work), common treatments for COPD, the difference between \"rescue\" medications and \"daily\" medications, bronchial hygiene  with an explanation of COPD Action Plan. We reviewed their appropriate medication techniques -including a demonstration. We discussed the correct way to care for and clean respiratory equipment and when applicable, Advance Care Planning was discussed.  Question and answer session followed.     Smoking Cessation Intervention and education completed, 5 minutes spent on smoking cessation education with patient.  Provided smoking cessation packet with \"Tips to Quit\" and brochure for \"Free Smoking Cessation Classes\".     COPD Screen  COPD Risk Screening  Do you have a history of COPD?: Yes  Do you have a Pulmonologist?: No  COPD Population Screener  During the past 4 weeks, how much did you feel short of breath?: Most  or all of the time  Do you ever cough up any mucus or phlegm?: Yes, a few days a week or month  In the past 12 months, you do less than you used to because of your breathing problems: Strongly agree  Have you smoked at least 100 cigarettes in your entire life?: Yes  How old are you?: 60+  COPD Screening Score: 9  COPD Coordinator Recommended: Yes    COPD Assessment  COPD Clinical Specialists ONLY  COPD Education Initiated: Yes--Full Intervention  COPD Education Session 1: Yes  DME Company: patient states she bought her own equipment  DME Equipment Type: nebulizer and oxygen (4L)  Physician Name: Blaire Simon [facility]  Pulmonologist Name: patient interested in COPD remote monitoring and follow up  Durable Power of : No  Advance Directive: " "No  Discussion with others: Yes  Referrals Initiated: Yes  Pulmonary Rehab: Yes  Smoking Cessation: Yes  $ Smoking Cessation 3-10 Minutes: Symptomatic  Smoking Pack Years: 40  Home Health Care:  (requested)  [facility] Community Outreach: Yes  [facility]: Yes  [facility]: N/A  Private In-Home Care Agency: N/A  Is this a COPD exacerbation patient?: Yes  $ Demo/Eval of SVN's, MDI's and Aerosols: Yes  (OP) Pulmonary Function Testing:  (not on file)    Meds to Beds  Would the patient like to opt in for Bedside Medication Delivery at Discharge?: Yes, interested     MY COPD ACTION PLAN     It is recommended that patients and physicians /healthcare providers complete this action plan together. This plan should be discussed at each physician visit and updated as needed.    The green, yellow and red zones show groups of symptoms of COPD. This list of symptoms is not comprehensive, and you may experience other symptoms. In the \"Actions\" column, your healthcare provider has recommended actions for you to take based on your symptoms.    Patient Name: Michelle Espinoza   YOB: 1950   Last Updated on: 8/31/2021  2:17 PM   Green Zone:  I am doing well today Actions   •  Usual activitiy and exercise level •  Take daily medications   •  Usual amounts of cough and phlegm/mucus •  Use oxygen as prescribed   •  Sleep well at night •  Continue regular exercise/diet plan   •  Appetite is good •  At all times avoid cigarette smoke, inhaled irritants     Daily Medications (these medications are taken every day):   Beclomethasone Dipropionate (QVAR)  Tiotropium Bromide Monohydrate (Spiriva) 2 Puffs  1 Puff Twice daily  Once daily        Yellow Zone:  I am having a bad day or a COPD flare Actions   •  More breathless than usual •  Continue daily medications   •  I have less energy for my daily activities •  Use quick relief inhaler as ordered   •  Increased or thicker phlegm/mucus •  Use oxygen as prescribed   •  " "Using quick relief inhaler/nebulizer more often •  Get plenty of rest   •  Swelling of ankles more than usual •  Use pursed lip breathing   •  More coughing than usual •  At all times avoid cigarette smoke, inhaled irritants   •  I feel like I have a \"chest cold\"   •  Poor sleep and my symptoms woke me up   •  My appetite is not good   •  My medicine is not helping    •  Call provider immediately if symptoms don’t improve     Continue daily medications, add rescue medications:   Albuterol  Albuterol/Ipratropium (Combivent, Duoneb) 2.5mg via nebulizer  3mL via nebulizer Every 4 hours PRN  Every 4 hours PRN       Medications to be used during a flare up, (as Discussed with Provider):           Additional Information:  Use nebulizer for rescue medication!!! Use spacer with inhaler    Red Zone:  I need urgent medical care Actions   •  Severe shortness of breath even at rest •  Call 911 or seek medical care immediately   •  Not able to do any activity because of breathing    •  Fever or shaking chills    •  Feeling confused or very drowsy     •  Chest pains    •  Coughing up blood              "

## 2021-08-31 NOTE — PROGRESS NOTES
Pharmacy Vancomycin Kinetics Note for 8/31/2021     70 y.o. female on Vancomycin day # 1     Vancomycin Indication (AUC Dosing): Sepsis    Provider specified end date: 09/05/21    Active Antibiotics (From admission, onward)    Ordered     Ordering Provider       Tue Aug 31, 2021  3:22 PM    08/31/21 1522  vancomycin (VANCOCIN) 750 mg in  mL IVPB  (vancomycin (VANCOCIN) IV (LD + Maintenance))  EVERY 12 HOURS         Connie Jarquin M.D.       Tue Aug 31, 2021  3:51 AM    08/31/21 0351  MD Alert...Vancomycin per Pharmacy  PHARMACY TO DOSE        Question:  Indication(s) for vancomycin?  Answer:  Pneumonia    Emre Vazquez D.O.       Tue Aug 31, 2021  2:21 AM    08/31/21 0221  ampicillin/sulbactam (UNASYN) 3 g in  mL IVPB  (CAP (Simple Sepsis) )  EVERY 6 HOURS         Emre Vazquez D.O.          Dosing Weight: 105 kg (231 lb 7.7 oz)      Admission History: Admitted on 8/31/2021 for Sepsis ([facility]) [A41.9]  Pertinent history: Patient admitted for generalized body aches and SOB. Empirically started on antibiotics for PNA.    Allergies:     Bactrim [sulfamethoxazole w-trimethoprim] and Percocet [oxycodone-acetaminophen]     Pertinent cultures to date:     Results     Procedure Component Value Units Date/Time    URINE CULTURE-EXISTING-LESS THAN 48 HOURS [[id number]]     Order Status: Sent Specimen: Urine, Clean Catch     Urinalysis [[id number]]  (Abnormal) Collected: 08/31/21 0604    Order Status: Completed Specimen: Urine, Clean Catch Updated: 08/31/21 0626     Color DK Yellow     Character Turbid     Specific Gravity 1.020     Ph 5.5     Glucose Negative mg/dL      Ketones Trace mg/dL      Protein 100 mg/dL      Bilirubin Negative     Urobilinogen, Urine 1.0     Nitrite Positive     Leukocyte Esterase Large     Occult Blood Moderate     Micro Urine Req Microscopic    Narrative:      If not done within the last 24 hours    MRSA By PCR (Amp) [[id number]]     Order Status: Sent Specimen: Respirate from Nares   "   COV-2, FLU A/B, AND RSV BY PCR (2-4 HOURS CEPHEID): Collect NP swab in [facility] [[id number]] Collected: 08/31/21 0132    Order Status: Completed Specimen: Respirate from Nasopharyngeal Updated: 08/31/21 0312     Influenza virus A RNA Negative     Influenza virus B, PCR Negative     RSV, PCR Negative     SARS-CoV-2 by PCR NotDetected     Comment: PATIENTS: Important information regarding your results and instructions can  be found at [email or web address]   \"After your  Covid-19 Test\"    [facility] providers: PLEASE REFER TO DE-ESCALATION AND RETESTING PROTOCOL  on [email or web address]    **The [employer] GeneXpert Xpress SARS-CoV-2 RT-PCR Test has been made  available for use under the Emergency Use Authorization (EUA) only.          SARS-CoV-2 Source NP Swab    Narrative:      Have you been in close contact with a person who is suspected  or known to be positive for COVID-19 within the last 30 days  (e.g. last seen that person < 30 days ago)->Unknown    BLOOD CULTURE [[id number]] Collected: 08/31/21 0230    Order Status: Completed Specimen: Blood from Peripheral Updated: 08/31/21 0258    Narrative:      Per Hospital Policy: Only change Specimen Src: to \"Line\" if  specified by physician order.    BLOOD CULTURE [[id number]] Collected: 08/31/21 0220    Order Status: Completed Specimen: Blood from Peripheral Updated: 08/31/21 0258    Narrative:      Per Hospital Policy: Only change Specimen Src: to \"Line\" if  specified by physician order.    Culture Respiratory W/ [facility] [[id number]]     Order Status: Completed Specimen: Respirate from Sputum           Labs:     Estimated Creatinine Clearance: 62 mL/min (by C-G formula based on SCr of 1.07 mg/dL).  Recent Labs     08/30/21 2317 08/31/21 0836   WBC 17.3* 15.2*   NEUTSPOLYS 76.90* 91.00*   BANDSSTABS 12.00*  --      Recent Labs     08/30/21 2317 08/31/21 0836   BUN 27* 29*   CREATININE 1.12 1.07   ALBUMIN 3.3  --        Intake/Output Summary (Last 24 " "hours) at 2021 1522  Last data filed at 2021 1400  Gross per 24 hour   Intake 970 ml   Output --   Net 970 ml      /70   Pulse 76   Temp 37.1 °C (98.7 °F) (Temporal)   Resp 20   Ht 1.727 m (5' 8\")   Wt 105 kg (231 lb 4.2 oz)   SpO2 96%  Temp (24hrs), Av.6 °C (97.8 °F), Min:35.9 °C (96.6 °F), Max:37.1 °C (98.7 °F)      List concerns for Vancomycin clearance:     Hypermetabolic State (SIRS);Obesity;BUN/Scr ratio greater than 20:1;Age    Pharmacokinetics:     AUC kinetics:   Ke (hr ^-1): 0.0559 hr^-1  Half life: 12.4 hr  Clearance: 3.815  Estimated TDD: 1907.5  Estimated Dose: 1145  Estimated interval: 14.4    A/P:     -  Vancomycin dose: 750 mg IV Q12H (adjusted from 1500 mg daily)    -  Next vancomycin level(s):    -  @ 0630   -Vt  @ 1430     -  Predicted vancomycin AUC from initial AUC test calculator: 524 mg·hr/L    New start vancomycin for possible pneumonia. MRSA PCR pending (ordered but not collected. Confirmed with floor RN to collect). Concerns for renal function listed above. Repeat BMP in AM.     Yvonne Reeves, PharmD, PGY2 Emergency Medicine Pharmacy Resident    " 25-Jun-2018 23:31

## 2021-08-31 NOTE — PROGRESS NOTES
Attending Hospitalist today is Dr. De La Paz.  Please call this physician for orders, updates, or questions.

## 2021-08-31 NOTE — ASSESSMENT & PLAN NOTE
Strong recommendation for outpatient follow-up with PCP for lifestyle modification including diet and exercise regimen of at least 30 minutes brisk cardiovascular exercise 3-5 times per week.

## 2021-08-31 NOTE — ASSESSMENT & PLAN NOTE
6 minutes tobacco cessation counseling commenced at bedside.  Patient explained and educated the risks of smoking.  Education provided, in agreement with alternatives such as nicotine patch, Nicorette gum.  Strong recommendation for outpatient follow-up with smoking cessation program at discharge as patient still has urges to smoke however in agreement with cessation.

## 2021-08-31 NOTE — ASSESSMENT & PLAN NOTE
Continue supplemental oxygen to maintain SPO2 greater than 88%  Now back at baseline  Completed steroid taper  Duo nebs as needed  Encouraged the patient to quit smoking

## 2021-09-01 PROBLEM — G47.33 OSA (OBSTRUCTIVE SLEEP APNEA): Status: ACTIVE | Noted: 2021-09-01

## 2021-09-01 PROBLEM — R78.81 BACTEREMIA: Status: ACTIVE | Noted: 2021-09-01

## 2021-09-01 LAB
ANION GAP SERPL CALC-SCNC: 13 MMOL/L (ref 7–16)
BACTERIA #/AREA URNS HPF: ABNORMAL /HPF
BASOPHILS # BLD AUTO: 0.2 % (ref 0–1.8)
BASOPHILS # BLD: 0.03 K/UL (ref 0–0.12)
BUN SERPL-MCNC: 27 MG/DL (ref 8–22)
CALCIUM SERPL-MCNC: 9.6 MG/DL (ref 8.5–10.5)
CHLORIDE SERPL-SCNC: 100 MMOL/L (ref 96–112)
CO2 SERPL-SCNC: 21 MMOL/L (ref 20–33)
CREAT SERPL-MCNC: 0.94 MG/DL (ref 0.5–1.4)
EOSINOPHIL # BLD AUTO: 0 K/UL (ref 0–0.51)
EOSINOPHIL NFR BLD: 0 % (ref 0–6.9)
EPI CELLS #/AREA URNS HPF: ABNORMAL /HPF
ERYTHROCYTE [DISTWIDTH] IN BLOOD BY AUTOMATED COUNT: 47.6 FL (ref 35.9–50)
GLUCOSE BLD-MCNC: 327 MG/DL (ref 65–99)
GLUCOSE BLD-MCNC: 349 MG/DL (ref 65–99)
GLUCOSE BLD-MCNC: 427 MG/DL (ref 65–99)
GLUCOSE SERPL-MCNC: 358 MG/DL (ref 65–99)
HCT VFR BLD AUTO: 40.8 % (ref 37–47)
HGB BLD-MCNC: 13.6 G/DL (ref 12–16)
HYALINE CASTS #/AREA URNS LPF: ABNORMAL /LPF
IMM GRANULOCYTES # BLD AUTO: 0.3 K/UL (ref 0–0.11)
IMM GRANULOCYTES NFR BLD AUTO: 2.2 % (ref 0–0.9)
LYMPHOCYTES # BLD AUTO: 0.51 K/UL (ref 1–4.8)
LYMPHOCYTES NFR BLD: 3.7 % (ref 22–41)
MCH RBC QN AUTO: 32.5 PG (ref 27–33)
MCHC RBC AUTO-ENTMCNC: 33.3 G/DL (ref 33.6–35)
MCV RBC AUTO: 97.6 FL (ref 81.4–97.8)
MONOCYTES # BLD AUTO: 0.56 K/UL (ref 0–0.85)
MONOCYTES NFR BLD AUTO: 4.1 % (ref 0–13.4)
NEUTROPHILS # BLD AUTO: 12.42 K/UL (ref 2–7.15)
NEUTROPHILS NFR BLD: 89.8 % (ref 44–72)
NRBC # BLD AUTO: 0 K/UL
NRBC BLD-RTO: 0 /100 WBC
PLATELET # BLD AUTO: 126 K/UL (ref 164–446)
PMV BLD AUTO: 11.6 FL (ref 9–12.9)
POTASSIUM SERPL-SCNC: 4.4 MMOL/L (ref 3.6–5.5)
PROCALCITONIN SERPL-MCNC: 22.05 NG/ML
RBC # BLD AUTO: 4.18 M/UL (ref 4.2–5.4)
RBC # URNS HPF: ABNORMAL /HPF
SODIUM SERPL-SCNC: 134 MMOL/L (ref 135–145)
VANCOMYCIN PEAK SERPL-MCNC: 23 UG/ML (ref 20–40)
WBC # BLD AUTO: 13.8 K/UL (ref 4.8–10.8)
WBC #/AREA URNS HPF: ABNORMAL /HPF

## 2021-09-01 PROCEDURE — A9270 NON-COVERED ITEM OR SERVICE: HCPCS | Performed by: HOSPITALIST

## 2021-09-01 PROCEDURE — 700102 HCHG RX REV CODE 250 W/ 637 OVERRIDE(OP): Performed by: STUDENT IN AN ORGANIZED HEALTH CARE EDUCATION/TRAINING PROGRAM

## 2021-09-01 PROCEDURE — 700105 HCHG RX REV CODE 258: Performed by: HOSPITALIST

## 2021-09-01 PROCEDURE — 700101 HCHG RX REV CODE 250: Performed by: HOSPITALIST

## 2021-09-01 PROCEDURE — 94640 AIRWAY INHALATION TREATMENT: CPT

## 2021-09-01 PROCEDURE — 99233 SBSQ HOSP IP/OBS HIGH 50: CPT | Performed by: HOSPITALIST

## 2021-09-01 PROCEDURE — 770006 HCHG ROOM/CARE - MED/SURG/GYN SEMI*

## 2021-09-01 PROCEDURE — 80202 ASSAY OF VANCOMYCIN: CPT

## 2021-09-01 PROCEDURE — 36415 COLL VENOUS BLD VENIPUNCTURE: CPT

## 2021-09-01 PROCEDURE — 700102 HCHG RX REV CODE 250 W/ 637 OVERRIDE(OP): Performed by: HOSPITALIST

## 2021-09-01 PROCEDURE — 700111 HCHG RX REV CODE 636 W/ 250 OVERRIDE (IP): Performed by: HOSPITALIST

## 2021-09-01 PROCEDURE — 94660 CPAP INITIATION&MGMT: CPT

## 2021-09-01 PROCEDURE — 87186 SC STD MICRODIL/AGAR DIL: CPT

## 2021-09-01 PROCEDURE — 80048 BASIC METABOLIC PNL TOTAL CA: CPT

## 2021-09-01 PROCEDURE — 87040 BLOOD CULTURE FOR BACTERIA: CPT

## 2021-09-01 PROCEDURE — A9270 NON-COVERED ITEM OR SERVICE: HCPCS | Performed by: STUDENT IN AN ORGANIZED HEALTH CARE EDUCATION/TRAINING PROGRAM

## 2021-09-01 PROCEDURE — 82962 GLUCOSE BLOOD TEST: CPT | Mod: 91

## 2021-09-01 PROCEDURE — 84145 PROCALCITONIN (PCT): CPT

## 2021-09-01 PROCEDURE — 700105 HCHG RX REV CODE 258: Performed by: STUDENT IN AN ORGANIZED HEALTH CARE EDUCATION/TRAINING PROGRAM

## 2021-09-01 PROCEDURE — 85025 COMPLETE CBC W/AUTO DIFF WBC: CPT

## 2021-09-01 PROCEDURE — 94669 MECHANICAL CHEST WALL OSCILL: CPT

## 2021-09-01 PROCEDURE — 700111 HCHG RX REV CODE 636 W/ 250 OVERRIDE (IP): Performed by: STUDENT IN AN ORGANIZED HEALTH CARE EDUCATION/TRAINING PROGRAM

## 2021-09-01 PROCEDURE — 87077 CULTURE AEROBIC IDENTIFY: CPT

## 2021-09-01 RX ORDER — IPRATROPIUM BROMIDE AND ALBUTEROL SULFATE 2.5; .5 MG/3ML; MG/3ML
3 SOLUTION RESPIRATORY (INHALATION)
Status: DISCONTINUED | OUTPATIENT
Start: 2021-09-01 | End: 2021-09-02

## 2021-09-01 RX ORDER — CAPTOPRIL 12.5 MG/1
12.5 TABLET ORAL DAILY
Status: DISCONTINUED | OUTPATIENT
Start: 2021-09-01 | End: 2021-09-08 | Stop reason: HOSPADM

## 2021-09-01 RX ORDER — ALUMINA, MAGNESIA, AND SIMETHICONE 2400; 2400; 240 MG/30ML; MG/30ML; MG/30ML
10 SUSPENSION ORAL 4 TIMES DAILY PRN
Status: DISCONTINUED | OUTPATIENT
Start: 2021-09-01 | End: 2021-09-08 | Stop reason: HOSPADM

## 2021-09-01 RX ADMIN — IPRATROPIUM BROMIDE AND ALBUTEROL SULFATE 3 ML: .5; 2.5 SOLUTION RESPIRATORY (INHALATION) at 10:23

## 2021-09-01 RX ADMIN — PREDNISONE 40 MG: 20 TABLET ORAL at 06:57

## 2021-09-01 RX ADMIN — FLUTICASONE PROPIONATE 220 MCG: 110 AEROSOL, METERED RESPIRATORY (INHALATION) at 06:58

## 2021-09-01 RX ADMIN — IBUPROFEN 600 MG: 600 TABLET ORAL at 08:51

## 2021-09-01 RX ADMIN — LOVASTATIN 20 MG: 20 TABLET ORAL at 21:12

## 2021-09-01 RX ADMIN — INSULIN HUMAN 9 UNITS: 100 INJECTION, SOLUTION PARENTERAL at 12:21

## 2021-09-01 RX ADMIN — ACETAMINOPHEN 650 MG: 325 TABLET, FILM COATED ORAL at 13:57

## 2021-09-01 RX ADMIN — IPRATROPIUM BROMIDE AND ALBUTEROL SULFATE 3 ML: .5; 2.5 SOLUTION RESPIRATORY (INHALATION) at 06:56

## 2021-09-01 RX ADMIN — SODIUM CHLORIDE 3 G: 900 INJECTION INTRAVENOUS at 21:12

## 2021-09-01 RX ADMIN — FLUTICASONE PROPIONATE 220 MCG: 110 AEROSOL, METERED RESPIRATORY (INHALATION) at 21:12

## 2021-09-01 RX ADMIN — ENOXAPARIN SODIUM 40 MG: 40 INJECTION SUBCUTANEOUS at 06:42

## 2021-09-01 RX ADMIN — INSULIN HUMAN 6 UNITS: 100 INJECTION, SOLUTION PARENTERAL at 21:20

## 2021-09-01 RX ADMIN — DOCUSATE SODIUM 50 MG AND SENNOSIDES 8.6 MG 2 TABLET: 8.6; 5 TABLET, FILM COATED ORAL at 17:47

## 2021-09-01 RX ADMIN — IPRATROPIUM BROMIDE AND ALBUTEROL SULFATE 3 ML: .5; 2.5 SOLUTION RESPIRATORY (INHALATION) at 15:50

## 2021-09-01 RX ADMIN — SODIUM CHLORIDE 3 G: 900 INJECTION INTRAVENOUS at 03:36

## 2021-09-01 RX ADMIN — CAPTOPRIL 12.5 MG: 12.5 TABLET ORAL at 13:19

## 2021-09-01 RX ADMIN — DOCUSATE SODIUM 50 MG AND SENNOSIDES 8.6 MG 2 TABLET: 8.6; 5 TABLET, FILM COATED ORAL at 06:37

## 2021-09-01 RX ADMIN — MONTELUKAST 10 MG: 10 TABLET, FILM COATED ORAL at 06:36

## 2021-09-01 RX ADMIN — LEVOTHYROXINE SODIUM 112 MCG: 0.11 TABLET ORAL at 06:39

## 2021-09-01 RX ADMIN — VANCOMYCIN HYDROCHLORIDE 750 MG: 500 INJECTION, POWDER, LYOPHILIZED, FOR SOLUTION INTRAVENOUS at 04:42

## 2021-09-01 RX ADMIN — IBUPROFEN 600 MG: 600 TABLET ORAL at 15:39

## 2021-09-01 RX ADMIN — INSULIN HUMAN 8 UNITS: 100 INJECTION, SOLUTION PARENTERAL at 17:12

## 2021-09-01 RX ADMIN — NICOTINE 14 MG: 14 PATCH TRANSDERMAL at 06:41

## 2021-09-01 RX ADMIN — ASPIRIN 81 MG: 81 TABLET, COATED ORAL at 06:37

## 2021-09-01 RX ADMIN — SODIUM CHLORIDE 3 G: 900 INJECTION INTRAVENOUS at 08:45

## 2021-09-01 RX ADMIN — SODIUM CHLORIDE 3 G: 900 INJECTION INTRAVENOUS at 15:27

## 2021-09-01 RX ADMIN — IPRATROPIUM BROMIDE AND ALBUTEROL SULFATE 3 ML: .5; 2.5 SOLUTION RESPIRATORY (INHALATION) at 21:06

## 2021-09-01 RX ADMIN — RALOXIFENE 60 MG: 60 TABLET ORAL at 06:38

## 2021-09-01 RX ADMIN — TIOTROPIUM BROMIDE INHALATION SPRAY 5 MCG: 3.12 SPRAY, METERED RESPIRATORY (INHALATION) at 06:58

## 2021-09-01 RX ADMIN — GUAIFENESIN 1200 MG: 600 TABLET, EXTENDED RELEASE ORAL at 06:35

## 2021-09-01 RX ADMIN — GUAIFENESIN 1200 MG: 600 TABLET, EXTENDED RELEASE ORAL at 17:47

## 2021-09-01 RX ADMIN — FLUOXETINE 20 MG: 20 CAPSULE ORAL at 06:35

## 2021-09-01 ASSESSMENT — ENCOUNTER SYMPTOMS
BLURRED VISION: 0
MYALGIAS: 0
DEPRESSION: 0
NAUSEA: 0
CHILLS: 0
CONSTITUTIONAL NEGATIVE: 1
DIZZINESS: 0
HEADACHES: 0
BRUISES/BLEEDS EASILY: 0
ABDOMINAL PAIN: 0
SHORTNESS OF BREATH: 1
PALPITATIONS: 0
FEVER: 0
WEIGHT LOSS: 0
PSYCHIATRIC NEGATIVE: 1
SPUTUM PRODUCTION: 1
DIAPHORESIS: 0
SORE THROAT: 0
VOMITING: 0
CARDIOVASCULAR NEGATIVE: 1
NEUROLOGICAL NEGATIVE: 1
MUSCULOSKELETAL NEGATIVE: 1
GASTROINTESTINAL NEGATIVE: 1
COUGH: 1
WEAKNESS: 0
EYES NEGATIVE: 1
FOCAL WEAKNESS: 0

## 2021-09-01 ASSESSMENT — LIFESTYLE VARIABLES: SUBSTANCE_ABUSE: 0

## 2021-09-01 ASSESSMENT — PAIN DESCRIPTION - PAIN TYPE
TYPE: ACUTE PAIN

## 2021-09-01 NOTE — CARE PLAN
Problem: Knowledge Deficit - Standard  Goal: Patient and family/care givers will demonstrate understanding of plan of care, disease process/condition, diagnostic tests and medications  Outcome: Progressing     Problem: Risk for Infection - COPD  Goal: Patient will remain free from signs and symptoms of infection  Outcome: Progressing     Problem: Self Care  Goal: Patient will have the ability to perform ADLs independently or with assistance (bathe, groom, dress, toilet and feed)  Outcome: Progressing     The patient is Stable - Low risk of patient condition declining or worsening

## 2021-09-01 NOTE — PROGRESS NOTES
Bedside report received.  Assessment complete.  A&O x 4. Patient calls appropriately.  Patient mobilizes independently. Bed alarm n/a.   Denies N&V. Tolerating cardiac, diabetic diet.  Patient denies SOB.  Patient reports taking insulin based on calories rather than FSBG. She reports taking 6-9 units per meals and smaller doses with snacks.   Review plan with of care with patient. Call light and personal belongings with in reach. Hourly rounding in place. All needs met at this time.

## 2021-09-01 NOTE — PROGRESS NOTES
Received report from ER.  Pt arrived to T412 bed 2 via gurney  No immediate distress.  A&Ox4  Reports headache, medicated per MAR  Denies n/v  Drains none  Patient is up independently.  Oriented to room, educated on welcome packet, white board, TV, call light, call before fall, plan of care, oral care expectations, ambulation goals, and up to chair for all meals goal.  Call light and personal belongings within reach.  Fall precautions and hourly rounding in place

## 2021-09-01 NOTE — PROGRESS NOTES
2 RN skin check.  Patient has scattered bruises and scabs on arms and legs due to pet dogs. All other skin intact.

## 2021-09-01 NOTE — PROGRESS NOTES
FSBG of 508, following a previous value of 447. Dr. Cummins notified. Discussed previously adjusted long acting insulin dose. Received new order for one time insulin dose.

## 2021-09-01 NOTE — PROGRESS NOTES
Hospital Medicine Daily Progress Note    Date of Service  9/1/2021    Chief Complaint  Michelle Espinoza is a 70 y.o. female admitted 8/31/2021 with shortness of breath, fevers and myalgias    Hospital Course  Patient is a 70 year old female with history of nicotine dependence, insulin dependent diabetes, copd with chronic hypoxic respiratory failure on 4L (5 with exertion at baseline), MICHAELA and unspecified psychiatric problem.  She presented to the ER with fevers and increasing sob.  She was found to have right sided pneumonia and was started on broad spectrum antibiotics.  She was also thought to have copd exacerbation and was started on steroids for this.    Interval Problem Update  She is axox4, states he cough and sob are improving, but not yet back to her baseline.  She is concerned about her blood sugars as she has worked hard to get her A1c low, understands that the steroids, infection and hospital food are affecting them.  Discussed with nursing and we will resume her home regimen, SSI after meals with additional 1 unit of insulin per 5 carbs in addition to the standard scale.  She also requested that her zyrtec be restarted.  ROS otherwise negative.  Denies cp, no myalgias today.     I have personally seen and examined the patient at bedside. I discussed the plan of care with patient, nursing and case management.    Consultants/Specialty    Code Status  Full Code    Disposition  Patient is not medically cleared.   Anticipate discharge to home  I have placed the appropriate orders for post-discharge needs.    Review of Systems  Review of Systems   Constitutional: Negative.  Negative for chills, diaphoresis, fever, malaise/fatigue and weight loss.   HENT: Negative.  Negative for sore throat.    Eyes: Negative.  Negative for blurred vision.   Respiratory: Positive for cough, sputum production and shortness of breath.    Cardiovascular: Negative.  Negative for chest pain, palpitations and leg swelling.    Gastrointestinal: Negative.  Negative for abdominal pain, nausea and vomiting.   Genitourinary: Negative.  Negative for dysuria.   Musculoskeletal: Negative.  Negative for myalgias.   Skin: Negative.  Negative for itching and rash.   Neurological: Negative.  Negative for dizziness, focal weakness, weakness and headaches.   Endo/Heme/Allergies: Negative.  Does not bruise/bleed easily.   Psychiatric/Behavioral: Negative.  Negative for depression, substance abuse and suicidal ideas.   All other systems reviewed and are negative.       Physical Exam  Temp:  [36.4 °C (97.5 °F)-37.1 °C (98.7 °F)] 36.7 °C (98.1 °F)  Pulse:  [69-84] 69  Resp:  [16-23] 18  BP: (125-158)/(63-86) 158/86  SpO2:  [93 %-98 %] 93 %    Physical Exam  Vitals and nursing note reviewed. Exam conducted with a chaperone present.   Constitutional:       General: She is not in acute distress.     Appearance: Normal appearance. She is not diaphoretic.   HENT:      Head: Normocephalic.      Nose: Nose normal.      Mouth/Throat:      Mouth: Mucous membranes are moist.   Eyes:      Pupils: Pupils are equal, round, and reactive to light.   Cardiovascular:      Rate and Rhythm: Normal rate and regular rhythm.      Pulses: Normal pulses.      Heart sounds: Normal heart sounds. No murmur heard.     Pulmonary:      Effort: Pulmonary effort is normal. No respiratory distress.      Breath sounds: Rhonchi present. No wheezing.      Comments: Diminished throughout  Abdominal:      General: Abdomen is flat. Bowel sounds are normal. There is no distension.      Palpations: Abdomen is soft. There is no mass.      Tenderness: There is no abdominal tenderness. There is no guarding.   Musculoskeletal:         General: No swelling, tenderness or deformity. Normal range of motion.   Skin:     General: Skin is warm and dry.      Capillary Refill: Capillary refill takes less than 2 seconds.      Coloration: Skin is not jaundiced or pale.      Findings: No bruising or  erythema.   Neurological:      General: No focal deficit present.      Mental Status: She is alert and oriented to person, place, and time.      Cranial Nerves: No cranial nerve deficit.   Psychiatric:         Mood and Affect: Mood normal.         Behavior: Behavior normal.         Fluids    Intake/Output Summary (Last 24 hours) at 9/1/2021 0808  Last data filed at 9/1/2021 0406  Gross per 24 hour   Intake 1140 ml   Output --   Net 1140 ml       Laboratory  Recent Labs     08/30/21 2317 08/31/21  0836   WBC 17.3* 15.2*   RBC 4.53 4.17*   HEMOGLOBIN 14.8 13.7   HEMATOCRIT 44.0 41.6   MCV 97.1 99.8*   MCH 32.7 32.9   MCHC 33.6 32.9*   RDW 47.3 48.9   PLATELETCT 138* 124*   MPV 11.0 11.3     Recent Labs     08/30/21 2317 08/31/21  0836   SODIUM 139 136   POTASSIUM 4.0 4.0   CHLORIDE 103 101   CO2 25 23   GLUCOSE 129* 224*   BUN 27* 29*   CREATININE 1.12 1.07   CALCIUM 9.0 8.8     Recent Labs     08/31/21  0230   INR 1.11               Imaging  DX-CHEST-PORTABLE (1 VIEW)   Final Result      Interstitial opacity compatible with underlying scarring      Some additional consolidative opacity right midlung zone could indicate bacterial or Covid pneumonia      Right apex subpleural opacity most likely from pleural-parenchymal scarring. Malignant nodule or infection cannot be excluded           Assessment/Plan  * Sepsis (HCC)- (present on admission)  Assessment & Plan  This is Sepsis Present on admission  SIRS criteria identified on my evaluation include: Tachycardia, with heart rate greater than 90 BPM, Tachypnea, with respirations greater than 20 per minute and Leukocytosis, with WBC greater than 12,000  Source is Respiratory  Sepsis protocol initiated  IV antibiotics as appropriate for source of sepsis  While organ dysfunction may be noted elsewhere in this problem list or in the chart, degree of organ dysfunction does not meet CMS criteria for severe sepsis  Sputum culture/blood culture x2, mycoplasma PCR, urine/strep  urinary antigen ordered and pending  IV antibiotics initiated in ED and will continue onward.  Procalcitonin ordered and pending.  Significant for middle lobar pneumonia, influenza A/B, RSV and Covid negative.  MRSA probe ordered and pending.  De-escalate vancomycin if negative.      Thrombocytopenia (HCC)- (present on admission)  Assessment & Plan  Mild, no indication for platelet transfusion at this time  Repeat CBC in a.m.    COPD (chronic obstructive pulmonary disease) (HCC)- (present on admission)  Assessment & Plan  Continue supplemental oxygen to maintain SPO2 greater than 88%  Not in acute exacerbation  Duo nebs as needed  Continue home medication regimen     Acute on chronic respiratory failure with hypoxia (HCC)- (present on admission)  Assessment & Plan  Continues to use tobacco however in agreement with tobacco cessation protocol.  Maintain SPO2 greater than 88%  Use 4 L at rest at home  Pep therapy  Chest x-ray reveals middle right lobar pneumonia  Influenza A/B, RSV and Covid negative    Tobacco abuse- (present on admission)  Assessment & Plan  6 minutes tobacco cessation counseling commenced at bedside.  Patient explained and educated the risks of smoking.  Education provided, in agreement with alternatives such as nicotine patch, Nicorette gum.  Strong recommendation for outpatient follow-up with smoking cessation program at discharge as patient still has urges to smoke however in agreement with cessation.    Obesity- (present on admission)  Assessment & Plan  Strong recommendation for outpatient follow-up with PCP for lifestyle modification including diet and exercise regimen of at least 30 minutes brisk cardiovascular exercise 3-5 times per week.    Hypothyroid- (present on admission)  Assessment & Plan  No indication for TSH/free T4 draw at this time  Continue Synthroid 112 mcg p.o. daily  Avoid calcium supplementation within 1 hour of administration    Uncontrolled type 1 diabetes mellitus (HCC)-  (present on admission)  Assessment & Plan  Hemoglobin A1c of 6.1 as of July 2021  POC glucose QA CHS  Low SSI  Follow-up outpatient PCP/endocrinologist after discharge  Cardiac/diabetic diet         VTE prophylaxis: enoxaparin ppx    I have performed a physical exam and reviewed and updated ROS and Plan today (9/1/2021). In review of yesterday's note (8/31/2021), there are no changes except as documented above.

## 2021-09-02 ENCOUNTER — APPOINTMENT (OUTPATIENT)
Dept: RADIOLOGY | Facility: MEDICAL CENTER | Age: 71
DRG: 871 | End: 2021-09-02
Attending: STUDENT IN AN ORGANIZED HEALTH CARE EDUCATION/TRAINING PROGRAM
Payer: MEDICARE

## 2021-09-02 LAB
BACTERIA BLD CULT: ABNORMAL
BASOPHILS # BLD AUTO: 0.4 % (ref 0–1.8)
BASOPHILS # BLD: 0.04 K/UL (ref 0–0.12)
EOSINOPHIL # BLD AUTO: 0.01 K/UL (ref 0–0.51)
EOSINOPHIL NFR BLD: 0.1 % (ref 0–6.9)
ERYTHROCYTE [DISTWIDTH] IN BLOOD BY AUTOMATED COUNT: 47.9 FL (ref 35.9–50)
GLUCOSE BLD-MCNC: 117 MG/DL (ref 65–99)
GLUCOSE BLD-MCNC: 149 MG/DL (ref 65–99)
GLUCOSE BLD-MCNC: 166 MG/DL (ref 65–99)
GLUCOSE BLD-MCNC: 203 MG/DL (ref 65–99)
GLUCOSE BLD-MCNC: 369 MG/DL (ref 65–99)
HCT VFR BLD AUTO: 37.7 % (ref 37–47)
HGB BLD-MCNC: 12.3 G/DL (ref 12–16)
IMM GRANULOCYTES # BLD AUTO: 0.04 K/UL (ref 0–0.11)
IMM GRANULOCYTES NFR BLD AUTO: 0.4 % (ref 0–0.9)
LYMPHOCYTES # BLD AUTO: 0.9 K/UL (ref 1–4.8)
LYMPHOCYTES NFR BLD: 8.8 % (ref 22–41)
MCH RBC QN AUTO: 31.9 PG (ref 27–33)
MCHC RBC AUTO-ENTMCNC: 32.6 G/DL (ref 33.6–35)
MCV RBC AUTO: 97.9 FL (ref 81.4–97.8)
MONOCYTES # BLD AUTO: 0.52 K/UL (ref 0–0.85)
MONOCYTES NFR BLD AUTO: 5.1 % (ref 0–13.4)
NEUTROPHILS # BLD AUTO: 8.72 K/UL (ref 2–7.15)
NEUTROPHILS NFR BLD: 85.2 % (ref 44–72)
NRBC # BLD AUTO: 0 K/UL
NRBC BLD-RTO: 0 /100 WBC
PLATELET # BLD AUTO: 119 K/UL (ref 164–446)
PMV BLD AUTO: 11 FL (ref 9–12.9)
RBC # BLD AUTO: 3.85 M/UL (ref 4.2–5.4)
SIGNIFICANT IND 70042: ABNORMAL
SIGNIFICANT IND 70042: ABNORMAL
SITE SITE: ABNORMAL
SITE SITE: ABNORMAL
SOURCE SOURCE: ABNORMAL
SOURCE SOURCE: ABNORMAL
WBC # BLD AUTO: 10.2 K/UL (ref 4.8–10.8)

## 2021-09-02 PROCEDURE — 700111 HCHG RX REV CODE 636 W/ 250 OVERRIDE (IP): Performed by: HOSPITALIST

## 2021-09-02 PROCEDURE — 700102 HCHG RX REV CODE 250 W/ 637 OVERRIDE(OP): Performed by: HOSPITALIST

## 2021-09-02 PROCEDURE — 94669 MECHANICAL CHEST WALL OSCILL: CPT

## 2021-09-02 PROCEDURE — 99406 BEHAV CHNG SMOKING 3-10 MIN: CPT

## 2021-09-02 PROCEDURE — 94640 AIRWAY INHALATION TREATMENT: CPT

## 2021-09-02 PROCEDURE — 700102 HCHG RX REV CODE 250 W/ 637 OVERRIDE(OP): Performed by: STUDENT IN AN ORGANIZED HEALTH CARE EDUCATION/TRAINING PROGRAM

## 2021-09-02 PROCEDURE — 71260 CT THORAX DX C+: CPT | Mod: ME

## 2021-09-02 PROCEDURE — 36415 COLL VENOUS BLD VENIPUNCTURE: CPT

## 2021-09-02 PROCEDURE — 700101 HCHG RX REV CODE 250: Performed by: HOSPITALIST

## 2021-09-02 PROCEDURE — 99223 1ST HOSP IP/OBS HIGH 75: CPT | Mod: GC | Performed by: INTERNAL MEDICINE

## 2021-09-02 PROCEDURE — 99233 SBSQ HOSP IP/OBS HIGH 50: CPT | Performed by: HOSPITALIST

## 2021-09-02 PROCEDURE — 770006 HCHG ROOM/CARE - MED/SURG/GYN SEMI*

## 2021-09-02 PROCEDURE — 700117 HCHG RX CONTRAST REV CODE 255: Performed by: STUDENT IN AN ORGANIZED HEALTH CARE EDUCATION/TRAINING PROGRAM

## 2021-09-02 PROCEDURE — A9270 NON-COVERED ITEM OR SERVICE: HCPCS | Performed by: STUDENT IN AN ORGANIZED HEALTH CARE EDUCATION/TRAINING PROGRAM

## 2021-09-02 PROCEDURE — A9270 NON-COVERED ITEM OR SERVICE: HCPCS | Performed by: HOSPITALIST

## 2021-09-02 PROCEDURE — 700105 HCHG RX REV CODE 258: Performed by: STUDENT IN AN ORGANIZED HEALTH CARE EDUCATION/TRAINING PROGRAM

## 2021-09-02 PROCEDURE — 82962 GLUCOSE BLOOD TEST: CPT

## 2021-09-02 PROCEDURE — 85025 COMPLETE CBC W/AUTO DIFF WBC: CPT

## 2021-09-02 PROCEDURE — 700111 HCHG RX REV CODE 636 W/ 250 OVERRIDE (IP): Performed by: STUDENT IN AN ORGANIZED HEALTH CARE EDUCATION/TRAINING PROGRAM

## 2021-09-02 RX ORDER — IPRATROPIUM BROMIDE AND ALBUTEROL SULFATE 2.5; .5 MG/3ML; MG/3ML
3 SOLUTION RESPIRATORY (INHALATION)
Status: DISCONTINUED | OUTPATIENT
Start: 2021-09-02 | End: 2021-09-08 | Stop reason: HOSPADM

## 2021-09-02 RX ORDER — CETIRIZINE HYDROCHLORIDE 10 MG/1
10 TABLET ORAL 2 TIMES DAILY
Status: DISCONTINUED | OUTPATIENT
Start: 2021-09-02 | End: 2021-09-08 | Stop reason: HOSPADM

## 2021-09-02 RX ORDER — OMEPRAZOLE 20 MG/1
20 CAPSULE, DELAYED RELEASE ORAL 2 TIMES DAILY
Status: DISCONTINUED | OUTPATIENT
Start: 2021-09-02 | End: 2021-09-08 | Stop reason: HOSPADM

## 2021-09-02 RX ORDER — PREDNISONE 20 MG/1
20 TABLET ORAL DAILY
Status: COMPLETED | OUTPATIENT
Start: 2021-09-03 | End: 2021-09-04

## 2021-09-02 RX ADMIN — PREDNISONE 40 MG: 20 TABLET ORAL at 05:57

## 2021-09-02 RX ADMIN — FLUOXETINE 20 MG: 20 CAPSULE ORAL at 05:57

## 2021-09-02 RX ADMIN — CETIRIZINE HYDROCHLORIDE 10 MG: 10 TABLET, FILM COATED ORAL at 05:57

## 2021-09-02 RX ADMIN — NICOTINE 14 MG: 14 PATCH TRANSDERMAL at 06:02

## 2021-09-02 RX ADMIN — LOVASTATIN 20 MG: 20 TABLET ORAL at 20:50

## 2021-09-02 RX ADMIN — OMEPRAZOLE 20 MG: 20 CAPSULE, DELAYED RELEASE ORAL at 17:51

## 2021-09-02 RX ADMIN — GUAIFENESIN 1200 MG: 600 TABLET, EXTENDED RELEASE ORAL at 05:56

## 2021-09-02 RX ADMIN — CETIRIZINE HYDROCHLORIDE 10 MG: 10 TABLET, FILM COATED ORAL at 17:51

## 2021-09-02 RX ADMIN — INSULIN HUMAN 3 UNITS: 100 INJECTION, SOLUTION PARENTERAL at 08:20

## 2021-09-02 RX ADMIN — OMEPRAZOLE 20 MG: 20 CAPSULE, DELAYED RELEASE ORAL at 05:58

## 2021-09-02 RX ADMIN — ENOXAPARIN SODIUM 40 MG: 40 INJECTION SUBCUTANEOUS at 06:01

## 2021-09-02 RX ADMIN — ONDANSETRON 4 MG: 2 INJECTION INTRAMUSCULAR; INTRAVENOUS at 05:49

## 2021-09-02 RX ADMIN — FLUTICASONE PROPIONATE 220 MCG: 110 AEROSOL, METERED RESPIRATORY (INHALATION) at 06:48

## 2021-09-02 RX ADMIN — ASPIRIN 81 MG: 81 TABLET, COATED ORAL at 05:58

## 2021-09-02 RX ADMIN — SODIUM CHLORIDE 3 G: 900 INJECTION INTRAVENOUS at 09:54

## 2021-09-02 RX ADMIN — LEVOTHYROXINE SODIUM 112 MCG: 0.11 TABLET ORAL at 05:58

## 2021-09-02 RX ADMIN — IBUPROFEN 600 MG: 600 TABLET ORAL at 20:59

## 2021-09-02 RX ADMIN — CAPTOPRIL 12.5 MG: 12.5 TABLET ORAL at 06:00

## 2021-09-02 RX ADMIN — MONTELUKAST 10 MG: 10 TABLET, FILM COATED ORAL at 05:57

## 2021-09-02 RX ADMIN — IOHEXOL 80 ML: 350 INJECTION, SOLUTION INTRAVENOUS at 17:25

## 2021-09-02 RX ADMIN — TIOTROPIUM BROMIDE INHALATION SPRAY 5 MCG: 3.12 SPRAY, METERED RESPIRATORY (INHALATION) at 06:48

## 2021-09-02 RX ADMIN — SODIUM CHLORIDE 3 G: 900 INJECTION INTRAVENOUS at 15:21

## 2021-09-02 RX ADMIN — INSULIN HUMAN 2 UNITS: 100 INJECTION, SOLUTION PARENTERAL at 20:58

## 2021-09-02 RX ADMIN — SODIUM CHLORIDE 3 G: 900 INJECTION INTRAVENOUS at 03:23

## 2021-09-02 RX ADMIN — SODIUM CHLORIDE 3 G: 900 INJECTION INTRAVENOUS at 20:48

## 2021-09-02 RX ADMIN — RALOXIFENE 60 MG: 60 TABLET ORAL at 05:59

## 2021-09-02 RX ADMIN — FLUTICASONE PROPIONATE 220 MCG: 110 AEROSOL, METERED RESPIRATORY (INHALATION) at 20:47

## 2021-09-02 RX ADMIN — IPRATROPIUM BROMIDE AND ALBUTEROL SULFATE 3 ML: .5; 2.5 SOLUTION RESPIRATORY (INHALATION) at 06:46

## 2021-09-02 RX ADMIN — GUAIFENESIN 1200 MG: 600 TABLET, EXTENDED RELEASE ORAL at 17:51

## 2021-09-02 RX ADMIN — INSULIN HUMAN 9 UNITS: 100 INJECTION, SOLUTION PARENTERAL at 17:08

## 2021-09-02 RX ADMIN — IBUPROFEN 600 MG: 600 TABLET ORAL at 05:47

## 2021-09-02 RX ADMIN — INSULIN HUMAN 3 UNITS: 100 INJECTION, SOLUTION PARENTERAL at 12:18

## 2021-09-02 ASSESSMENT — ENCOUNTER SYMPTOMS
HEADACHES: 0
BLURRED VISION: 0
CHILLS: 0
VOMITING: 0
DIZZINESS: 0
PSYCHIATRIC NEGATIVE: 1
NAUSEA: 0
CONSTITUTIONAL NEGATIVE: 1
GASTROINTESTINAL NEGATIVE: 1
WEIGHT LOSS: 0
PALPITATIONS: 0
NEUROLOGICAL NEGATIVE: 1
ABDOMINAL PAIN: 0
FOCAL WEAKNESS: 0
MYALGIAS: 0
WEAKNESS: 0
SHORTNESS OF BREATH: 1
MUSCULOSKELETAL NEGATIVE: 1
CARDIOVASCULAR NEGATIVE: 1
SORE THROAT: 0
DIAPHORESIS: 0
EYES NEGATIVE: 1
DEPRESSION: 0
COUGH: 1
SPUTUM PRODUCTION: 1
BRUISES/BLEEDS EASILY: 0
FEVER: 0

## 2021-09-02 ASSESSMENT — PAIN DESCRIPTION - PAIN TYPE
TYPE: ACUTE PAIN
TYPE: ACUTE PAIN

## 2021-09-02 ASSESSMENT — LIFESTYLE VARIABLES: SUBSTANCE_ABUSE: 0

## 2021-09-02 NOTE — PROGRESS NOTES
Hospital Medicine Daily Progress Note    Date of Service  9/2/2021    Chief Complaint  Michelle Espinoza is a 70 y.o. female admitted 8/31/2021 with shortness of breath, fevers and myalgias    Hospital Course  Patient is a 70 year old female with history of nicotine dependence, insulin dependent diabetes, copd with chronic hypoxic respiratory failure on 4L (5 with exertion at baseline), MICHAELA and unspecified psychiatric problem.  She presented to the ER with fevers and increasing sob.  She was found to have right sided pneumonia and was started on broad spectrum antibiotics.  She was also thought to have copd exacerbation and was started on steroids for this.    Interval Problem Update  She remains axox4, states she is pleased with her DM regimen, bs much better today and insulin is being well coordinated with nursing. Cough and sob continue to improve, currently below baseline oxygen needs.  We discussed blood culture results, awaiting sensitivities.  No dysuria, no abdominal pain, over all feeling better.  ROS otherwise negative.     I have personally seen and examined the patient at bedside. I discussed the plan of care with patient, nursing ID and case management.    Consultants/Specialty  ID    Code Status  Full Code    Disposition  Patient is not medically cleared.   Anticipate discharge to home  I have placed the appropriate orders for post-discharge needs.    Review of Systems  Review of Systems   Constitutional: Negative.  Negative for chills, diaphoresis, fever, malaise/fatigue and weight loss.   HENT: Negative.  Negative for sore throat.    Eyes: Negative.  Negative for blurred vision.   Respiratory: Positive for cough, sputum production and shortness of breath.    Cardiovascular: Negative.  Negative for chest pain, palpitations and leg swelling.   Gastrointestinal: Negative.  Negative for abdominal pain, nausea and vomiting.   Genitourinary: Negative.  Negative for dysuria.   Musculoskeletal: Negative.   Negative for myalgias.   Skin: Negative.  Negative for itching and rash.   Neurological: Negative.  Negative for dizziness, focal weakness, weakness and headaches.   Endo/Heme/Allergies: Negative.  Does not bruise/bleed easily.   Psychiatric/Behavioral: Negative.  Negative for depression, substance abuse and suicidal ideas.   All other systems reviewed and are negative.       Physical Exam  Temp:  [36.2 °C (97.1 °F)-36.6 °C (97.8 °F)] 36.2 °C (97.1 °F)  Pulse:  [72-94] 88  Resp:  [16-22] 18  BP: (128-156)/(73-88) 128/74  SpO2:  [93 %-96 %] 93 %    Physical Exam  Vitals and nursing note reviewed. Exam conducted with a chaperone present.   Constitutional:       General: She is not in acute distress.     Appearance: Normal appearance. She is not diaphoretic.   HENT:      Head: Normocephalic.      Nose: Nose normal.      Mouth/Throat:      Mouth: Mucous membranes are moist.   Eyes:      Pupils: Pupils are equal, round, and reactive to light.   Cardiovascular:      Rate and Rhythm: Normal rate and regular rhythm.      Pulses: Normal pulses.      Heart sounds: Normal heart sounds. No murmur heard.     Pulmonary:      Effort: Pulmonary effort is normal. No respiratory distress.      Breath sounds: No wheezing or rhonchi.      Comments: Air movement improving  Abdominal:      General: Abdomen is flat. Bowel sounds are normal. There is no distension.      Palpations: Abdomen is soft. There is no mass.      Tenderness: There is no abdominal tenderness. There is no guarding.   Musculoskeletal:         General: No swelling, tenderness or deformity. Normal range of motion.   Skin:     General: Skin is warm and dry.      Capillary Refill: Capillary refill takes less than 2 seconds.      Coloration: Skin is not jaundiced or pale.      Findings: No bruising or erythema.   Neurological:      General: No focal deficit present.      Mental Status: She is alert and oriented to person, place, and time.      Cranial Nerves: No cranial  nerve deficit.   Psychiatric:         Mood and Affect: Mood normal.         Behavior: Behavior normal.         Fluids    Intake/Output Summary (Last 24 hours) at 9/2/2021 1349  Last data filed at 9/2/2021 1300  Gross per 24 hour   Intake 840 ml   Output --   Net 840 ml       Laboratory  Recent Labs     08/31/21  0836 09/01/21  0814 09/02/21  0248   WBC 15.2* 13.8* 10.2   RBC 4.17* 4.18* 3.85*   HEMOGLOBIN 13.7 13.6 12.3   HEMATOCRIT 41.6 40.8 37.7   MCV 99.8* 97.6 97.9*   MCH 32.9 32.5 31.9   MCHC 32.9* 33.3* 32.6*   RDW 48.9 47.6 47.9   PLATELETCT 124* 126* 119*   MPV 11.3 11.6 11.0     Recent Labs     08/30/21  2317 08/31/21  0836 09/01/21  0814   SODIUM 139 136 134*   POTASSIUM 4.0 4.0 4.4   CHLORIDE 103 101 100   CO2 25 23 21   GLUCOSE 129* 224* 358*   BUN 27* 29* 27*   CREATININE 1.12 1.07 0.94   CALCIUM 9.0 8.8 9.6     Recent Labs     08/31/21  0230   INR 1.11               Imaging  DX-CHEST-PORTABLE (1 VIEW)   Final Result      Interstitial opacity compatible with underlying scarring      Some additional consolidative opacity right midlung zone could indicate bacterial or Covid pneumonia      Right apex subpleural opacity most likely from pleural-parenchymal scarring. Malignant nodule or infection cannot be excluded           Assessment/Plan  * Sepsis (HCC)- (present on admission)  Assessment & Plan  This is Sepsis Present on admission  SIRS criteria identified on my evaluation include: Tachycardia, with heart rate greater than 90 BPM, Tachypnea, with respirations greater than 20 per minute and Leukocytosis, with WBC greater than 12,000  Source is Respiratory  Sepsis protocol initiated  IV antibiotics as appropriate for source of sepsis  While organ dysfunction may be noted elsewhere in this problem list or in the chart, degree of organ dysfunction does not meet CMS criteria for severe sepsis  Sputum culture/blood culture x2, mycoplasma PCR, urine/strep urinary antigen ordered and pending  IV antibiotics  initiated in ED and will continue onward.  Procalcitonin ordered and pending.  Significant for middle lobar pneumonia, influenza A/B, RSV and Covid negative.  MRSA probe ordered and pending.    Empiric vanc stopped  ecoli bacteremia, sensitivities pending    Bacteremia  Assessment & Plan  ecoli  Sensitivities pending  ID to eval    MICHAELA (obstructive sleep apnea)  Assessment & Plan  Cpap ordered, pt having trouble tolerating hospital machine    Thrombocytopenia (HCC)- (present on admission)  Assessment & Plan  Mild  Slowly decreasing  following    COPD (chronic obstructive pulmonary disease) (HCC)- (present on admission)  Assessment & Plan  Continue supplemental oxygen to maintain SPO2 greater than 88%  Mild exacerbation  Continue steroid taper  Duo nebs as needed  Continue home medication regimen     Acute on chronic respiratory failure with hypoxia (HCC)- (present on admission)  Assessment & Plan  Continues to use tobacco however in agreement with tobacco cessation protocol.  Improving  Maintain SPO2 greater than 88%  Use 4 L at rest at home, currently stable on 3 L  Continue Pep therapy  Chest x-ray reveals middle right lobar pneumonia  Influenza A/B, RSV and Covid negative    Tobacco abuse- (present on admission)  Assessment & Plan  6 minutes tobacco cessation counseling commenced at bedside.  Patient explained and educated the risks of smoking.  Education provided, in agreement with alternatives such as nicotine patch, Nicorette gum.  Strong recommendation for outpatient follow-up with smoking cessation program at discharge as patient still has urges to smoke however in agreement with cessation.    Obesity- (present on admission)  Assessment & Plan  Strong recommendation for outpatient follow-up with PCP for lifestyle modification including diet and exercise regimen of at least 30 minutes brisk cardiovascular exercise 3-5 times per week.    Hypothyroid- (present on admission)  Assessment & Plan  Continue  Synthroid 112 mcg p.o. daily  Avoid calcium supplementation within 1 hour of administration    Uncontrolled type 1 diabetes mellitus (HCC)- (present on admission)  Assessment & Plan  Hemoglobin A1c of 6.1 as of July 2021  Continue long acting, SSI plus additional short acting with carb counting  Continue to follow  Bs improved  Hypoglycemic protocol       VTE prophylaxis: enoxaparin ppx    I have performed a physical exam and reviewed and updated ROS and Plan today (9/2/2021). In review of yesterday's note (9/1/2021), there are no changes except as documented above.

## 2021-09-02 NOTE — ASSESSMENT & PLAN NOTE
Ecoli, pan sensitive but persistent  Blood culture on 8/31+ for E. Coli  Blood culture on 9/1+ for E. Coli  Blood culture on 9/3 also positive for E. coli  CT abdomen showed possible septic emboli on the liver  TTE and MIMI did not show any vegetation  ID following and awaiting culture results   Repeated Preliminary blood culture negative  Started on ciprofloxacin

## 2021-09-02 NOTE — CARE PLAN
Problem: Risk for Infection - COPD  Goal: Patient will remain free from signs and symptoms of infection  Outcome: Progressing     Problem: Risk for Aspiration  Goal: Patient's risk for aspiration will be absent or decrease  Outcome: Progressing     Problem: Self Care  Goal: Patient will have the ability to perform ADLs independently or with assistance (bathe, groom, dress, toilet and feed)  Outcome: Progressing       The patient is Stable - Low risk of patient condition declining or worsening    Shift Goals  Clinical Goals: glucose control, med rec corrections    Progress made toward(s) clinical / shift goals: Pt WBC are trending down. Pt getting scheduled abx per MAR. Pt free of new signs/symptoms of infection. Pt able to swallow liquids/ solids without difficulty. Pt able to care for herself (dress herself, feed herself, go to the bathroom herself, groom herself, etc).

## 2021-09-02 NOTE — RESPIRATORY CARE
"COPD EDUCATION by COPD CLINICAL EDUCATOR  (Phone: [phone])  9/2/2021 at 8:20 AM by Yara Crockett, RRT    Patient seen by Respiratory Education team to complete the final block of education.  This session discussed signs and symptoms of an exacerbation (flare-up), triggers that can create flare-ups, reiteration of the \"Action Plan\" to refer to daily which will help categorize their symptoms in order to utilize the appropriate therapy, breathing techniques used to treat acute symptoms, and oxygen safety. Smoking Cessation was discussed as appropriate to this patient. Question and answer session followed. She agrees to Remote Monitoring Program. Discharge is still pending medical clearance.  Smoking Cessation Intervention and education completed, 4 minutes spent on smoking cessation education with patient.  Reinforced learning with tips to have success.    "

## 2021-09-02 NOTE — PROGRESS NOTES
Report received at start of shift.  Assessment complete.  A&O x 4. Patient calls appropriately.  Patient mobilizes independently. Bed alarm n/a. Pt had one instance of nausea during period of back pain. Provided anti-emetic per MAR. Tolerating cardiac diabetic diet.  Patient denies SOB.    Reviewed plan with of care with patient. Call light and personal belongings with in reach. Hourly rounding in place. All needs met at this time.

## 2021-09-02 NOTE — CONSULTS
[facility] INFECTIOUS DISEASES INPATIENT CONSULT NOTE     Date of Service: 9/2/2021    Consult Requested By: Connie Jarquin M.D.    Reason for Consultation: persistent e coli bacteremia s/p several doses antibiotics    Chief Complaint: shortness of breath, fevers, cough    History of Present Illness:     Michelle Espinoza is a 70 y.o. female with PMH of COPD, chronic hypoxic respiratory failure on 4L O2 at home, Type 1 diabetes mellitus diagnosed at age 41, HTN, DLD, obesity, MICHAELA admitted 8/31/2021 with e coli bacteremia and presumed right middle lobe pneumonia.     She states she is feeling much better currently. Prior to admission she was having significant shortness of breath and fevers as well as cough.  She states the cough is dry, however she did feel as if she had sputum that she just was not able to cough up.  She also feels she had some left upper chest pain which has since resolved as well as occasional palpitations. She denies having had dysuria, neck rigidity, left hip pain, abdominal pain, nausea, diarrhea.  She does note that she had one episode of nonbilious nonbloody vomiting on either the 28th or 29 of August, which was just prior to her return to [place] on 8/30 from a 9-day camping trip at [place] where she had primarily spent most of her time in a camper trailer.    Review of Systems:  All other systems reviewed and are negative expect as noted in HPI    Past Medical History:   Diagnosis Date   • Anesthesia     PONV   • Arthritis    • ASTHMA    • Cancer (HCC)     history of breast cancer   • Dental disorder     dentures, full upper and lower   • Diabetes     insulin pump   • Diabetic neuropathy (HCC)    • EMPHYSEMA    • Encounter for long-term (current) use of insulin ([facility]) 4/23/2014   • Fitting and adjustment of insulin pump 4/23/2014   • Heart burn    • osteopenia 4/23/2014   • Pain     hip   • Psychiatric problem     depression   • Sleep apnea     CPAP   • Unspecified disorder of thyroid      hypothyroid   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 at SUR[facility]   • BREAST RECONSTRUCTION  2001   • MASTECTOMY  1992    right, full   • MASTECTOMY  1987, 2001    partial, left x2   No valve replacements  Left hip replacement    Family History   Problem Relation Age of Onset   • Lung Disease Other        Social History     Socioeconomic History   • Marital status:      Spouse name: Not on file   • Number of children: Not on file   • Years of education: Not on file   • Highest education level: Not on file   Occupational History   • Not on file   Tobacco Use   • Smoking status: Current Every Day Smoker     Packs/day: 1.00     Years: 40.00     Pack years: 40.00     Types: Cigarettes   • Smokeless tobacco: Never Used   Vaping Use   • Vaping Use: Never used   Substance and Sexual Activity   • Alcohol use: Yes     Comment: 2 per day   • Drug use: No   • Sexual activity: Not on file   Other Topics Concern   • Not on file   Social History Narrative   • Not on file     Social Determinants of Health     Financial Resource Strain:    • Difficulty of Paying Living Expenses:    Food Insecurity:    • Worried About Running Out of Food in the Last Year:    • Ran Out of Food in the Last Year:    Transportation Needs:    • Lack of Transportation (Medical):    • Lack of Transportation (Non-Medical):    Physical Activity:    • Days of Exercise per Week:    • Minutes of Exercise per Session:    Stress:    • Feeling of Stress :    Social Connections:    • Frequency of Communication with Friends and Family:    • Frequency of Social Gatherings with Friends and Family:    • Attends Congregational Services:    • Active Member of Clubs or Organizations:    • Attends Club or Organization Meetings:    • Marital Status:    Intimate Partner Violence:    • Fear  of Current or Ex-Partner:    • Emotionally Abused:    • Physically Abused:    • Sexually Abused:        Allergies   Allergen Reactions   • Bactrim [Sulfamethoxazole W-Trimethoprim] Rash     hives   • Percocet [Oxycodone-Acetaminophen] Vomiting       Medications:    Current Facility-Administered Medications:   •  ipratropium-albuterol (DUONEB) nebulizer solution, 3 mL, Nebulization, Q4H PRN (RT), Gus De La Paz M.D.  •  [START ON 9/3/2021] predniSONE (DELTASONE) tablet 20 mg, 20 mg, Oral, DAILY, Connie Jarquin M.D.  •  omeprazole (PRILOSEC) capsule 20 mg, 20 mg, Oral, BID, Connie Jarquin M.D.  •  cetirizine (ZYRTEC) tablet 10 mg, 10 mg, Oral, BID, Connie Jarquin M.D.  •  captopril (CAPOTEN) tablet 12.5 mg, 12.5 mg, Oral, DAILY, Connie Jarquin M.D., 12.5 mg at 09/02/21 0600  •  mag hydrox-al hydrox-simeth (MAALOX PLUS ES or MYLANTA DS) suspension 10 mL, 10 mL, Oral, 4X/DAY PRN, Connie Jarquin M.D.  •  albuterol (PROVENTIL) 2.5 mg/0.5 mL nebulizer solution 2.5 mg, 2.5 mg, Nebulization, Q4H PRN (RT), LIANNA BairdOClaudio  •  aspirin EC (ECOTRIN) tablet 81 mg, 81 mg, Oral, DAILY, Emre Vazquez D.O., 81 mg at 09/02/21 0558  •  fluticasone (FLOVENT HFA) 110 MCG/ACT inhaler 220 mcg, 2 Puff, Inhalation, BID (RT), ANA Baird.OClaudio, 220 mcg at 09/02/21 0648  •  FLUoxetine (PROZAC) capsule 20 mg, 20 mg, Oral, DAILY, Emre Vazquez D.O., 20 mg at 09/02/21 0557  •  ipratropium-albuterol (DUONEB) nebulizer solution, 3 mL, Nebulization, Q6HRS PRN (RT), Emre Vazquez D.O.  •  levothyroxine (SYNTHROID) tablet 112 mcg, 112 mcg, Oral, AM ES, ANA Baird.O., 112 mcg at 09/02/21 0558  •  lovastatin (MEVACOR) tablet 20 mg, 20 mg, Oral, Nightly, LIANNA BairdO., 20 mg at 09/01/21 2112  •  montelukast (SINGULAIR) tablet 10 mg, 10 mg, Oral, DAILY, ANA Baird.O., 10 mg at 09/02/21 0557  •  raloxifene (EVISTA) tablet 60 mg, 60 mg, Oral, DAILY, ANA Baird.O., 60 mg at 09/02/21 0559  •   senna-docusate (PERICOLACE or SENOKOT S) 8.6-50 MG per tablet 2 Tablet, 2 Tablet, Oral, BID, 2 Tablet at 09/01/21 1747 **AND** polyethylene glycol/lytes (MIRALAX) PACKET 1 Packet, 1 Packet, Oral, QDAY PRN **AND** magnesium hydroxide (MILK OF MAGNESIA) suspension 30 mL, 30 mL, Oral, QDAY PRN **AND** bisacodyl (DULCOLAX) suppository 10 mg, 10 mg, Rectal, QDAY PRN, Emre Vazquez, D.O.  •  enoxaparin (LOVENOX) inj 40 mg, 40 mg, Subcutaneous, DAILY, Emre Vazquez D.O., 40 mg at 09/02/21 0601  •  ampicillin/sulbactam (UNASYN) 3 g in  mL IVPB, 3 g, Intravenous, Q6HRS, Emre Vazquez D.O., Stopped at 09/02/21 1024  •  guaiFENesin dextromethorphan (ROBITUSSIN DM) 100-10 MG/5ML syrup 10 mL, 10 mL, Oral, Q6HRS PRN, Emre Vazquez D.O.  •  ondansetron (ZOFRAN) syringe/vial injection 4 mg, 4 mg, Intravenous, Q4HRS PRN, Emre Vazquez D.O., 4 mg at 09/02/21 0549  •  ondansetron (ZOFRAN ODT) dispertab 4 mg, 4 mg, Oral, Q4HRS PRN, Emre Vazquez, D.O.  •  nicotine (NICODERM) 14 MG/24HR 14 mg, 14 mg, Transdermal, Daily-0600, 14 mg at 09/02/21 0602 **AND** Nicotine Replacement Patient Education Materials, , , Once **AND** nicotine polacrilex (NICORETTE) 2 MG piece 2 mg, 2 mg, Oral, Q HOUR PRN, Emre Vazquez D.O.  •  tiotropium (Spiriva Respimat) 2.5 mcg/Act inhalation spray 5 mcg, 5 mcg, Inhalation, QDAILY (RT), Emre Vazquez D.O., 5 mcg at 09/02/21 0648  •  ibuprofen (MOTRIN) tablet 600 mg, 600 mg, Oral, Q6HRS PRN, Emre Vazquez D.O., 600 mg at 09/02/21 0547  •  albuterol inhaler 1-2 Puff, 1-2 Puff, Inhalation, Q4H PRN (RT), Gus De La Paz M.D.  •  Respiratory Therapy Consult, , Nebulization, Continuous RT, Gus De La Paz M.D.  •  guaiFENesin ER (MUCINEX) tablet 1,200 mg, 1,200 mg, Oral, BID, Gus De La Paz M.D., 1,200 mg at 09/02/21 0556  •  acetaminophen (Tylenol) tablet 650 mg, 650 mg, Oral, Q6HRS PRN, Gus De La Paz M.D., 650 mg at 09/01/21 1357  •  insulin glargine (Semglee)  "injection, 44 Units, Subcutaneous, DAILY, Connie Jarquin M.D., 44 Units at 21 0612  •  insulin regular (HumuLIN R,NovoLIN R) injection, 2-9 Units, Subcutaneous, 4X/DAY ACHS, 3 Units at 21 1218 **AND** POC blood glucose manual result, , , Q AC AND BEDTIME(S) **AND** NOTIFY MD and PharmD, , , Once **AND** glucose 4 g chewable tablet 16 g, 16 g, Oral, Q15 MIN PRN **AND** dextrose 50% (D50W) injection 50 mL, 50 mL, Intravenous, Q15 MIN PRN, Connie Jarquin M.D.    Physical Exam:   Vital Signs: /74   Pulse 88   Temp 36.2 °C (97.1 °F) (Temporal)   Resp 18   Ht 1.727 m (5' 8\")   Wt 105 kg (231 lb 4.2 oz)   SpO2 93%   BMI 35.16 kg/m²   Temp  Av.5 °C (97.7 °F)  Min: 35.9 °C (96.6 °F)  Max: 37.1 °C (98.7 °F)  Vital signs reviewed    Constitutional: Appears well-developed and well-nourished. NAD  Head: Atraumatic, normocephalic  Neck: Normal range of motion  Eyes: Conjunctivae normal.   Mouth/Throat: Lips without lesions, good dentition, oropharynx is clear and moist.  Cardiovascular: Normal rate, regular rhythm, Normal S1S2. No murmur, gallop, or friction rub. No pedal edema.  Pulmonary/Chest: No respiratory distress. Unlabored respiratory effort, lungs clear to auscultation. No wheezes or rales.   Abdominal: Soft, nondistended. Tender to palpation in both upper quadrants (greater on right)  Musculoskeletal: No joint tenderness, swelling, erythema in knees, hips, elbows  Neurological: Grossly alert and oriented. No gross cranial nerve deficit. No focal neural deficit noted  Skin: Skin is warm and dry. No rashes or ulcers noted on back or extremities  Psychiatric: Normal mood and affect. Behavior is normal.     LABS:  Recent Labs     21  0836 21  0814 21  0248   WBC 15.2* 13.8* 10.2      Recent Labs     21  0836 21  0814 21  0248   HEMOGLOBIN 13.7 13.6 12.3   HEMATOCRIT 41.6 40.8 37.7   MCV 99.8* 97.6 97.9*   MCH 32.9 32.5 31.9   PLATELETCT 124* 126* 119* "       Recent Labs     08/30/21  2317 08/31/21  0836 09/01/21  0814   SODIUM 139 136 134*   POTASSIUM 4.0 4.0 4.4   CHLORIDE 103 101 100   CO2 25 23 21   CREATININE 1.12 1.07 0.94        Recent Labs     08/30/21  2317   ALBUMIN 3.3        MICRO:    Blood cx 8/31 e coli pansensitive  Blood cx 9/1 GNR  MRSA nares negative  COVID/fllue/RSV negative  UA 8/31 with positive LE, nitr,  WBCs, few bacteria    Latest pertinent labs were reviewed    IMAGING STUDIES:    CXR (8/31): RML opacity, no pleural effusions.   Interstitial opacity compatible with underlying scarring, Right apex subpleural opacity most likely from pleural-parenchymal scarring though malignant nodule or infection cannot be excluded    Hospital Course/Assessment:   Michelle Espinoza is a 70 y.o. female with PMH of COPD, chronic hypoxic respiratory failure on 4L O2 at home, Type 1 diabetes mellitus diagnosed at age 41, HTN, DLD, obesity, MICHAELA admitted 8/31/2021 with e coli bacteremia and presumed right middle lobe pneumonia.     Pertinent Diagnoses:    #E coli bacteremia  #Right middle lobe pneumonia  #Abdominal pain  Bacteremia persistent after approximately 36 hours of antibiotics. Possibly had high burden e coli PNA which is why it did not quickly clear, though this is uncommon. Also possibly has intra-abdominal source given her upper abdomen (more in RUQ) is tender to palpation. Other sources of infection such as meningitis, septic arthritis, SSTI, UTI unlikely given history and physical exam unrevealing for these.  Plan:   -f/u blood cultures from 8/31 and 9/1  -repeat blood culture on 9/3 (48 hours after 9/1)  -CT abdomen/pelvis with contrast to evaluate for intra-abdominal infection, likely in region of RUQ  -CT chest with contrast to further evaluate RML opacity  -transthoracic echocardiogram   -continue IV Unasyn for now    Plan was discussed with the primary team Dr. Jarquin   ID will continue to follow.     Please feel free to call with  questions.    Guillermina Soriano M.D.  Internal Medicine Resident PGY2

## 2021-09-03 ENCOUNTER — APPOINTMENT (OUTPATIENT)
Dept: CARDIOLOGY | Facility: MEDICAL CENTER | Age: 71
DRG: 871 | End: 2021-09-03
Attending: STUDENT IN AN ORGANIZED HEALTH CARE EDUCATION/TRAINING PROGRAM
Payer: MEDICARE

## 2021-09-03 LAB
BACTERIA UR CULT: NORMAL
BASOPHILS # BLD AUTO: 0.1 % (ref 0–1.8)
BASOPHILS # BLD: 0.01 K/UL (ref 0–0.12)
EOSINOPHIL # BLD AUTO: 0.03 K/UL (ref 0–0.51)
EOSINOPHIL NFR BLD: 0.4 % (ref 0–6.9)
ERYTHROCYTE [DISTWIDTH] IN BLOOD BY AUTOMATED COUNT: 49.8 FL (ref 35.9–50)
GLUCOSE BLD-MCNC: 100 MG/DL (ref 65–99)
GLUCOSE BLD-MCNC: 115 MG/DL (ref 65–99)
GLUCOSE BLD-MCNC: 176 MG/DL (ref 65–99)
GLUCOSE BLD-MCNC: 52 MG/DL (ref 65–99)
GLUCOSE BLD-MCNC: 66 MG/DL (ref 65–99)
GLUCOSE BLD-MCNC: 91 MG/DL (ref 65–99)
HCT VFR BLD AUTO: 38.8 % (ref 37–47)
HGB BLD-MCNC: 12.8 G/DL (ref 12–16)
IMM GRANULOCYTES # BLD AUTO: 0.05 K/UL (ref 0–0.11)
IMM GRANULOCYTES NFR BLD AUTO: 0.6 % (ref 0–0.9)
LV EJECT FRACT  99904: 60
LV EJECT FRACT MOD 2C 99903: 79.56
LV EJECT FRACT MOD 4C 99902: 65.91
LV EJECT FRACT MOD BP 99901: 75.51
LYMPHOCYTES # BLD AUTO: 1.33 K/UL (ref 1–4.8)
LYMPHOCYTES NFR BLD: 16 % (ref 22–41)
MCH RBC QN AUTO: 32.8 PG (ref 27–33)
MCHC RBC AUTO-ENTMCNC: 33 G/DL (ref 33.6–35)
MCV RBC AUTO: 99.5 FL (ref 81.4–97.8)
MONOCYTES # BLD AUTO: 0.96 K/UL (ref 0–0.85)
MONOCYTES NFR BLD AUTO: 11.5 % (ref 0–13.4)
NEUTROPHILS # BLD AUTO: 5.95 K/UL (ref 2–7.15)
NEUTROPHILS NFR BLD: 71.4 % (ref 44–72)
NRBC # BLD AUTO: 0 K/UL
NRBC BLD-RTO: 0 /100 WBC
PLATELET # BLD AUTO: 108 K/UL (ref 164–446)
PMV BLD AUTO: 11.1 FL (ref 9–12.9)
RBC # BLD AUTO: 3.9 M/UL (ref 4.2–5.4)
SIGNIFICANT IND 70042: NORMAL
SITE SITE: NORMAL
SOURCE SOURCE: NORMAL
WBC # BLD AUTO: 8.3 K/UL (ref 4.8–10.8)

## 2021-09-03 PROCEDURE — 85025 COMPLETE CBC W/AUTO DIFF WBC: CPT

## 2021-09-03 PROCEDURE — 94640 AIRWAY INHALATION TREATMENT: CPT

## 2021-09-03 PROCEDURE — A9270 NON-COVERED ITEM OR SERVICE: HCPCS | Performed by: STUDENT IN AN ORGANIZED HEALTH CARE EDUCATION/TRAINING PROGRAM

## 2021-09-03 PROCEDURE — A9270 NON-COVERED ITEM OR SERVICE: HCPCS | Performed by: HOSPITALIST

## 2021-09-03 PROCEDURE — 87040 BLOOD CULTURE FOR BACTERIA: CPT

## 2021-09-03 PROCEDURE — 87077 CULTURE AEROBIC IDENTIFY: CPT

## 2021-09-03 PROCEDURE — 700102 HCHG RX REV CODE 250 W/ 637 OVERRIDE(OP): Performed by: HOSPITALIST

## 2021-09-03 PROCEDURE — 700111 HCHG RX REV CODE 636 W/ 250 OVERRIDE (IP): Performed by: STUDENT IN AN ORGANIZED HEALTH CARE EDUCATION/TRAINING PROGRAM

## 2021-09-03 PROCEDURE — 700111 HCHG RX REV CODE 636 W/ 250 OVERRIDE (IP): Performed by: HOSPITALIST

## 2021-09-03 PROCEDURE — 700111 HCHG RX REV CODE 636 W/ 250 OVERRIDE (IP): Performed by: INTERNAL MEDICINE

## 2021-09-03 PROCEDURE — 700102 HCHG RX REV CODE 250 W/ 637 OVERRIDE(OP)

## 2021-09-03 PROCEDURE — 99233 SBSQ HOSP IP/OBS HIGH 50: CPT | Performed by: HOSPITALIST

## 2021-09-03 PROCEDURE — 93306 TTE W/DOPPLER COMPLETE: CPT

## 2021-09-03 PROCEDURE — 36415 COLL VENOUS BLD VENIPUNCTURE: CPT

## 2021-09-03 PROCEDURE — 700105 HCHG RX REV CODE 258: Performed by: STUDENT IN AN ORGANIZED HEALTH CARE EDUCATION/TRAINING PROGRAM

## 2021-09-03 PROCEDURE — 770006 HCHG ROOM/CARE - MED/SURG/GYN SEMI*

## 2021-09-03 PROCEDURE — 700102 HCHG RX REV CODE 250 W/ 637 OVERRIDE(OP): Performed by: STUDENT IN AN ORGANIZED HEALTH CARE EDUCATION/TRAINING PROGRAM

## 2021-09-03 PROCEDURE — A9270 NON-COVERED ITEM OR SERVICE: HCPCS

## 2021-09-03 PROCEDURE — 93306 TTE W/DOPPLER COMPLETE: CPT | Mod: 26 | Performed by: INTERNAL MEDICINE

## 2021-09-03 PROCEDURE — 82962 GLUCOSE BLOOD TEST: CPT | Mod: 91

## 2021-09-03 PROCEDURE — 700105 HCHG RX REV CODE 258: Performed by: INTERNAL MEDICINE

## 2021-09-03 PROCEDURE — 99233 SBSQ HOSP IP/OBS HIGH 50: CPT | Mod: GC | Performed by: INTERNAL MEDICINE

## 2021-09-03 PROCEDURE — 700101 HCHG RX REV CODE 250: Performed by: HOSPITALIST

## 2021-09-03 RX ADMIN — RALOXIFENE 60 MG: 60 TABLET ORAL at 05:21

## 2021-09-03 RX ADMIN — CETIRIZINE HYDROCHLORIDE 10 MG: 10 TABLET, FILM COATED ORAL at 16:49

## 2021-09-03 RX ADMIN — SODIUM CHLORIDE 3 G: 900 INJECTION INTRAVENOUS at 09:56

## 2021-09-03 RX ADMIN — Medication 16 G: at 06:47

## 2021-09-03 RX ADMIN — GUAIFENESIN 1200 MG: 600 TABLET, EXTENDED RELEASE ORAL at 05:15

## 2021-09-03 RX ADMIN — ASPIRIN 81 MG: 81 TABLET, COATED ORAL at 05:17

## 2021-09-03 RX ADMIN — OMEPRAZOLE 20 MG: 20 CAPSULE, DELAYED RELEASE ORAL at 05:16

## 2021-09-03 RX ADMIN — INSULIN HUMAN 8 UNITS: 100 INJECTION, SOLUTION PARENTERAL at 17:24

## 2021-09-03 RX ADMIN — FLUTICASONE PROPIONATE 220 MCG: 110 AEROSOL, METERED RESPIRATORY (INHALATION) at 06:46

## 2021-09-03 RX ADMIN — DOCUSATE SODIUM 50 MG AND SENNOSIDES 8.6 MG 2 TABLET: 8.6; 5 TABLET, FILM COATED ORAL at 16:49

## 2021-09-03 RX ADMIN — SODIUM CHLORIDE 3 G: 900 INJECTION INTRAVENOUS at 04:10

## 2021-09-03 RX ADMIN — IPRATROPIUM BROMIDE AND ALBUTEROL SULFATE 3 ML: .5; 2.5 SOLUTION RESPIRATORY (INHALATION) at 21:20

## 2021-09-03 RX ADMIN — CAPTOPRIL 12.5 MG: 12.5 TABLET ORAL at 05:21

## 2021-09-03 RX ADMIN — SODIUM CHLORIDE 3 G: 900 INJECTION INTRAVENOUS at 14:48

## 2021-09-03 RX ADMIN — CETIRIZINE HYDROCHLORIDE 10 MG: 10 TABLET, FILM COATED ORAL at 05:17

## 2021-09-03 RX ADMIN — INSULIN HUMAN 6 UNITS: 100 INJECTION, SOLUTION PARENTERAL at 11:57

## 2021-09-03 RX ADMIN — MONTELUKAST 10 MG: 10 TABLET, FILM COATED ORAL at 05:15

## 2021-09-03 RX ADMIN — GUAIFENESIN 1200 MG: 600 TABLET, EXTENDED RELEASE ORAL at 16:49

## 2021-09-03 RX ADMIN — IBUPROFEN 600 MG: 600 TABLET ORAL at 20:46

## 2021-09-03 RX ADMIN — LEVOTHYROXINE SODIUM 112 MCG: 0.11 TABLET ORAL at 05:16

## 2021-09-03 RX ADMIN — ALBUTEROL SULFATE 2 PUFF: 90 AEROSOL, METERED RESPIRATORY (INHALATION) at 12:45

## 2021-09-03 RX ADMIN — PREDNISONE 20 MG: 20 TABLET ORAL at 05:16

## 2021-09-03 RX ADMIN — ENOXAPARIN SODIUM 40 MG: 40 INJECTION SUBCUTANEOUS at 05:18

## 2021-09-03 RX ADMIN — SODIUM CHLORIDE 3 G: 900 INJECTION INTRAVENOUS at 20:48

## 2021-09-03 RX ADMIN — OMEPRAZOLE 20 MG: 20 CAPSULE, DELAYED RELEASE ORAL at 16:49

## 2021-09-03 RX ADMIN — INSULIN HUMAN 5 UNITS: 100 INJECTION, SOLUTION PARENTERAL at 08:07

## 2021-09-03 RX ADMIN — IBUPROFEN 600 MG: 600 TABLET ORAL at 05:15

## 2021-09-03 RX ADMIN — ALBUTEROL SULFATE 2 PUFF: 90 AEROSOL, METERED RESPIRATORY (INHALATION) at 09:57

## 2021-09-03 RX ADMIN — LOVASTATIN 20 MG: 20 TABLET ORAL at 20:46

## 2021-09-03 RX ADMIN — FLUOXETINE 20 MG: 20 CAPSULE ORAL at 05:17

## 2021-09-03 RX ADMIN — NICOTINE 14 MG: 14 PATCH TRANSDERMAL at 05:18

## 2021-09-03 RX ADMIN — IBUPROFEN 600 MG: 600 TABLET ORAL at 14:06

## 2021-09-03 RX ADMIN — TIOTROPIUM BROMIDE INHALATION SPRAY 5 MCG: 3.12 SPRAY, METERED RESPIRATORY (INHALATION) at 06:47

## 2021-09-03 ASSESSMENT — ENCOUNTER SYMPTOMS
CONSTITUTIONAL NEGATIVE: 1
HEADACHES: 0
SORE THROAT: 0
FEVER: 0
MYALGIAS: 0
BRUISES/BLEEDS EASILY: 0
SHORTNESS OF BREATH: 1
CHILLS: 0
ABDOMINAL PAIN: 0
CARDIOVASCULAR NEGATIVE: 1
WEAKNESS: 0
DIAPHORESIS: 0
DEPRESSION: 0
SPUTUM PRODUCTION: 1
PSYCHIATRIC NEGATIVE: 1
EYES NEGATIVE: 1
DIZZINESS: 0
COUGH: 1
PALPITATIONS: 0
VOMITING: 0
NAUSEA: 0
FOCAL WEAKNESS: 0
BLURRED VISION: 0
MUSCULOSKELETAL NEGATIVE: 1
GASTROINTESTINAL NEGATIVE: 1
NEUROLOGICAL NEGATIVE: 1
WEIGHT LOSS: 0

## 2021-09-03 ASSESSMENT — PAIN DESCRIPTION - PAIN TYPE
TYPE: ACUTE PAIN

## 2021-09-03 ASSESSMENT — LIFESTYLE VARIABLES: SUBSTANCE_ABUSE: 0

## 2021-09-03 NOTE — PROGRESS NOTES
Hospital Medicine Daily Progress Note    Date of Service  9/3/2021    Chief Complaint  Michelle Espinoza is a 70 y.o. female admitted 8/31/2021 with shortness of breath, fevers and myalgias    Hospital Course  Patient is a 70 year old female with history of nicotine dependence, insulin dependent diabetes, copd with chronic hypoxic respiratory failure on 4L (5 with exertion at baseline), MICHAELA and unspecified psychiatric problem.  She presented to the ER with fevers and increasing sob.  She was found to have right sided pneumonia and was started on broad spectrum antibiotics. She was also thought to have copd exacerbation and was started on steroids for this.    Interval Problem Update  Axox3, pt reports cough and sob continues to improve, she expressed frustration with the timing of visits from respiratory therapy - her nurse is helping coordinate this and prn treatments.  Pt denies abdominal pain, none on palpation.  No dysuria.    Hypoglycemia this am, pt denies associated symptoms. ROS otherwise negative. Echo pending    I have personally seen and examined the patient at bedside. I discussed the plan of care with patient, nursing ID and case management.    Consultants/Specialty  ID    Code Status  Full Code    Disposition  Patient is not medically cleared.   Anticipate discharge to home  I have placed the appropriate orders for post-discharge needs.    Review of Systems  Review of Systems   Constitutional: Negative.  Negative for chills, diaphoresis, fever, malaise/fatigue and weight loss.   HENT: Negative.  Negative for sore throat.    Eyes: Negative.  Negative for blurred vision.   Respiratory: Positive for cough, sputum production and shortness of breath.    Cardiovascular: Negative.  Negative for chest pain, palpitations and leg swelling.   Gastrointestinal: Negative.  Negative for abdominal pain, nausea and vomiting.   Genitourinary: Negative.  Negative for dysuria.   Musculoskeletal: Negative.  Negative for  myalgias.   Skin: Negative.  Negative for itching and rash.   Neurological: Negative.  Negative for dizziness, focal weakness, weakness and headaches.   Endo/Heme/Allergies: Negative.  Does not bruise/bleed easily.   Psychiatric/Behavioral: Negative.  Negative for depression, substance abuse and suicidal ideas.   All other systems reviewed and are negative.       Physical Exam  Temp:  [36.2 °C (97.2 °F)-36.8 °C (98.2 °F)] 36.2 °C (97.2 °F)  Pulse:  [64-73] 65  Resp:  [18] 18  BP: (127-166)/(81-90) 127/81  SpO2:  [94 %-96 %] 94 %    Physical Exam  Vitals and nursing note reviewed. Exam conducted with a chaperone present.   Constitutional:       General: She is not in acute distress.     Appearance: Normal appearance. She is not diaphoretic.   HENT:      Head: Normocephalic.      Nose: Nose normal. No congestion.      Mouth/Throat:      Mouth: Mucous membranes are moist.      Pharynx: Oropharynx is clear. No oropharyngeal exudate.   Eyes:      General:         Right eye: No discharge.         Left eye: No discharge.      Pupils: Pupils are equal, round, and reactive to light.   Neck:      Vascular: No carotid bruit.   Cardiovascular:      Rate and Rhythm: Normal rate and regular rhythm.      Pulses: Normal pulses.      Heart sounds: Normal heart sounds. No murmur heard.     Pulmonary:      Effort: Pulmonary effort is normal. No respiratory distress.      Breath sounds: No wheezing or rhonchi.      Comments: Air movement continues to improve  Abdominal:      General: Abdomen is flat. Bowel sounds are normal. There is no distension.      Palpations: Abdomen is soft. There is no mass.      Tenderness: There is no abdominal tenderness. There is no guarding.   Musculoskeletal:         General: No swelling, tenderness or deformity. Normal range of motion.      Cervical back: No rigidity or tenderness.   Lymphadenopathy:      Cervical: No cervical adenopathy.   Skin:     General: Skin is warm and dry.      Capillary Refill:  Capillary refill takes less than 2 seconds.      Coloration: Skin is not jaundiced or pale.      Findings: No bruising or erythema.   Neurological:      General: No focal deficit present.      Mental Status: She is alert and oriented to person, place, and time.      Cranial Nerves: No cranial nerve deficit.   Psychiatric:         Mood and Affect: Mood normal.         Behavior: Behavior normal.         Fluids    Intake/Output Summary (Last 24 hours) at 9/3/2021 1345  Last data filed at 9/3/2021 1000  Gross per 24 hour   Intake 713.33 ml   Output --   Net 713.33 ml       Laboratory  Recent Labs     09/01/21  0814 09/02/21  0248 09/03/21  0319   WBC 13.8* 10.2 8.3   RBC 4.18* 3.85* 3.90*   HEMOGLOBIN 13.6 12.3 12.8   HEMATOCRIT 40.8 37.7 38.8   MCV 97.6 97.9* 99.5*   MCH 32.5 31.9 32.8   MCHC 33.3* 32.6* 33.0*   RDW 47.6 47.9 49.8   PLATELETCT 126* 119* 108*   MPV 11.6 11.0 11.1     Recent Labs     09/01/21  0814   SODIUM 134*   POTASSIUM 4.4   CHLORIDE 100   CO2 21   GLUCOSE 358*   BUN 27*   CREATININE 0.94   CALCIUM 9.6                   Imaging  CT-CHEST,ABDOMEN,PELVIS WITH   Final Result         1. Small peripheral ill-defined wedge-shaped low attenuating areas throughout the liver could relate to small infarcts secondary to septic emboli.         2. Ill-defined decreased enhancement in the left kidney is nonspecific and could be seen with pyelonephritis. Renal infarct is in the differential but this appearance is not typical.      3. Hazy peripheral groundglass opacities throughout both lungs with interlobular septal thickening are nonspecific but could relate to infection as well.      DX-CHEST-PORTABLE (1 VIEW)   Final Result      Interstitial opacity compatible with underlying scarring      Some additional consolidative opacity right midlung zone could indicate bacterial or Covid pneumonia      Right apex subpleural opacity most likely from pleural-parenchymal scarring. Malignant nodule or infection cannot be  excluded      EC-ECHOCARDIOGRAM COMPLETE W/O CONT    (Results Pending)        Assessment/Plan  * Sepsis (HCC)- (present on admission)  Assessment & Plan  This is Sepsis Present on admission  SIRS criteria identified on my evaluation include: Tachycardia, with heart rate greater than 90 BPM, Tachypnea, with respirations greater than 20 per minute and Leukocytosis, with WBC greater than 12,000  Source is Respiratory  Sepsis protocol initiated  IV antibiotics as appropriate for source of sepsis  While organ dysfunction may be noted elsewhere in this problem list or in the chart, degree of organ dysfunction does not meet CMS criteria for severe sepsis  Sputum culture/blood culture x2, mycoplasma PCR, urine/strep urinary antigen ordered and pending  IV antibiotics initiated in ED and will continue onward.  Procalcitonin ordered and pending.  Significant for middle lobar pneumonia, influenza A/B, RSV and Covid negative.  MRSA probe ordered and pending.    Empiric vanc stopped  ecoli bacteremia pan sensitive but persistent    Bacteremia  Assessment & Plan  Ecoli, pan sensitive but persistent  CT abdomen done - non specific findings, possibly reflective of septic emboli in the liver, possible findings suggestive of pyelonephritis and non specific pulmonary findings    Echo pending  ID following  Continue unasyn for now         MICHAELA (obstructive sleep apnea)  Assessment & Plan  Cpap ordered, pt having trouble tolerating hospital machine    Thrombocytopenia (HCC)- (present on admission)  Assessment & Plan  Mild  Continues to slowly decrease  following    COPD (chronic obstructive pulmonary disease) (HCC)- (present on admission)  Assessment & Plan  Continue supplemental oxygen to maintain SPO2 greater than 88%  Mild exacerbation  Continue steroid taper  Duo nebs as needed  improving    Acute on chronic respiratory failure with hypoxia (HCC)- (present on admission)  Assessment & Plan  Continues to use tobacco however in  agreement with tobacco cessation protocol.  Maintain SPO2 greater than 88%  Continue steroid taper  Use 4 L at rest at home  Today stable on 3 L  Exam slowly improving  Continue Pep therapy  Chest x-ray reveals middle right lobar pneumonia  Influenza A/B, RSV and Covid negative    Tobacco abuse- (present on admission)  Assessment & Plan  6 minutes tobacco cessation counseling commenced at bedside.  Patient explained and educated the risks of smoking.  Education provided, in agreement with alternatives such as nicotine patch, Nicorette gum.  Strong recommendation for outpatient follow-up with smoking cessation program at discharge as patient still has urges to smoke however in agreement with cessation.    Obesity- (present on admission)  Assessment & Plan  Strong recommendation for outpatient follow-up with PCP for lifestyle modification including diet and exercise regimen of at least 30 minutes brisk cardiovascular exercise 3-5 times per week.    Hypothyroid- (present on admission)  Assessment & Plan  Continue Synthroid 112 mcg p.o. daily  Avoid calcium supplementation within 1 hour of administration    Uncontrolled type 1 diabetes mellitus (HCC)- (present on admission)  Assessment & Plan  Hemoglobin A1c of 6.1 as of July 2021  Continue long acting, SSI plus additional short acting with carb counting  Continue to follow  Hypoglycemia this am, pt reports she is eating less than normal due to the food here, she agrees to decrease long acting insulin some  Continue to folllow       VTE prophylaxis: enoxaparin ppx    I have performed a physical exam and reviewed and updated ROS and Plan today (9/3/2021). In review of yesterday's note (9/2/2021), there are no changes except as documented above.

## 2021-09-03 NOTE — CARE PLAN
Problem: Risk for Aspiration  Goal: Patient's risk for aspiration will be absent or decrease  Outcome: Progressing     Problem: Self Care  Goal: Patient will have the ability to perform ADLs independently or with assistance (bathe, groom, dress, toilet and feed)  Outcome: Progressing     Problem: Pain - Standard  Goal: Alleviation of pain or a reduction in pain to the patient’s comfort goal  Outcome: Progressing       The patient is Stable - Low risk of patient condition declining or worsening    Shift Goals  Clinical Goals: infection control, med rec corrections    Progress made toward(s) clinical / shift goals: Pt reported minimal to no pain throughout the day. Pt has no difficulty swallowing liquids or solids. Pt is tolerating current cardiac/diabetic regular diet. Pt able to groom self, brush own teeth, and change own clothes. Pt is up to the bathroom by self.

## 2021-09-03 NOTE — PROGRESS NOTES
"[facility] INFECTIOUS DISEASES INPATIENT PROGRESS NOTE     Date of Service: 9/3/2021    Consult Requested By: Connie Jarquin M.D.    Reason for Consultation: persistent e coli bacteremia s/p several doses antibiotics    Chief Complaint: shortness of breath, fevers, cough    Subjective:     Michelle Espinoza is a 70 y.o. female with PMH of COPD, chronic hypoxic respiratory failure on 4L O2 at home, Type 1 diabetes mellitus diagnosed at age 41, HTN, DLD, obesity, MICHAELA admitted 2021 with e coli bacteremia and presumed right middle lobe pneumonia.     She states she is feeling about the same as the same as yesterday. She denies fevers or chills, neck rigidity, chest pain or palpitations, joint pain, flank pain.    She denies ever visiting outside the country or eating undercooked or raw meats, preferring her meat to be well done.    Review of Systems:  All other systems reviewed and are negative expect as noted in HPI     Physical Exam:   Vital Signs: /81   Pulse 65   Temp 36.2 °C (97.2 °F) (Temporal)   Resp 18   Ht 1.727 m (5' 8\")   Wt 105 kg (231 lb 4.2 oz)   SpO2 94%   BMI 35.16 kg/m²   Temp  Av.5 °C (97.7 °F)  Min: 35.9 °C (96.6 °F)  Max: 37.1 °C (98.7 °F)  Vital signs reviewed    Constitutional: Appears well-developed and well-nourished. NAD  Head: Atraumatic, normocephalic  Neck: Normal range of motion  Eyes: Conjunctivae normal.   Cardiovascular: Normal rate, regular rhythm, Normal S1S2. No murmur, gallop, or friction rub. No pedal edema.  Pulmonary/Chest: No respiratory distress. Unlabored respiratory effort, lungs clear to auscultation. No wheezes or rales.   Abdominal: Soft, nondistended. Mildly tender to deep palpation in both upper quadrants (greater on right)  Musculoskeletal: No joint tenderness, swelling, erythema in knees, hips, elbows  Neurological: Grossly alert and oriented. No gross cranial nerve deficit. No focal neural deficit noted  Skin: Skin is warm and dry. No rashes or ulcers " noted on back or extremities  Psychiatric: Normal mood and affect. Behavior is normal.     LABS:  Recent Labs     08/31/21  0836 09/01/21  0814 09/02/21  0248   WBC 15.2* 13.8* 10.2      Recent Labs     09/01/21  0814 09/02/21  0248 09/03/21  0319   HEMOGLOBIN 13.6 12.3 12.8   HEMATOCRIT 40.8 37.7 38.8   MCV 97.6 97.9* 99.5*   MCH 32.5 31.9 32.8   PLATELETCT 126* 119* 108*       Recent Labs     08/30/21  2317 08/31/21  0836 09/01/21  0814   SODIUM 139 136 134*   POTASSIUM 4.0 4.0 4.4   CHLORIDE 103 101 100   CO2 25 23 21   CREATININE 1.12 1.07 0.94        Recent Labs     08/30/21 2317   ALBUMIN 3.3        MICRO:    Blood cx 8/31 e coli pansensitive  Blood cx 9/1 e coli, sensitivities pending  Blood cx 9/3 pending  MRSA nares negative  COVID/flu/RSV negative  UA 8/31 with positive LE, nitr,  WBCs, few bacteria    Latest pertinent labs were reviewed    IMAGING STUDIES:    CXR (8/31): RML opacity, no pleural effusions.   Interstitial opacity compatible with underlying scarring, Right apex subpleural opacity most likely from pleural-parenchymal scarring though malignant nodule or infection cannot be excluded    CT abdomen/pelvis and chest (9/2):  1. Small peripheral ill-defined wedge-shaped low attenuating areas throughout the liver could relate to small infarcts secondary to septic emboli.        2. Ill-defined decreased enhancement in the left kidney is nonspecific and could be seen with pyelonephritis. Renal infarct is in the differential but this appearance is not typical.     3. Hazy peripheral groundglass opacities throughout both lungs with interlobular septal thickening are nonspecific but could relate to infection as well.    Hospital Course/Assessment:   Michelle Espinoza is a 70 y.o. female with PMH of COPD, chronic hypoxic respiratory failure on 4L O2 at home, Type 1 diabetes mellitus diagnosed at age 41, HTN, DLD, obesity, MICHAELA admitted 8/31/2021 with e coli bacteremia and presumed right middle lobe  pneumonia.     Pertinent Diagnoses:    #E coli bacteremia  #Right middle lobe pneumonia  #Abdominal pain  -improved. Bacteremia was persistent after approximately 36 hours of initiation of antibiotics. Possibly had high burden e coli PNA which is why it did not quickly clear, though this is uncommon. Endocarditis is possibility also given possible septic emboli in liver on CT. Other sources of infection such as meningitis, septic arthritis, SSTI, UTI unlikely given history and physical exam unrevealing for these.  -CT abdomen/pelvis revealing of multiple small wedge shaped low attenuating areas of liver. Left kidney also with decreased enhancement  -CT chest with multiple bilateral peripheral ground glass opacities and interlobular septal thickening, nonspecific, possibly related to pneumonia  Plan:   -f/u blood cultures from 8/31, 9/1, and 9/3  -transthoracic echocardiogram pending. If TTE positive for bacteremia, will need at least 7 days antibiotics from negative cultures. If TTE negative for endocarditis, consider MIMI to completely rule it out.  -continue IV Unasyn for now    Plan was discussed with the primary team Dr. Jarquin   ID will continue to follow.     Please feel free to call with questions.    Guillermina Soriano M.D.  Internal Medicine Resident PGY2

## 2021-09-03 NOTE — CARE PLAN
Problem: Risk for Aspiration  Goal: Patient's risk for aspiration will be absent or decrease  Outcome: Progressing     Problem: Self Care  Goal: Patient will have the ability to perform ADLs independently or with assistance (bathe, groom, dress, toilet and feed)  Outcome: Progressing     Problem: Respiratory  Goal: Patient will achieve/maintain optimum respiratory ventilation and gas exchange  Outcome: Progressing     The patient is Stable - Low risk of patient condition declining or worsening    Shift Goals  Clinical Goals: glucose control, infection control    Progress made toward(s) clinical / shift goals: Pt is at baseline in terms of respiratory health and oxygen use. Pt grooms self and gets to bathroom herself. Pt is able to dress self. Pt swallows liquids and solids without difficulty. Pt is tolerating regular (Cardiac/DM) diet.

## 2021-09-03 NOTE — PROGRESS NOTES
Report received at start of shift.  Assessment complete.  A&O x 4. Patient calls appropriately.  Pt denies N/V. Tolerating cardiac diabetic diet.  Patient denies SOB.  +void, + flatus, + BM    Reviewed plan with of care with patient. Call light and personal belongings with in reach. Hourly rounding in place. All needs met at this time.

## 2021-09-04 LAB
BACTERIA BLD CULT: ABNORMAL
BASOPHILS # BLD AUTO: 0.3 % (ref 0–1.8)
BASOPHILS # BLD: 0.02 K/UL (ref 0–0.12)
EOSINOPHIL # BLD AUTO: 0.07 K/UL (ref 0–0.51)
EOSINOPHIL NFR BLD: 0.9 % (ref 0–6.9)
ERYTHROCYTE [DISTWIDTH] IN BLOOD BY AUTOMATED COUNT: 49.3 FL (ref 35.9–50)
GLUCOSE BLD-MCNC: 138 MG/DL (ref 65–99)
GLUCOSE BLD-MCNC: 177 MG/DL (ref 65–99)
GLUCOSE BLD-MCNC: 238 MG/DL (ref 65–99)
GLUCOSE BLD-MCNC: 252 MG/DL (ref 65–99)
GLUCOSE BLD-MCNC: 55 MG/DL (ref 65–99)
GLUCOSE BLD-MCNC: 92 MG/DL (ref 65–99)
HCT VFR BLD AUTO: 38 % (ref 37–47)
HGB BLD-MCNC: 12.3 G/DL (ref 12–16)
IMM GRANULOCYTES # BLD AUTO: 0.1 K/UL (ref 0–0.11)
IMM GRANULOCYTES NFR BLD AUTO: 1.3 % (ref 0–0.9)
LYMPHOCYTES # BLD AUTO: 1.27 K/UL (ref 1–4.8)
LYMPHOCYTES NFR BLD: 17.1 % (ref 22–41)
MCH RBC QN AUTO: 32.5 PG (ref 27–33)
MCHC RBC AUTO-ENTMCNC: 32.4 G/DL (ref 33.6–35)
MCV RBC AUTO: 100.3 FL (ref 81.4–97.8)
MONOCYTES # BLD AUTO: 0.68 K/UL (ref 0–0.85)
MONOCYTES NFR BLD AUTO: 9.1 % (ref 0–13.4)
NEUTROPHILS # BLD AUTO: 5.3 K/UL (ref 2–7.15)
NEUTROPHILS NFR BLD: 71.3 % (ref 44–72)
NRBC # BLD AUTO: 0 K/UL
NRBC BLD-RTO: 0 /100 WBC
PLATELET # BLD AUTO: 137 K/UL (ref 164–446)
PMV BLD AUTO: 11 FL (ref 9–12.9)
RBC # BLD AUTO: 3.79 M/UL (ref 4.2–5.4)
SIGNIFICANT IND 70042: ABNORMAL
SIGNIFICANT IND 70042: ABNORMAL
SITE SITE: ABNORMAL
SITE SITE: ABNORMAL
SOURCE SOURCE: ABNORMAL
SOURCE SOURCE: ABNORMAL
WBC # BLD AUTO: 7.4 K/UL (ref 4.8–10.8)

## 2021-09-04 PROCEDURE — 94760 N-INVAS EAR/PLS OXIMETRY 1: CPT

## 2021-09-04 PROCEDURE — 700102 HCHG RX REV CODE 250 W/ 637 OVERRIDE(OP): Performed by: STUDENT IN AN ORGANIZED HEALTH CARE EDUCATION/TRAINING PROGRAM

## 2021-09-04 PROCEDURE — 94640 AIRWAY INHALATION TREATMENT: CPT

## 2021-09-04 PROCEDURE — 700102 HCHG RX REV CODE 250 W/ 637 OVERRIDE(OP): Performed by: HOSPITALIST

## 2021-09-04 PROCEDURE — 700111 HCHG RX REV CODE 636 W/ 250 OVERRIDE (IP): Performed by: HOSPITALIST

## 2021-09-04 PROCEDURE — A9270 NON-COVERED ITEM OR SERVICE: HCPCS | Performed by: HOSPITALIST

## 2021-09-04 PROCEDURE — A9270 NON-COVERED ITEM OR SERVICE: HCPCS | Performed by: STUDENT IN AN ORGANIZED HEALTH CARE EDUCATION/TRAINING PROGRAM

## 2021-09-04 PROCEDURE — 700111 HCHG RX REV CODE 636 W/ 250 OVERRIDE (IP): Performed by: STUDENT IN AN ORGANIZED HEALTH CARE EDUCATION/TRAINING PROGRAM

## 2021-09-04 PROCEDURE — 99233 SBSQ HOSP IP/OBS HIGH 50: CPT | Performed by: INTERNAL MEDICINE

## 2021-09-04 PROCEDURE — 85025 COMPLETE CBC W/AUTO DIFF WBC: CPT

## 2021-09-04 PROCEDURE — 36415 COLL VENOUS BLD VENIPUNCTURE: CPT

## 2021-09-04 PROCEDURE — 770020 HCHG ROOM/CARE - TELE (206)

## 2021-09-04 PROCEDURE — 700111 HCHG RX REV CODE 636 W/ 250 OVERRIDE (IP): Performed by: INTERNAL MEDICINE

## 2021-09-04 PROCEDURE — 700101 HCHG RX REV CODE 250: Performed by: HOSPITALIST

## 2021-09-04 PROCEDURE — 700105 HCHG RX REV CODE 258: Performed by: INTERNAL MEDICINE

## 2021-09-04 PROCEDURE — 99233 SBSQ HOSP IP/OBS HIGH 50: CPT | Performed by: HOSPITALIST

## 2021-09-04 PROCEDURE — 82962 GLUCOSE BLOOD TEST: CPT

## 2021-09-04 RX ORDER — SIMETHICONE 80 MG
80 TABLET,CHEWABLE ORAL 3 TIMES DAILY PRN
Status: DISCONTINUED | OUTPATIENT
Start: 2021-09-04 | End: 2021-09-08 | Stop reason: HOSPADM

## 2021-09-04 RX ORDER — ENALAPRILAT 1.25 MG/ML
1.25 INJECTION INTRAVENOUS EVERY 6 HOURS PRN
Status: DISCONTINUED | OUTPATIENT
Start: 2021-09-04 | End: 2021-09-08 | Stop reason: HOSPADM

## 2021-09-04 RX ADMIN — RALOXIFENE 60 MG: 60 TABLET ORAL at 05:57

## 2021-09-04 RX ADMIN — GUAIFENESIN 1200 MG: 600 TABLET, EXTENDED RELEASE ORAL at 17:07

## 2021-09-04 RX ADMIN — INSULIN HUMAN 6 UNITS: 100 INJECTION, SOLUTION PARENTERAL at 12:22

## 2021-09-04 RX ADMIN — ASPIRIN 81 MG: 81 TABLET, COATED ORAL at 05:58

## 2021-09-04 RX ADMIN — LEVOTHYROXINE SODIUM 112 MCG: 0.11 TABLET ORAL at 05:57

## 2021-09-04 RX ADMIN — SODIUM CHLORIDE 3 G: 900 INJECTION INTRAVENOUS at 02:48

## 2021-09-04 RX ADMIN — SODIUM CHLORIDE 3 G: 900 INJECTION INTRAVENOUS at 20:56

## 2021-09-04 RX ADMIN — SODIUM CHLORIDE 3 G: 900 INJECTION INTRAVENOUS at 16:09

## 2021-09-04 RX ADMIN — SIMETHICONE 80 MG: 80 TABLET, CHEWABLE ORAL at 23:23

## 2021-09-04 RX ADMIN — GUAIFENESIN 1200 MG: 600 TABLET, EXTENDED RELEASE ORAL at 06:07

## 2021-09-04 RX ADMIN — IBUPROFEN 600 MG: 600 TABLET ORAL at 09:45

## 2021-09-04 RX ADMIN — LOVASTATIN 20 MG: 20 TABLET ORAL at 19:46

## 2021-09-04 RX ADMIN — FLUOXETINE 20 MG: 20 CAPSULE ORAL at 05:58

## 2021-09-04 RX ADMIN — ENALAPRILAT 1.25 MG: 1.25 INJECTION INTRAVENOUS at 22:38

## 2021-09-04 RX ADMIN — INSULIN HUMAN 4 UNITS: 100 INJECTION, SOLUTION PARENTERAL at 08:10

## 2021-09-04 RX ADMIN — MONTELUKAST 10 MG: 10 TABLET, FILM COATED ORAL at 05:58

## 2021-09-04 RX ADMIN — FLUTICASONE PROPIONATE 220 MCG: 110 AEROSOL, METERED RESPIRATORY (INHALATION) at 20:47

## 2021-09-04 RX ADMIN — NICOTINE 14 MG: 14 PATCH TRANSDERMAL at 05:59

## 2021-09-04 RX ADMIN — IBUPROFEN 600 MG: 600 TABLET ORAL at 19:46

## 2021-09-04 RX ADMIN — INSULIN HUMAN 8 UNITS: 100 INJECTION, SOLUTION PARENTERAL at 17:39

## 2021-09-04 RX ADMIN — IBUPROFEN 600 MG: 600 TABLET ORAL at 02:47

## 2021-09-04 RX ADMIN — ENOXAPARIN SODIUM 40 MG: 40 INJECTION SUBCUTANEOUS at 05:59

## 2021-09-04 RX ADMIN — TIOTROPIUM BROMIDE INHALATION SPRAY 5 MCG: 3.12 SPRAY, METERED RESPIRATORY (INHALATION) at 07:42

## 2021-09-04 RX ADMIN — PREDNISONE 20 MG: 20 TABLET ORAL at 05:58

## 2021-09-04 RX ADMIN — FLUTICASONE PROPIONATE 220 MCG: 110 AEROSOL, METERED RESPIRATORY (INHALATION) at 07:42

## 2021-09-04 RX ADMIN — CAPTOPRIL 12.5 MG: 12.5 TABLET ORAL at 05:58

## 2021-09-04 RX ADMIN — DOCUSATE SODIUM 50 MG AND SENNOSIDES 8.6 MG 2 TABLET: 8.6; 5 TABLET, FILM COATED ORAL at 17:08

## 2021-09-04 RX ADMIN — IPRATROPIUM BROMIDE AND ALBUTEROL SULFATE 3 ML: .5; 2.5 SOLUTION RESPIRATORY (INHALATION) at 09:27

## 2021-09-04 RX ADMIN — OMEPRAZOLE 20 MG: 20 CAPSULE, DELAYED RELEASE ORAL at 17:08

## 2021-09-04 RX ADMIN — CETIRIZINE HYDROCHLORIDE 10 MG: 10 TABLET, FILM COATED ORAL at 17:08

## 2021-09-04 RX ADMIN — ACETAMINOPHEN 650 MG: 325 TABLET, FILM COATED ORAL at 16:09

## 2021-09-04 RX ADMIN — DOCUSATE SODIUM 50 MG AND SENNOSIDES 8.6 MG 2 TABLET: 8.6; 5 TABLET, FILM COATED ORAL at 05:58

## 2021-09-04 RX ADMIN — CETIRIZINE HYDROCHLORIDE 10 MG: 10 TABLET, FILM COATED ORAL at 05:58

## 2021-09-04 RX ADMIN — OMEPRAZOLE 20 MG: 20 CAPSULE, DELAYED RELEASE ORAL at 05:57

## 2021-09-04 RX ADMIN — SODIUM CHLORIDE 3 G: 900 INJECTION INTRAVENOUS at 08:12

## 2021-09-04 ASSESSMENT — ENCOUNTER SYMPTOMS
WEAKNESS: 0
HEADACHES: 0
DIZZINESS: 0
CARDIOVASCULAR NEGATIVE: 1
DIARRHEA: 0
VOMITING: 0
SPUTUM PRODUCTION: 1
EYES NEGATIVE: 1
SHORTNESS OF BREATH: 1
GASTROINTESTINAL NEGATIVE: 1
CHILLS: 0
BLURRED VISION: 0
PSYCHIATRIC NEGATIVE: 1
FOCAL WEAKNESS: 0
PALPITATIONS: 0
MYALGIAS: 0
MUSCULOSKELETAL NEGATIVE: 1
DEPRESSION: 0
NEUROLOGICAL NEGATIVE: 1
FEVER: 0
DIAPHORESIS: 0
CONSTITUTIONAL NEGATIVE: 1
ABDOMINAL PAIN: 0
WEIGHT LOSS: 0
NAUSEA: 0
COUGH: 1
BRUISES/BLEEDS EASILY: 0
SORE THROAT: 0

## 2021-09-04 ASSESSMENT — PAIN DESCRIPTION - PAIN TYPE
TYPE: ACUTE PAIN

## 2021-09-04 ASSESSMENT — LIFESTYLE VARIABLES: SUBSTANCE_ABUSE: 0

## 2021-09-04 NOTE — PROGRESS NOTES
Hospital Medicine Daily Progress Note    Date of Service  9/4/2021    Chief Complaint  Michelle Espinoza is a 70 y.o. female admitted 8/31/2021 with shortness of breath, fevers and myalgias    Hospital Course  Patient is a 70 year old female with history of nicotine dependence, insulin dependent diabetes, copd with chronic hypoxic respiratory failure on 4L (5 with exertion at baseline), MICHAELA and unspecified psychiatric problem.  She presented to the ER with fevers and increasing sob.  She was found to have right sided pneumonia and was started on broad spectrum antibiotics. She was also thought to have copd exacerbation and was started on steroids for this.    Interval Problem Update  Axox3, again hypoglycemic this am, she reports she does not like the food and is eating less than normal, will decrease her long acting insulin to 38 units and continue following, she denies associated symptoms.  No pain, sob and cough continue to improve.  She remains stable on 3L (which is less than her baseline).  ROS otherwise negative. Discussed with ID and Dr. Llanes cardiology asked to eval for a MIMI.    I have personally seen and examined the patient at bedside. I discussed the plan of care with patient, nursing, cardiology and ID.    Consultants/Specialty  ID    Code Status  Full Code    Disposition  Patient is not medically cleared.   Anticipate discharge to home  I have placed the appropriate orders for post-discharge needs.    Review of Systems  Review of Systems   Constitutional: Negative.  Negative for chills, diaphoresis, fever, malaise/fatigue and weight loss.   HENT: Negative.  Negative for sore throat.    Eyes: Negative.  Negative for blurred vision.   Respiratory: Positive for cough, sputum production and shortness of breath.    Cardiovascular: Negative.  Negative for chest pain, palpitations and leg swelling.   Gastrointestinal: Negative.  Negative for abdominal pain, nausea and vomiting.   Genitourinary: Negative.   Negative for dysuria.   Musculoskeletal: Negative.  Negative for myalgias.   Skin: Negative.  Negative for itching and rash.   Neurological: Negative.  Negative for dizziness, focal weakness, weakness and headaches.   Endo/Heme/Allergies: Negative.  Does not bruise/bleed easily.   Psychiatric/Behavioral: Negative.  Negative for depression, substance abuse and suicidal ideas.   All other systems reviewed and are negative.       Physical Exam  Temp:  [36.1 °C (96.9 °F)-36.6 °C (97.9 °F)] 36.6 °C (97.9 °F)  Pulse:  [62-71] 71  Resp:  [18-19] 18  BP: (139-162)/(74-93) 160/82  SpO2:  [94 %-97 %] 94 %    Physical Exam  Vitals and nursing note reviewed. Exam conducted with a chaperone present.   Constitutional:       General: She is not in acute distress.     Appearance: Normal appearance. She is not diaphoretic.   HENT:      Head: Normocephalic.      Nose: Nose normal. No congestion.      Mouth/Throat:      Mouth: Mucous membranes are moist.      Pharynx: Oropharynx is clear. No oropharyngeal exudate.   Eyes:      General:         Right eye: No discharge.         Left eye: No discharge.      Pupils: Pupils are equal, round, and reactive to light.   Neck:      Vascular: No carotid bruit.   Cardiovascular:      Rate and Rhythm: Normal rate and regular rhythm.      Pulses: Normal pulses.      Heart sounds: Normal heart sounds. No murmur heard.     Pulmonary:      Effort: Pulmonary effort is normal. No respiratory distress.      Breath sounds: Normal breath sounds. No wheezing or rhonchi.   Abdominal:      General: Abdomen is flat. Bowel sounds are normal. There is no distension.      Palpations: Abdomen is soft. There is no mass.      Tenderness: There is no abdominal tenderness. There is no guarding.   Musculoskeletal:         General: No swelling, tenderness or deformity. Normal range of motion.      Cervical back: No rigidity or tenderness.   Lymphadenopathy:      Cervical: No cervical adenopathy.   Skin:     General:  Skin is warm and dry.      Capillary Refill: Capillary refill takes less than 2 seconds.      Coloration: Skin is not jaundiced or pale.      Findings: No bruising or erythema.   Neurological:      General: No focal deficit present.      Mental Status: She is alert and oriented to person, place, and time.      Cranial Nerves: No cranial nerve deficit.   Psychiatric:         Mood and Affect: Mood normal.         Behavior: Behavior normal.         Fluids    Intake/Output Summary (Last 24 hours) at 9/4/2021 1139  Last data filed at 9/4/2021 1052  Gross per 24 hour   Intake 240 ml   Output --   Net 240 ml       Laboratory  Recent Labs     09/02/21  0248 09/03/21  0319 09/04/21  0444   WBC 10.2 8.3 7.4   RBC 3.85* 3.90* 3.79*   HEMOGLOBIN 12.3 12.8 12.3   HEMATOCRIT 37.7 38.8 38.0   MCV 97.9* 99.5* 100.3*   MCH 31.9 32.8 32.5   MCHC 32.6* 33.0* 32.4*   RDW 47.9 49.8 49.3   PLATELETCT 119* 108* 137*   MPV 11.0 11.1 11.0                       Imaging  EC-ECHOCARDIOGRAM COMPLETE W/O CONT   Final Result      CT-CHEST,ABDOMEN,PELVIS WITH   Final Result         1. Small peripheral ill-defined wedge-shaped low attenuating areas throughout the liver could relate to small infarcts secondary to septic emboli.         2. Ill-defined decreased enhancement in the left kidney is nonspecific and could be seen with pyelonephritis. Renal infarct is in the differential but this appearance is not typical.      3. Hazy peripheral groundglass opacities throughout both lungs with interlobular septal thickening are nonspecific but could relate to infection as well.      DX-CHEST-PORTABLE (1 VIEW)   Final Result      Interstitial opacity compatible with underlying scarring      Some additional consolidative opacity right midlung zone could indicate bacterial or Covid pneumonia      Right apex subpleural opacity most likely from pleural-parenchymal scarring. Malignant nodule or infection cannot be excluded           Assessment/Plan  * Sepsis  ([facility])- (present on admission)  Assessment & Plan  This is Sepsis Present on admission  SIRS criteria identified on my evaluation include: Tachycardia, with heart rate greater than 90 BPM, Tachypnea, with respirations greater than 20 per minute and Leukocytosis, with WBC greater than 12,000  Source is Respiratory  Sepsis protocol initiated  IV antibiotics as appropriate for source of sepsis  While organ dysfunction may be noted elsewhere in this problem list or in the chart, degree of organ dysfunction does not meet CMS criteria for severe sepsis  Sputum culture/blood culture x2, mycoplasma PCR, urine/strep urinary antigen ordered and pending  IV antibiotics initiated in ED and will continue onward.  Procalcitonin ordered and pending.  Significant for middle lobar pneumonia, influenza A/B, RSV and Covid negative.  MRSA probe ordered and pending.    Empiric vanc stopped  Remains clinically stable but persistent ecoli bacteremia (pan sensitive)    Bacteremia  Assessment & Plan  Ecoli, pan sensitive but persistent  CT abdomen done - non specific findings, possibly reflective of septic emboli in the liver, possible findings suggestive of pyelonephritis and non specific pulmonary findings    TTE negative, MIMI requested, discussed with Dr. Llanes cardiology  ID following  Continue unasyn for now         MICHAELA (obstructive sleep apnea)  Assessment & Plan  Cpap ordered, pt having trouble tolerating hospital machine    Thrombocytopenia ([facility])- (present on admission)  Assessment & Plan  Mild  Improving  Monitor intermittently    COPD (chronic obstructive pulmonary disease) ([facility])- (present on admission)  Assessment & Plan  Continue supplemental oxygen to maintain SPO2 greater than 88%  Mild exacerbation  Completed steroid taper  Duo nebs as needed  resolving    Acute on chronic respiratory failure with hypoxia ([facility])- (present on admission)  Assessment & Plan  Continues to use tobacco however in agreement with tobacco cessation  protocol.  Maintain SPO2 greater than 88%  Continue steroid taper  Use 4 L at rest at home  Now remains stable on 3  Exam slowly improving  Continue Pep therapy  Chest x-ray reveals middle right lobar pneumonia  Influenza A/B, RSV and Covid negative  Clinically resolving    Tobacco abuse- (present on admission)  Assessment & Plan  6 minutes tobacco cessation counseling commenced at bedside.  Patient explained and educated the risks of smoking.  Education provided, in agreement with alternatives such as nicotine patch, Nicorette gum.  Strong recommendation for outpatient follow-up with smoking cessation program at discharge as patient still has urges to smoke however in agreement with cessation.    Obesity- (present on admission)  Assessment & Plan  Strong recommendation for outpatient follow-up with PCP for lifestyle modification including diet and exercise regimen of at least 30 minutes brisk cardiovascular exercise 3-5 times per week.    Hypothyroid- (present on admission)  Assessment & Plan  Continue Synthroid 112 mcg p.o. daily  Avoid calcium supplementation within 1 hour of administration    Uncontrolled type 1 diabetes mellitus (HCC)- (present on admission)  Assessment & Plan  Hemoglobin A1c of 6.1 as of July 2021  Continue long acting, SSI plus additional short acting with carb counting  Continue to follow  Hypoglycemia as noted above, decreasing long acting insulin  Discussed with nursing  Continue to follow       VTE prophylaxis: enoxaparin ppx    I have performed a physical exam and reviewed and updated ROS and Plan today (9/4/2021). In review of yesterday's note (9/3/2021), there are no changes except as documented above.

## 2021-09-04 NOTE — PROGRESS NOTES
Assumed care of patient at 0645. Bedside report received. Assessment complete.  AA&Ox4. Denies CP/SOB.  Reporting 3/10 pain. Declined intervention at this time.   Educated patient regarding pharmacologic and non pharmacologic modalities for pain management.  Skin per flow sheets.  Tolerating diet. Denies N/V.  + void. Last BM 9/3  Pt ambulates independently in room.  Plan of care discussed, all questions answered. Educated on the importance of calling before getting OOB and pt verbalizes understanding. Educated regarding importance of oral care. Oral care kit at bedside. Call light is within reach, treaded slipper socks on, bed in lowest/ locked position, hourly rounding in place, all needs met at this time

## 2021-09-04 NOTE — PROGRESS NOTES
Infectious Disease Progress Note    Author: David Iglesias M.D. Date & Time of service: 2021  10:56 AM    Chief Complaint:  Follow-up for E. coli bacteremia    Interval History:   patient is afebrile, white count 7.4, repeat blood cultures x2 again positive despite appropriate antibiotics    Labs Reviewed and Medications Reviewed.    Review of Systems:  Review of Systems   Constitutional: Negative for chills and fever.   Gastrointestinal: Negative for abdominal pain, diarrhea, nausea and vomiting.   Skin: Negative for itching and rash.   All other systems reviewed and are negative.      Hemodynamics:  Temp (24hrs), Av.3 °C (97.3 °F), Min:36.1 °C (96.9 °F), Max:36.6 °C (97.9 °F)  Temperature: 36.6 °C (97.9 °F)  Pulse  Av.8  Min: 62  Max: 117   Blood Pressure : 160/82       Physical Exam:  Physical Exam  Vitals and nursing note reviewed.   Constitutional:       General: She is not in acute distress.     Appearance: She is not toxic-appearing or diaphoretic.   HENT:      Mouth/Throat:      Pharynx: No oropharyngeal exudate.   Eyes:      General: No scleral icterus.        Right eye: No discharge.         Left eye: No discharge.      Conjunctiva/sclera: Conjunctivae normal.   Cardiovascular:      Rate and Rhythm: Normal rate.      Heart sounds: No murmur heard.     Pulmonary:      Effort: Pulmonary effort is normal. No respiratory distress.      Breath sounds: No stridor. No wheezing.   Abdominal:      General: There is no distension.      Tenderness: There is no abdominal tenderness.   Musculoskeletal:         General: No swelling or tenderness.      Cervical back: No rigidity.   Skin:     Findings: No erythema or rash.   Neurological:      General: No focal deficit present.      Mental Status: She is alert and oriented to person, place, and time.   Psychiatric:         Mood and Affect: Mood normal.         Behavior: Behavior normal.      Comments: Pleasant         Meds:    Current  Facility-Administered Medications:   •  [START ON 9/5/2021] insulin glargine  •  ipratropium-albuterol  •  omeprazole  •  cetirizine  •  captopril  •  mag hydrox-al hydrox-simeth  •  albuterol  •  aspirin EC  •  fluticasone  •  FLUoxetine  •  ipratropium-albuterol  •  levothyroxine  •  lovastatin  •  montelukast  •  raloxifene  •  senna-docusate **AND** polyethylene glycol/lytes **AND** magnesium hydroxide **AND** bisacodyl  •  enoxaparin  •  ampicillin-sulbactam (UNASYN) IV  •  guaiFENesin dextromethorphan  •  ondansetron  •  ondansetron  •  nicotine **AND** Nicotine Replacement Patient Education Materials **AND** nicotine polacrilex  •  tiotropium  •  ibuprofen  •  albuterol  •  Respiratory Therapy Consult  •  guaiFENesin ER  •  acetaminophen  •  insulin regular **AND** POC blood glucose manual result **AND** NOTIFY MD and PharmD **AND** glucose **AND** dextrose 50%    Labs:  Recent Labs     09/02/21  0248 09/03/21  0319 09/04/21  0444   WBC 10.2 8.3 7.4   RBC 3.85* 3.90* 3.79*   HEMOGLOBIN 12.3 12.8 12.3   HEMATOCRIT 37.7 38.8 38.0   MCV 97.9* 99.5* 100.3*   MCH 31.9 32.8 32.5   RDW 47.9 49.8 49.3   PLATELETCT 119* 108* 137*   MPV 11.0 11.1 11.0   NEUTSPOLYS 85.20* 71.40 71.30   LYMPHOCYTES 8.80* 16.00* 17.10*   MONOCYTES 5.10 11.50 9.10   EOSINOPHILS 0.10 0.40 0.90   BASOPHILS 0.40 0.10 0.30     No results for input(s): SODIUM, POTASSIUM, CHLORIDE, CO2, GLUCOSE, BUN, CPKTOTAL in the last 72 hours.  No results for input(s): ALBUMIN, TBILIRUBIN, ALKPHOSPHAT, TOTPROTEIN, ALTSGPT, ASTSGOT, CREATININE in the last 72 hours.    Imaging:  CT-CHEST,ABDOMEN,PELVIS WITH    Result Date: 9/2/2021 9/2/2021 5:17 PM HISTORY/REASON FOR EXAM:  Abdominal pain, fever; evaluate for intra-abdominal abscess or infection in RUQ or LUQ (regions tender to palpation). e coli bacteremia.. TECHNIQUE/EXAM DESCRIPTION: CT scan of the chest, abdomen and pelvis with contrast. Thin-section helical scanning was obtained with intravenous contrast  from the lung apices through the pubic symphysis to include the chest, abdomen and pelvis. 80 mL of Omnipaque 350 nonionic contrast was administered intravenously without complication. Low dose optimization technique was utilized for this CT exam including automated exposure control and adjustment of the mA and/or kV according to patient size. COMPARISON: None. FINDINGS: CT Chest: Lungs: Hazy peripheral groundglass opacities throughout both lungs with interlobular septal thickening. Mediastinum/Marisabel: No significant adenopathy. Pleura: No pleural effusion. Cardiac: Heart normal in size without pericardial effusion. Vascular: No central pulmonary embolus. Soft tissues: Unremarkable. Bones: No acute or destructive process. Small hiatal hernia. CT Abdomen and Pelvis: Liver: Ill-defined peripheral wedge-shaped low attenuating areas throughout the liver. Spleen: Unremarkable. Pancreas: Unremarkable. Gallbladder: No calcified stones. Biliary: Nondilated. Adrenal glands: Normal. Kidneys: Ill-defined decreased enhancement in the left kidney. No hydronephrosis. Bowel: No obstruction or acute inflammation. Lymph nodes: No adenopathy. Vasculature: Unremarkable. Peritoneum: Unremarkable without ascites. Musculoskeletal: Moderate degenerative change of the lumbar spine. Pelvis: Limited visualization of the pelvis due to streak artifact from left hip prosthesis. No obvious fluid collection identified.     1. Small peripheral ill-defined wedge-shaped low attenuating areas throughout the liver could relate to small infarcts secondary to septic emboli. 2. Ill-defined decreased enhancement in the left kidney is nonspecific and could be seen with pyelonephritis. Renal infarct is in the differential but this appearance is not typical. 3. Hazy peripheral groundglass opacities throughout both lungs with interlobular septal thickening are nonspecific but could relate to infection as well.    DX-CHEST-PORTABLE (1 VIEW)    Result Date:  2021 12:54 AM HISTORY/REASON FOR EXAM: Shortness of Breath. TECHNIQUE/EXAM DESCRIPTION AND NUMBER OF VIEWS: Single AP view of the chest. COMPARISON: September 15, 2018 FINDINGS: Hardware: No change. Axillary and chest wall vascular clips bilaterally Lungs: Reticular pulmonary opacity again seen. Some new ill-defined right midlung zone opacity. Subpleural right apical opacity is seen. Pleura:  No pleural space process is seen. Heart and mediastinum: There is mild cardiac silhouette enlargement.     Interstitial opacity compatible with underlying scarring Some additional consolidative opacity right midlung zone could indicate bacterial or Covid pneumonia Right apex subpleural opacity most likely from pleural-parenchymal scarring. Malignant nodule or infection cannot be excluded    EC-ECHOCARDIOGRAM COMPLETE W/O CONT    Result Date: 9/3/2021  Transthoracic Echo Report Echocardiography Laboratory CONCLUSIONS No prior study is available for comparison. Left ventricular ejection fraction is visually estimated to be 60%. Normal diastolic function. Normal right ventricular size and systolic function. ANILA VALENTINO Exam Date:         2021                    15:03 Exam Location:     Inpatient Priority:          Routine Ordering Physician:        PAPITO KEANE Referring Physician:       [id number] Booker Sonographer:               Mone Medel [facility] Age:    70     Gender:    F MRN:    [id number] :    1950 BSA:    2.17   Ht (in):    68     Wt (lb):    231 Exam Type:     Complete Indications:     Endocarditis ICD Codes:       421 CPT Codes:       64640 BP:   128    /   74     HR:   61 Technical Quality:       Fair MEASUREMENTS  (Male / Female) Normal Values 2D ECHO LV Diastolic Diameter PLAX        4.4 cm                4.2 - 5.9 / 3.9 - 5.3 cm LV Systolic Diameter PLAX         3.3 cm                2.1 - 4.0 cm IVS Diastolic Thickness           1.1 cm                LVPW Diastolic Thickness          1.1  cm                LVOT Diameter                     2.2 cm                Estimated LV Ejection Fraction    60 %                  LV Ejection Fraction MOD BP       75.5 %                >= 55  % LV Ejection Fraction MOD 4C       65.9 %                LV Ejection Fraction MOD 2C       79.6 %                IVC Diameter                      1.6 cm                DOPPLER AV Peak Velocity                  1.3 m/s               AV Peak Gradient                  7.1 mmHg              AV Mean Gradient                  4.1 mmHg              LVOT Peak Velocity                0.98 m/s              AV Area Cont Eq vti               2.8 cm2               Mitral E Point Velocity           0.9 m/s               Mitral E to A Ratio               1.2                   MV Pressure Half Time             63.8 ms               MV Area PHT                       3.4 cm2               MV Deceleration Time              220 ms                * Indicates values subject to auto-interpretation LV EF:  60    % FINDINGS Left Ventricle Normal left ventricular size and systolic function. Mild concentric left ventricular hypertrophy. Normal regional wall motion. Left ventricular ejection fraction is visually estimated to be 60%. Normal diastolic function. Right Ventricle Normal right ventricular size and systolic function. Right Atrium Normal right atrial size. Normal inferior vena cava size and inspiratory collapse. Left Atrium Normal left atrial size. Left atrial volume index is 24  mL/sq m. Mitral Valve The mitral valve is not well visualized. No stenosis or regurgitation seen. Aortic Valve The aortic valve is not well visualized. No stenosis or regurgitation seen. Tricuspid Valve The tricuspid valve is not well visualized. No stenosis or regurgitation seen. Right atrial pressure is estimated to be 3 mmHg. Unable to estimate pulmonary artery pressure due to an inadequate tricuspid regurgitant jet. Pulmonic Valve The pulmonic valve is not well  "visualized. Pericardium No pericardial effusion seen. Aorta The aortic root is normal.  Ascending aorta not well visualized. Frank Allan MD (Electronically Signed) Final Date:     03 September 2021                 16:23      Micro:  Results     Procedure Component Value Units Date/Time    BLOOD CULTURE [[id number]]  (Abnormal) Collected: 09/01/21 1347    Order Status: Completed Specimen: Blood from Peripheral Updated: 09/04/21 0902     Significant Indicator POS     Source BLD     Site PERIPHERAL     Culture Result Growth detected by Bactec instrument. 09/02/2021  07:26      Escherichia coli  See previous culture for sensitivity report.      Narrative:      CALL  Lane  141 tel. [phone],  CALLED  141 tel. [phone] 09/02/2021, 07:26, RB PERF. RESULTS CALLED  TO:[phone] PRISCILLA Alvarez  Per Hospital Policy: Only change Specimen Src: to \"Line\" if  specified by physician order.  Left Hand    BLOOD CULTURE [[id number]]  (Abnormal)  (Susceptibility) Collected: 09/01/21 1347    Order Status: Completed Specimen: Blood from Peripheral Updated: 09/04/21 0902     Significant Indicator POS     Source BLD     Site PERIPHERAL     Culture Result Growth detected by Bactec instrument. 09/02/2021  06:22      Escherichia coli    Narrative:      CALL  Lane  141 tel. [phone],  CALLED  141 tel. [phone] 09/02/2021, 06:25, RB PERF. RESULTS CALLED TO:  [phone], RN  Per Hospital Policy: Only change Specimen Src: to \"Line\" if  specified by physician order.  Left Forearm/Arm    Susceptibility     Escherichia coli (1)     Antibiotic Interpretation Microscan Method Status    Ampicillin Sensitive <=8 mcg/mL KALYN Final    Ceftriaxone Sensitive <=1 mcg/mL KALYN Final    Cefazolin Sensitive <=2 mcg/mL KALYN Final    Ciprofloxacin Sensitive <=0.25 mcg/mL KALYN Final    Cefepime Sensitive <=2 mcg/mL KALYN Final    Cefuroxime Sensitive <=4 mcg/mL KALYN Final    Ampicillin/sulbactam Sensitive <=4/2 mcg/mL KALYN Final    Tobramycin Sensitive <=2 mcg/mL KALYN " "Final    Ertapenem Sensitive <=0.5 mcg/mL KALYN Final    Gentamicin Sensitive <=2 mcg/mL KALYN Final    Moxifloxacin Sensitive <=2 mcg/mL KALYN Final    Pip/Tazobactam Sensitive <=8 mcg/mL KALYN Final    Trimeth/Sulfa Sensitive <=0.5/9.5 mcg/mL KALYN Final    Tigecycline Sensitive <=2 mcg/mL KALYN Final                   BLOOD CULTURE [[id number]]  (Abnormal) Collected: 09/03/21 0319    Order Status: Completed Specimen: Blood from Peripheral Updated: 09/04/21 0858     Significant Indicator POS     Source BLD     Site PERIPHERAL     Culture Result Growth detected by Bactec instrument. 09/04/2021  08:57  Gram Stain: Gram negative rods.      Narrative:      CALL  Lane  141 tel. [phone],  CALLED  141 tel. [phone] 09/04/2021, 08:58, RB PERF. RESULTS CALLED  TO:[phone]  Per Hospital Policy: Only change Specimen Src: to \"Line\" if  specified by physician order.  Right AC    BLOOD CULTURE [[id number]] Collected: 09/03/21 0319    Order Status: Completed Specimen: Blood from Peripheral Updated: 09/04/21 0648     Significant Indicator NEG     Source BLD     Site PERIPHERAL     Culture Result No Growth  Note: Blood cultures are incubated for 5 days and  are monitored continuously.Positive blood cultures  are called to the RN and reported as soon as  they are identified.      Narrative:      Per Hospital Policy: Only change Specimen Src: to \"Line\" if  specified by physician order.  Right Wrist    URINE CULTURE-EXISTING-LESS THAN 48 HOURS [[id number]] Collected: 08/31/21 0634    Order Status: Completed Specimen: Urine, Clean Catch Updated: 09/03/21 1018     Significant Indicator NEG     Source UR     Site URINE, CLEAN CATCH     Culture Result Usual urogenital sammie ,000 cfu/mL    Narrative:      Please obtain from prior Urine sample  Indication for culture:->New onset fever with no other  identified cause  Please obtain from prior Urine sample    BLOOD CULTURE [[id number]]  (Abnormal) Collected: 08/31/21 0230    Order Status: Completed " "Specimen: Blood from Peripheral Updated: 09/02/21 1017     Significant Indicator POS     Source BLD     Site PERIPHERAL     Culture Result Growth detected by Bactec instrument. 08/31/2021  15:42      Escherichia coli  See previous culture for sensitivity report.      Narrative:      CALL  Lane  141 tel. [phone],  CALLED  141 tel. [phone] 08/31/2021, 15:43, RB PERF. RESULTS CALLED  TO:[phone] RN  Per Hospital Policy: Only change Specimen Src: to \"Line\" if  specified by physician order.  No site indicated    BLOOD CULTURE [[id number]]  (Abnormal)  (Susceptibility) Collected: 08/31/21 0220    Order Status: Completed Specimen: Blood from Peripheral Updated: 09/02/21 1015     Significant Indicator POS     Source BLD     Site PERIPHERAL     Culture Result Growth detected by Bactec instrument. 08/31/2021  15:40      Escherichia coli    Narrative:      CALL  Lane  141 tel. [phone],  CALLED  141 tel. [phone] 08/31/2021, 15:42, RB PERF. RESULTS CALLED  TO:[phone] RN  Per Hospital Policy: Only change Specimen Src: to \"Line\" if  specified by physician order.  No site indicated    Susceptibility     Escherichia coli (1)     Antibiotic Interpretation Microscan Method Status    Ampicillin Sensitive <=8 mcg/mL KALYN Final    Ceftriaxone Sensitive <=1 mcg/mL KALYN Final    Cefazolin Sensitive <=2 mcg/mL KALYN Final    Ciprofloxacin Sensitive <=0.25 mcg/mL KALYN Final    Cefepime Sensitive <=2 mcg/mL KALYN Final    Cefuroxime Sensitive <=4 mcg/mL KALYN Final    Ampicillin/sulbactam Sensitive <=4/2 mcg/mL KALYN Final    Tobramycin Sensitive <=2 mcg/mL KALYN Final    Ertapenem Sensitive <=0.5 mcg/mL KALYN Final    Gentamicin Sensitive <=2 mcg/mL KALYN Final    Moxifloxacin Sensitive <=2 mcg/mL KALYN Final    Pip/Tazobactam Sensitive <=8 mcg/mL KALYN Final    Trimeth/Sulfa Sensitive <=0.5/9.5 mcg/mL KALYN Final    Tigecycline Sensitive <=2 mcg/mL KALYN Final                   MRSA By PCR (Amp) [[id number]] Collected: 08/31/21 1709    Order Status: Completed " "Specimen: Respirate Updated: 08/31/21 2104     Significant Indicator NEG     Source RESP     Site Nares     MRSA PCR Negative for MRSA by PCR.    Urinalysis [[id number]]  (Abnormal) Collected: 08/31/21 0604    Order Status: Completed Specimen: Urine, Clean Catch Updated: 08/31/21 0626     Color DK Yellow     Character Turbid     Specific Gravity 1.020     Ph 5.5     Glucose Negative mg/dL      Ketones Trace mg/dL      Protein 100 mg/dL      Bilirubin Negative     Urobilinogen, Urine 1.0     Nitrite Positive     Leukocyte Esterase Large     Occult Blood Moderate     Micro Urine Req Microscopic    Narrative:      If not done within the last 24 hours    MRSA By PCR (Amp) [[id number]]     Order Status: Canceled Specimen: Sputum from Nares     COV-2, FLU A/B, AND RSV BY PCR (2-4 HOURS CEPHEID): Collect NP swab in VTM [[id number]] Collected: 08/31/21 0132    Order Status: Completed Specimen: Respirate from Nasopharyngeal Updated: 08/31/21 0312     Influenza virus A RNA Negative     Influenza virus B, PCR Negative     RSV, PCR Negative     SARS-CoV-2 by PCR NotDetected     Comment: PATIENTS: Important information regarding your results and instructions can  be found at [email or web address]   \"After your  Covid-19 Test\"    [facility] providers: PLEASE REFER TO DE-ESCALATION AND RETESTING PROTOCOL  on [email or web address]    **The [facility] Gene[employer]pert Xpress SARS-CoV-2 RT-PCR Test has been made  available for use under the Emergency Use Authorization (EUA) only.          SARS-CoV-2 Source NP Swab    Narrative:      Have you been in close contact with a person who is suspected  or known to be positive for COVID-19 within the last 30 days  (e.g. last seen that person < 30 days ago)->Unknown    Culture Respiratory W/ Grm Stn [[id number]] Collected: 08/31/21 0000    Order Status: Canceled Specimen: Sputum           Assessment:  70-year-old female patient with chronic hypoxemic respiratory failure on 4 L oxygen at " home, type 1 diabetes, obesity, MICHAELA, presented with cough and shortness of breath, was found to have sepsis with E. coli bacteremia.  The radiologist did interpret a possible right middle lobe pneumonia.  However, despite being on appropriate antibiotics, repeat blood cultures x2 were positive for pansensitive E. coli which is unusual in the absence of source control issues.  Patient does have a right upper quadrant tenderness on exam.    Pertinent Diagnoses:  E. coli bacteremia, persistent  Type 1 diabetes  Chronic hypoxemic respiratory failure  MICHAELA  Obesity    Plan:  -Continue IV Unasyn 3 g every 6 hours  -Blood cultures positive from 8/31, 9/1, 9/3  -CT chest abdomen pelvis with no source control issues.  Noted multiple small wedge-shaped lesions in the liver concerning for hepatic infarcts from septic embolization  -TTE with no obvious valvular vegetations, recommend MIMI.  Gram-negative infective endocarditis is rare but high suspicion in this setting and will significantly alter management    Discussed with internal medicine, Dr. Jarquin    ID will follow.  Please call with questions.

## 2021-09-04 NOTE — PROGRESS NOTES
Received report from previous shift RN  Assessment complete.  A&O x 4. Patient calls appropriately.  Patient ambulates with no assist.    Patient has 3/10 pain. Pain managed with rest.  Denies N&V. Tolerating cardiac, CHO diet.  + void, + flatus, 9/2 BM.  Patient denies SOB.    Pt's FSBS of 66. Pt asymptomatic for hypoglycemia. Pt refusing glucose tablets, opting for snack instead. Pt given orange juice, milk, and pudding.    Review plan with of care with patient. Call light and personal belongings with in reach. Hourly rounding in place. All needs met at this time.

## 2021-09-04 NOTE — CARE PLAN
The patient is Stable - Low risk of patient condition declining or worsening    Shift Goals  Clinical Goals: pain management, blood sugar management    Progress made toward(s) clinical / shift goals:    Problem: Knowledge Deficit - Standard  Goal: Patient and family/care givers will demonstrate understanding of plan of care, disease process/condition, diagnostic tests and medications  Outcome: Progressing     Problem: Self Care  Goal: Patient will have the ability to perform ADLs independently or with assistance (bathe, groom, dress, toilet and feed)  Outcome: Progressing     Problem: Respiratory  Goal: Patient will achieve/maintain optimum respiratory ventilation and gas exchange  Outcome: Progressing     Problem: Pain - Standard  Goal: Alleviation of pain or a reduction in pain to the patient’s comfort goal  Outcome: Progressing       Patient is not progressing towards the following goals:

## 2021-09-04 NOTE — CARE PLAN
Problem: Knowledge Deficit - Standard  Goal: Patient and family/care givers will demonstrate understanding of plan of care, disease process/condition, diagnostic tests and medications  Outcome: Progressing  Note: Reviewed POC with patient. All questions and concerns addressed at this time.       Problem: Self Care  Goal: Patient will have the ability to perform ADLs independently or with assistance (bathe, groom, dress, toilet and feed)  Outcome: Progressing  Note: Pt encouraged to perform ADL's independently.      Problem: Respiratory  Goal: Patient will achieve/maintain optimum respiratory ventilation and gas exchange  Outcome: Progressing  Note: Encouraged IS use 10x/hour. Supplemental O2 use per flowsheets     Problem: Pain - Standard  Goal: Alleviation of pain or a reduction in pain to the patient’s comfort goal  Outcome: Progressing  Note: Education provided regarding non pharmacologic and pharmacologic modalities for pain management. Medications administered per MAR. Patient educated regarding medications and side effects. Education provided regarding pain management and pain rating scale.       The patient is Stable - Low risk of patient condition declining or worsening    Shift Goals  Clinical Goals: OOB activity, pain management  Patient Goals: oob activity    Progress made toward(s) clinical / shift goals:  patient ambulated independently in room. Pain managed per MAR.    Patient is not progressing towards the following goals:

## 2021-09-04 NOTE — PROGRESS NOTES
Pt's FSBS 55. Pt refusing glucose tablets at this time, given orange juice to increase blood sugars instead.

## 2021-09-05 ENCOUNTER — APPOINTMENT (OUTPATIENT)
Dept: CARDIOLOGY | Facility: MEDICAL CENTER | Age: 71
DRG: 871 | End: 2021-09-05
Attending: INTERNAL MEDICINE
Payer: MEDICARE

## 2021-09-05 ENCOUNTER — APPOINTMENT (OUTPATIENT)
Dept: CARDIOLOGY | Facility: MEDICAL CENTER | Age: 71
DRG: 871 | End: 2021-09-05
Attending: NURSE PRACTITIONER
Payer: MEDICARE

## 2021-09-05 LAB
BACTERIA BLD CULT: ABNORMAL
BACTERIA BLD CULT: ABNORMAL
BASOPHILS # BLD AUTO: 0.4 % (ref 0–1.8)
BASOPHILS # BLD: 0.04 K/UL (ref 0–0.12)
EOSINOPHIL # BLD AUTO: 0.11 K/UL (ref 0–0.51)
EOSINOPHIL NFR BLD: 1.1 % (ref 0–6.9)
ERYTHROCYTE [DISTWIDTH] IN BLOOD BY AUTOMATED COUNT: 47 FL (ref 35.9–50)
GLUCOSE BLD-MCNC: 109 MG/DL (ref 65–99)
GLUCOSE BLD-MCNC: 142 MG/DL (ref 65–99)
GLUCOSE BLD-MCNC: 172 MG/DL (ref 65–99)
GLUCOSE BLD-MCNC: 182 MG/DL (ref 65–99)
GLUCOSE BLD-MCNC: 183 MG/DL (ref 65–99)
GLUCOSE BLD-MCNC: 49 MG/DL (ref 65–99)
GLUCOSE BLD-MCNC: 52 MG/DL (ref 65–99)
GLUCOSE BLD-MCNC: 83 MG/DL (ref 65–99)
HCT VFR BLD AUTO: 41.8 % (ref 37–47)
HGB BLD-MCNC: 13.7 G/DL (ref 12–16)
IMM GRANULOCYTES # BLD AUTO: 0.15 K/UL (ref 0–0.11)
IMM GRANULOCYTES NFR BLD AUTO: 1.4 % (ref 0–0.9)
LV EJECT FRACT  99904: 60
LYMPHOCYTES # BLD AUTO: 2.44 K/UL (ref 1–4.8)
LYMPHOCYTES NFR BLD: 23.4 % (ref 22–41)
MCH RBC QN AUTO: 32.2 PG (ref 27–33)
MCHC RBC AUTO-ENTMCNC: 32.8 G/DL (ref 33.6–35)
MCV RBC AUTO: 98.4 FL (ref 81.4–97.8)
MONOCYTES # BLD AUTO: 0.76 K/UL (ref 0–0.85)
MONOCYTES NFR BLD AUTO: 7.3 % (ref 0–13.4)
NEUTROPHILS # BLD AUTO: 6.93 K/UL (ref 2–7.15)
NEUTROPHILS NFR BLD: 66.4 % (ref 44–72)
NRBC # BLD AUTO: 0 K/UL
NRBC BLD-RTO: 0 /100 WBC
PLATELET # BLD AUTO: 217 K/UL (ref 164–446)
PMV BLD AUTO: 11.2 FL (ref 9–12.9)
RBC # BLD AUTO: 4.25 M/UL (ref 4.2–5.4)
SIGNIFICANT IND 70042: ABNORMAL
SITE SITE: ABNORMAL
SOURCE SOURCE: ABNORMAL
WBC # BLD AUTO: 10.4 K/UL (ref 4.8–10.8)

## 2021-09-05 PROCEDURE — 99232 SBSQ HOSP IP/OBS MODERATE 35: CPT | Performed by: INTERNAL MEDICINE

## 2021-09-05 PROCEDURE — 700105 HCHG RX REV CODE 258: Performed by: INTERNAL MEDICINE

## 2021-09-05 PROCEDURE — 93325 DOPPLER ECHO COLOR FLOW MAPG: CPT

## 2021-09-05 PROCEDURE — 94760 N-INVAS EAR/PLS OXIMETRY 1: CPT

## 2021-09-05 PROCEDURE — 82962 GLUCOSE BLOOD TEST: CPT | Mod: 91

## 2021-09-05 PROCEDURE — A9270 NON-COVERED ITEM OR SERVICE: HCPCS | Performed by: HOSPITALIST

## 2021-09-05 PROCEDURE — 700102 HCHG RX REV CODE 250 W/ 637 OVERRIDE(OP): Performed by: HOSPITALIST

## 2021-09-05 PROCEDURE — 93325 DOPPLER ECHO COLOR FLOW MAPG: CPT | Mod: 26 | Performed by: INTERNAL MEDICINE

## 2021-09-05 PROCEDURE — 700111 HCHG RX REV CODE 636 W/ 250 OVERRIDE (IP): Performed by: STUDENT IN AN ORGANIZED HEALTH CARE EDUCATION/TRAINING PROGRAM

## 2021-09-05 PROCEDURE — 700101 HCHG RX REV CODE 250: Performed by: HOSPITALIST

## 2021-09-05 PROCEDURE — 700111 HCHG RX REV CODE 636 W/ 250 OVERRIDE (IP): Performed by: NURSE PRACTITIONER

## 2021-09-05 PROCEDURE — 700102 HCHG RX REV CODE 250 W/ 637 OVERRIDE(OP): Performed by: STUDENT IN AN ORGANIZED HEALTH CARE EDUCATION/TRAINING PROGRAM

## 2021-09-05 PROCEDURE — A9270 NON-COVERED ITEM OR SERVICE: HCPCS | Performed by: INTERNAL MEDICINE

## 2021-09-05 PROCEDURE — 94640 AIRWAY INHALATION TREATMENT: CPT

## 2021-09-05 PROCEDURE — 770006 HCHG ROOM/CARE - MED/SURG/GYN SEMI*

## 2021-09-05 PROCEDURE — 700111 HCHG RX REV CODE 636 W/ 250 OVERRIDE (IP): Performed by: INTERNAL MEDICINE

## 2021-09-05 PROCEDURE — 85025 COMPLETE CBC W/AUTO DIFF WBC: CPT

## 2021-09-05 PROCEDURE — 36415 COLL VENOUS BLD VENIPUNCTURE: CPT

## 2021-09-05 PROCEDURE — 93312 ECHO TRANSESOPHAGEAL: CPT | Mod: 26 | Performed by: INTERNAL MEDICINE

## 2021-09-05 PROCEDURE — A9270 NON-COVERED ITEM OR SERVICE: HCPCS | Performed by: STUDENT IN AN ORGANIZED HEALTH CARE EDUCATION/TRAINING PROGRAM

## 2021-09-05 PROCEDURE — 700102 HCHG RX REV CODE 250 W/ 637 OVERRIDE(OP): Performed by: INTERNAL MEDICINE

## 2021-09-05 RX ORDER — TRAMADOL HYDROCHLORIDE 50 MG/1
50 TABLET ORAL EVERY 6 HOURS PRN
Status: DISCONTINUED | OUTPATIENT
Start: 2021-09-05 | End: 2021-09-08 | Stop reason: HOSPADM

## 2021-09-05 RX ORDER — BUDESONIDE AND FORMOTEROL FUMARATE DIHYDRATE 80; 4.5 UG/1; UG/1
2 AEROSOL RESPIRATORY (INHALATION)
Status: DISCONTINUED | OUTPATIENT
Start: 2021-09-05 | End: 2021-09-08 | Stop reason: HOSPADM

## 2021-09-05 RX ORDER — ALBUTEROL SULFATE 90 UG/1
1-2 AEROSOL, METERED RESPIRATORY (INHALATION)
Status: DISCONTINUED | OUTPATIENT
Start: 2021-09-05 | End: 2021-09-08 | Stop reason: HOSPADM

## 2021-09-05 RX ORDER — MIDAZOLAM HYDROCHLORIDE 1 MG/ML
5 INJECTION INTRAMUSCULAR; INTRAVENOUS ONCE
Status: COMPLETED | OUTPATIENT
Start: 2021-09-05 | End: 2021-09-05

## 2021-09-05 RX ADMIN — GUAIFENESIN 1200 MG: 600 TABLET, EXTENDED RELEASE ORAL at 18:00

## 2021-09-05 RX ADMIN — ASPIRIN 81 MG: 81 TABLET, COATED ORAL at 05:01

## 2021-09-05 RX ADMIN — LOVASTATIN 20 MG: 20 TABLET ORAL at 21:07

## 2021-09-05 RX ADMIN — MONTELUKAST 10 MG: 10 TABLET, FILM COATED ORAL at 05:00

## 2021-09-05 RX ADMIN — TRAMADOL HYDROCHLORIDE 50 MG: 50 TABLET, FILM COATED ORAL at 21:07

## 2021-09-05 RX ADMIN — CETIRIZINE HYDROCHLORIDE 10 MG: 10 TABLET, FILM COATED ORAL at 05:01

## 2021-09-05 RX ADMIN — NICOTINE 14 MG: 14 PATCH TRANSDERMAL at 05:01

## 2021-09-05 RX ADMIN — MIDAZOLAM HYDROCHLORIDE 4 MG: 1 INJECTION, SOLUTION INTRAMUSCULAR; INTRAVENOUS at 10:12

## 2021-09-05 RX ADMIN — FLUOXETINE 20 MG: 20 CAPSULE ORAL at 05:00

## 2021-09-05 RX ADMIN — GUAIFENESIN AND DEXTROMETHORPHAN 10 ML: 100; 10 SYRUP ORAL at 16:42

## 2021-09-05 RX ADMIN — ACETAMINOPHEN 650 MG: 325 TABLET, FILM COATED ORAL at 21:07

## 2021-09-05 RX ADMIN — FLUTICASONE PROPIONATE 220 MCG: 110 AEROSOL, METERED RESPIRATORY (INHALATION) at 09:17

## 2021-09-05 RX ADMIN — DEXTROSE MONOHYDRATE 50 ML: 25 INJECTION, SOLUTION INTRAVENOUS at 03:09

## 2021-09-05 RX ADMIN — SODIUM CHLORIDE 3 G: 900 INJECTION INTRAVENOUS at 03:22

## 2021-09-05 RX ADMIN — FENTANYL CITRATE 50 MCG: 50 INJECTION, SOLUTION INTRAMUSCULAR; INTRAVENOUS at 10:13

## 2021-09-05 RX ADMIN — BUDESONIDE AND FORMOTEROL FUMARATE DIHYDRATE 2 PUFF: 80; 4.5 AEROSOL RESPIRATORY (INHALATION) at 21:07

## 2021-09-05 RX ADMIN — ONDANSETRON 4 MG: 2 INJECTION INTRAMUSCULAR; INTRAVENOUS at 10:04

## 2021-09-05 RX ADMIN — SODIUM CHLORIDE 3 G: 900 INJECTION INTRAVENOUS at 21:07

## 2021-09-05 RX ADMIN — OMEPRAZOLE 20 MG: 20 CAPSULE, DELAYED RELEASE ORAL at 05:01

## 2021-09-05 RX ADMIN — IBUPROFEN 600 MG: 600 TABLET ORAL at 05:03

## 2021-09-05 RX ADMIN — GUAIFENESIN 1200 MG: 600 TABLET, EXTENDED RELEASE ORAL at 05:01

## 2021-09-05 RX ADMIN — RALOXIFENE 60 MG: 60 TABLET ORAL at 05:00

## 2021-09-05 RX ADMIN — SODIUM CHLORIDE 3 G: 900 INJECTION INTRAVENOUS at 14:37

## 2021-09-05 RX ADMIN — ENOXAPARIN SODIUM 40 MG: 40 INJECTION SUBCUTANEOUS at 05:02

## 2021-09-05 RX ADMIN — BUDESONIDE AND FORMOTEROL FUMARATE DIHYDRATE 2 PUFF: 80; 4.5 AEROSOL RESPIRATORY (INHALATION) at 13:27

## 2021-09-05 RX ADMIN — DEXTROSE MONOHYDRATE 50 ML: 25 INJECTION, SOLUTION INTRAVENOUS at 09:21

## 2021-09-05 RX ADMIN — TIOTROPIUM BROMIDE INHALATION SPRAY 5 MCG: 3.12 SPRAY, METERED RESPIRATORY (INHALATION) at 09:15

## 2021-09-05 RX ADMIN — CAPTOPRIL 12.5 MG: 12.5 TABLET ORAL at 05:00

## 2021-09-05 RX ADMIN — LEVOTHYROXINE SODIUM 112 MCG: 0.11 TABLET ORAL at 05:00

## 2021-09-05 RX ADMIN — DOCUSATE SODIUM 50 MG AND SENNOSIDES 8.6 MG 2 TABLET: 8.6; 5 TABLET, FILM COATED ORAL at 16:42

## 2021-09-05 RX ADMIN — SODIUM CHLORIDE 3 G: 900 INJECTION INTRAVENOUS at 09:28

## 2021-09-05 RX ADMIN — TRAMADOL HYDROCHLORIDE 50 MG: 50 TABLET, FILM COATED ORAL at 14:35

## 2021-09-05 RX ADMIN — CETIRIZINE HYDROCHLORIDE 10 MG: 10 TABLET, FILM COATED ORAL at 16:43

## 2021-09-05 RX ADMIN — OMEPRAZOLE 20 MG: 20 CAPSULE, DELAYED RELEASE ORAL at 16:42

## 2021-09-05 ASSESSMENT — ENCOUNTER SYMPTOMS
MUSCULOSKELETAL NEGATIVE: 1
CHILLS: 0
WHEEZING: 1
PSYCHIATRIC NEGATIVE: 1
FEVER: 0
WEIGHT LOSS: 0
COUGH: 1
CARDIOVASCULAR NEGATIVE: 1
GASTROINTESTINAL NEGATIVE: 1
SHORTNESS OF BREATH: 1
NEUROLOGICAL NEGATIVE: 1

## 2021-09-05 ASSESSMENT — PAIN DESCRIPTION - PAIN TYPE
TYPE: CHRONIC PAIN
TYPE: CHRONIC PAIN
TYPE: ACUTE PAIN

## 2021-09-05 NOTE — CARE PLAN
The patient is Stable - Low risk of patient condition declining or worsening    Shift Goals  Clinical Goals: pain management, blood sugar management  Patient Goals: oob activity    Progress made toward(s) clinical / shift goals:    Problem: Knowledge Deficit - Standard  Goal: Patient and family/care givers will demonstrate understanding of plan of care, disease process/condition, diagnostic tests and medications  9/5/2021 0002 by Adrianna Fisher RClaudioN.  Outcome: Progressing  9/5/2021 0002 by Adrianna Fisher R.N.  Outcome: Progressing     Problem: Impaired Gas Exchange  Goal: Patient will demonstrate improved ventilation and adequate oxygenation and participate in treatment regimen within the level of ability/situation.  Outcome: Progressing     Problem: Hemodynamics  Goal: Patient's hemodynamics, fluid balance and neurologic status will be stable or improve  Outcome: Progressing     Problem: Respiratory  Goal: Patient will achieve/maintain optimum respiratory ventilation and gas exchange  Outcome: Progressing       Patient is not progressing towards the following goals:

## 2021-09-05 NOTE — PROGRESS NOTES
Report received from PRISCILLA Bello. Patient has been oriented to new room. Denies pain, 4 L O2 via NC in use, SR on the monitor. Patient is A &O x 4. POC discussed. Questions answered and call light in reach.

## 2021-09-05 NOTE — PROGRESS NOTES
Hospital Medicine Daily Progress Note    Date of Service  9/5/2021    Chief Complaint  Michelle Espinoza is a 70 y.o. female admitted 8/31/2021 with shortness of breath    Hospital Course:    70-year-old female with history of smoking, insulin and COPD on 4 L nasal cannula with obstructive sleep apnea presented 8/31 with shortness of breath and fever also she had [place] with generalized weakness, on admission labs showed leukocytosis with elevated procalcitonin 44 and chest x-ray showed right Evex infiltration possible infection versus malignancy, antibiotics was started and blood culture came back positive with E. Coli, with some improving on her symptoms however repeat blood culture was positive on 9/1 and repeat blood culture again on 9/3 was positive, CT abdomen and chest possible septic emboli in the liver and possible pyelonephritis also showed pneumonia, MIMI was done 9/5 and did not show any vegetation, ID on board recommended to continue Unasyn.      Interval Problem Update  -Evaluated examined the patient at bedside, patient feels better no fever last night  -MIMI was negative Case was discussed with cardiologist  -Continue Unasyn  -Labs reviewed no leukocytosis  -Repeat blood culture tomorrow      I have personally seen and examined the patient at bedside. I discussed the plan of care with patient, family and Cardiology.    Consultants/Specialty  cardiology and infectious disease    Code Status  Full Code    Disposition  Patient is not medically cleared.   Anticipate discharge to to home with close outpatient follow-up.  I have placed the appropriate orders for post-discharge needs.    Review of Systems  Review of Systems   Constitutional: Negative for chills, fever, malaise/fatigue and weight loss.   HENT: Negative.    Respiratory: Positive for cough, shortness of breath and wheezing.    Cardiovascular: Negative.    Gastrointestinal: Negative.    Musculoskeletal: Negative.    Skin: Negative.     Neurological: Negative.    Psychiatric/Behavioral: Negative.         Physical Exam  Temp:  [35.9 °C (96.6 °F)-36.8 °C (98.2 °F)] 36.7 °C (98 °F)  Pulse:  [59-71] 67  Resp:  [12-19] 14  BP: (137-180)/(69-93) 137/69  SpO2:  [92 %-98 %] 94 %    Physical Exam  Constitutional:       Appearance: She is obese. She is not ill-appearing.   HENT:      Head: Normocephalic and atraumatic.   Eyes:      General: No scleral icterus.  Cardiovascular:      Rate and Rhythm: Normal rate.      Heart sounds: No murmur heard.     Pulmonary:      Effort: No respiratory distress.      Breath sounds: No stridor. Rales present. No wheezing.   Abdominal:      General: Bowel sounds are normal. There is no distension.      Tenderness: There is no abdominal tenderness. There is no guarding.   Skin:     Coloration: Skin is not jaundiced.      Findings: No bruising.   Neurological:      General: No focal deficit present.      Mental Status: She is alert and oriented to person, place, and time. Mental status is at baseline.      Cranial Nerves: No cranial nerve deficit.   Psychiatric:         Mood and Affect: Mood normal.         Fluids    Intake/Output Summary (Last 24 hours) at 9/5/2021 1038  Last data filed at 9/4/2021 1052  Gross per 24 hour   Intake 240 ml   Output --   Net 240 ml       Laboratory  Recent Labs     09/03/21  0319 09/04/21  0444 09/05/21  0258   WBC 8.3 7.4 10.4   RBC 3.90* 3.79* 4.25   HEMOGLOBIN 12.8 12.3 13.7   HEMATOCRIT 38.8 38.0 41.8   MCV 99.5* 100.3* 98.4*   MCH 32.8 32.5 32.2   MCHC 33.0* 32.4* 32.8*   RDW 49.8 49.3 47.0   PLATELETCT 108* 137* 217   MPV 11.1 11.0 11.2                       Imaging  EC-ECHOCARDIOGRAM COMPLETE W/O CONT   Final Result      CT-CHEST,ABDOMEN,PELVIS WITH   Final Result         1. Small peripheral ill-defined wedge-shaped low attenuating areas throughout the liver could relate to small infarcts secondary to septic emboli.         2. Ill-defined decreased enhancement in the left kidney is  nonspecific and could be seen with pyelonephritis. Renal infarct is in the differential but this appearance is not typical.      3. Hazy peripheral groundglass opacities throughout both lungs with interlobular septal thickening are nonspecific but could relate to infection as well.      DX-CHEST-PORTABLE (1 VIEW)   Final Result      Interstitial opacity compatible with underlying scarring      Some additional consolidative opacity right midlung zone could indicate bacterial or Covid pneumonia      Right apex subpleural opacity most likely from pleural-parenchymal scarring. Malignant nodule or infection cannot be excluded      EC-MIMI W/O CONT    (Results Pending)        Assessment/Plan  * Bacteremia  Assessment & Plan  Ecoli, pan sensitive but persistent  Blood culture on 8/31+ for E. Coli  Blood culture on 9/1+ for E. Coli  Blood culture on 9/3 also positive for E. coli  CT abdomen showed possible septic emboli on the liver  MIMI did not show any vegetation  ID on board, appreciate recommendations  Continue unasyn for now         Sepsis (HCC)- (present on admission)  Assessment & Plan  This is Sepsis Present on admission  SIRS criteria identified on my evaluation include: Tachycardia, with heart rate greater than 90 BPM, Tachypnea, with respirations greater than 20 per minute and Leukocytosis, with WBC greater than 12,000  Source is Respiratory  Sepsis protocol initiated  IV antibiotics as appropriate for source of sepsis  While organ dysfunction may be noted elsewhere in this problem list or in the chart, degree of organ dysfunction does not meet CMS criteria for severe sepsis  Sputum culture/blood culture x2, mycoplasma PCR, urine/strep urinary antigen ordered and pending  IV antibiotics initiated in ED and will continue onward.  Procalcitonin ordered and pending.  Significant for middle lobar pneumonia, influenza A/B, RSV and Covid negative.  MRSA probe ordered and pending.    Empiric vanc stopped  MIMI is  negative  Remains clinically stable but persistent ecoli bacteremia (pan sensitive)    Acute on chronic respiratory failure with hypoxia (HCC)- (present on admission)  Assessment & Plan  Continues to use tobacco however in agreement with tobacco cessation protocol.  Maintain SPO2 greater than 88%  Continue steroid taper and continue inhalers  Use 4 L at rest at home, she is on by her baseline  Chest x-ray reveals middle right lobar pneumonia  Influenza A/B, RSV and Covid negative  Improving and continue inhalers    COPD (chronic obstructive pulmonary disease) (HCC)- (present on admission)  Assessment & Plan  Continue supplemental oxygen to maintain SPO2 greater than 88%  Mild exacerbation  Completed steroid taper  Duo nebs as needed  Encouraged the patient to quit smoking    MICHAELA (obstructive sleep apnea)  Assessment & Plan  Cpap ordered, pt having trouble tolerating hospital machine    Thrombocytopenia (HCC)- (present on admission)  Assessment & Plan  Mild  Improving  Monitor intermittently    Tobacco abuse- (present on admission)  Assessment & Plan  6 minutes tobacco cessation counseling commenced at bedside.  Patient explained and educated the risks of smoking.  Education provided, in agreement with alternatives such as nicotine patch, Nicorette gum.  Strong recommendation for outpatient follow-up with smoking cessation program at discharge as patient still has urges to smoke however in agreement with cessation.    Obesity- (present on admission)  Assessment & Plan  Strong recommendation for outpatient follow-up with PCP for lifestyle modification including diet and exercise regimen of at least 30 minutes brisk cardiovascular exercise 3-5 times per week.    Hypothyroid- (present on admission)  Assessment & Plan  Continue Synthroid 112 mcg p.o. daily  Avoid calcium supplementation within 1 hour of administration    Uncontrolled type 1 diabetes mellitus (HCC)- (present on admission)  Assessment & Plan  Hemoglobin  A1c of 6.1 as of July 2021  Continue long acting, SSI plus additional short acting with carb counting  Continue to follow  Hypoglycemia as noted above, decreasing long acting insulin  Discussed with nursing  Continue to follow         VTE prophylaxis: SCDs/TEDs and enoxaparin ppx

## 2021-09-05 NOTE — CARE PLAN
Problem: Knowledge Deficit - Standard  Goal: Patient and family/care givers will demonstrate understanding of plan of care, disease process/condition, diagnostic tests and medications  Outcome: Progressing  Note: Discussed POC and medications with patient. Pt verbalized understanding.      Problem: Respiratory  Goal: Patient will achieve/maintain optimum respiratory ventilation and gas exchange  Outcome: Progressing  Note: Pt at baseline oxygen level.    The patient is Stable - Low risk of patient condition declining or worsening    Shift Goals  Clinical Goals: MIMI  Patient Goals: rest    Progress made toward(s) clinical / shift goals:  yes    Patient is not progressing towards the following goals:

## 2021-09-05 NOTE — HOSPITAL COURSE
70-year-old female with history of smoking, insulin and COPD on 4 L nasal cannula with obstructive sleep apnea presented 8/31 with shortness of breath and fever also she had [place] with generalized weakness, on admission labs showed leukocytosis with elevated procalcitonin 44 and chest x-ray showed right Evex infiltration possible infection versus malignancy, antibiotics was started and blood culture came back positive with E. Coli, with some improving on her symptoms however repeat blood culture was positive on 9/1 and repeat blood culture again on 9/3 was positive, CT abdomen and chest possible septic emboli in the liver and possible pyelonephritis also showed pneumonia, MIMI was done 9/5 and did not show any vegetation, ID on board recommended to continue Unasyn.

## 2021-09-05 NOTE — PROGRESS NOTES
Report given to telemetry RN. Pt transferred to [place] [id number] with telemetry RN x2 with belongings and pt medications from med [facility].

## 2021-09-05 NOTE — PROGRESS NOTES
Infectious Disease Progress Note    Author: David Iglesias M.D. Date & Time of service: 2021  9:28 AM    Chief Complaint:  Follow-up for E. coli bacteremia    Interval History:   patient is afebrile, white count 7.4, repeat blood cultures x2 again positive despite appropriate antibiotics   patient remains afebrile, white count is 10.4, transferred to telemetry floor.  Patient is resting comfortably this morning.    Labs Reviewed and Medications Reviewed.    Review of Systems:  Review of Systems   Unable to perform ROS: Other       Hemodynamics:  Temp (24hrs), Av.5 °C (97.7 °F), Min:35.9 °C (96.6 °F), Max:36.8 °C (98.2 °F)  Temperature: 36.7 °C (98 °F)  Pulse  Av.9  Min: 62  Max: 117   Blood Pressure : 145/80       Physical Exam:  Physical Exam  Vitals and nursing note reviewed.   Constitutional:       General: She is not in acute distress.     Appearance: She is not toxic-appearing or diaphoretic.   HENT:      Mouth/Throat:      Pharynx: No oropharyngeal exudate.   Eyes:      General: No scleral icterus.        Right eye: No discharge.         Left eye: No discharge.      Conjunctiva/sclera: Conjunctivae normal.   Cardiovascular:      Rate and Rhythm: Normal rate.      Heart sounds: No murmur heard.     Pulmonary:      Effort: Pulmonary effort is normal. No respiratory distress.      Breath sounds: No stridor. No wheezing.   Abdominal:      General: There is no distension.      Tenderness: There is no abdominal tenderness.   Musculoskeletal:         General: No swelling or tenderness.      Cervical back: No rigidity.   Skin:     Findings: No erythema or rash.   Neurological:      General: No focal deficit present.      Mental Status: She is alert and oriented to person, place, and time.   Psychiatric:         Mood and Affect: Mood normal.         Behavior: Behavior normal.      Comments: Pleasant         Meds:    Current Facility-Administered Medications:   •  fentaNYL  •  midazolam  •   albuterol  •  albuterol  •  insulin glargine  •  lidocaine  •  simethicone  •  enalaprilat  •  ipratropium-albuterol  •  omeprazole  •  cetirizine  •  captopril  •  mag hydrox-al hydrox-simeth  •  aspirin EC  •  fluticasone  •  FLUoxetine  •  levothyroxine  •  lovastatin  •  montelukast  •  raloxifene  •  senna-docusate **AND** polyethylene glycol/lytes **AND** magnesium hydroxide **AND** bisacodyl  •  enoxaparin  •  ampicillin-sulbactam (UNASYN) IV  •  guaiFENesin dextromethorphan  •  ondansetron  •  ondansetron  •  nicotine **AND** Nicotine Replacement Patient Education Materials **AND** nicotine polacrilex  •  tiotropium  •  Respiratory Therapy Consult  •  guaiFENesin ER  •  acetaminophen  •  insulin regular **AND** POC blood glucose manual result **AND** NOTIFY MD and PharmD **AND** glucose **AND** dextrose 50%    Labs:  Recent Labs     09/03/21  0319 09/04/21  0444 09/05/21  0258   WBC 8.3 7.4 10.4   RBC 3.90* 3.79* 4.25   HEMOGLOBIN 12.8 12.3 13.7   HEMATOCRIT 38.8 38.0 41.8   MCV 99.5* 100.3* 98.4*   MCH 32.8 32.5 32.2   RDW 49.8 49.3 47.0   PLATELETCT 108* 137* 217   MPV 11.1 11.0 11.2   NEUTSPOLYS 71.40 71.30 66.40   LYMPHOCYTES 16.00* 17.10* 23.40   MONOCYTES 11.50 9.10 7.30   EOSINOPHILS 0.40 0.90 1.10   BASOPHILS 0.10 0.30 0.40     No results for input(s): SODIUM, POTASSIUM, CHLORIDE, CO2, GLUCOSE, BUN, CPKTOTAL in the last 72 hours.  No results for input(s): ALBUMIN, TBILIRUBIN, ALKPHOSPHAT, TOTPROTEIN, ALTSGPT, ASTSGOT, CREATININE in the last 72 hours.    Imaging:  CT-CHEST,ABDOMEN,PELVIS WITH    Result Date: 9/2/2021 9/2/2021 5:17 PM HISTORY/REASON FOR EXAM:  Abdominal pain, fever; evaluate for intra-abdominal abscess or infection in RUQ or LUQ (regions tender to palpation). e coli bacteremia.. TECHNIQUE/EXAM DESCRIPTION: CT scan of the chest, abdomen and pelvis with contrast. Thin-section helical scanning was obtained with intravenous contrast from the lung apices through the pubic symphysis to  include the chest, abdomen and pelvis. 80 mL of Omnipaque 350 nonionic contrast was administered intravenously without complication. Low dose optimization technique was utilized for this CT exam including automated exposure control and adjustment of the mA and/or kV according to patient size. COMPARISON: None. FINDINGS: CT Chest: Lungs: Hazy peripheral groundglass opacities throughout both lungs with interlobular septal thickening. Mediastinum/Marisabel: No significant adenopathy. Pleura: No pleural effusion. Cardiac: Heart normal in size without pericardial effusion. Vascular: No central pulmonary embolus. Soft tissues: Unremarkable. Bones: No acute or destructive process. Small hiatal hernia. CT Abdomen and Pelvis: Liver: Ill-defined peripheral wedge-shaped low attenuating areas throughout the liver. Spleen: Unremarkable. Pancreas: Unremarkable. Gallbladder: No calcified stones. Biliary: Nondilated. Adrenal glands: Normal. Kidneys: Ill-defined decreased enhancement in the left kidney. No hydronephrosis. Bowel: No obstruction or acute inflammation. Lymph nodes: No adenopathy. Vasculature: Unremarkable. Peritoneum: Unremarkable without ascites. Musculoskeletal: Moderate degenerative change of the lumbar spine. Pelvis: Limited visualization of the pelvis due to streak artifact from left hip prosthesis. No obvious fluid collection identified.     1. Small peripheral ill-defined wedge-shaped low attenuating areas throughout the liver could relate to small infarcts secondary to septic emboli. 2. Ill-defined decreased enhancement in the left kidney is nonspecific and could be seen with pyelonephritis. Renal infarct is in the differential but this appearance is not typical. 3. Hazy peripheral groundglass opacities throughout both lungs with interlobular septal thickening are nonspecific but could relate to infection as well.    DX-CHEST-PORTABLE (1 VIEW)    Result Date: 8/31/2021 8/31/2021 12:54 AM HISTORY/REASON FOR EXAM:  Shortness of Breath. TECHNIQUE/EXAM DESCRIPTION AND NUMBER OF VIEWS: Single AP view of the chest. COMPARISON: September 15, 2018 FINDINGS: Hardware: No change. Axillary and chest wall vascular clips bilaterally Lungs: Reticular pulmonary opacity again seen. Some new ill-defined right midlung zone opacity. Subpleural right apical opacity is seen. Pleura:  No pleural space process is seen. Heart and mediastinum: There is mild cardiac silhouette enlargement.     Interstitial opacity compatible with underlying scarring Some additional consolidative opacity right midlung zone could indicate bacterial or Covid pneumonia Right apex subpleural opacity most likely from pleural-parenchymal scarring. Malignant nodule or infection cannot be excluded    EC-ECHOCARDIOGRAM COMPLETE W/O CONT    Result Date: 9/3/2021  Transthoracic Echo Report Echocardiography Laboratory CONCLUSIONS No prior study is available for comparison. Left ventricular ejection fraction is visually estimated to be 60%. Normal diastolic function. Normal right ventricular size and systolic function. ANILA VALENTINO Exam Date:         2021                    15:03 Exam Location:     Inpatient Priority:          Routine Ordering Physician:        PAPITO KEANE Referring Physician:       [id number] Booker Sonographer:               Mone Medel [facility] Age:    70     Gender:    F MRN:    [id number] :    1950 BSA:    2.17   Ht (in):    68     Wt (lb):    231 Exam Type:     Complete Indications:     Endocarditis ICD Codes:       421 CPT Codes:       26344 BP:   128    /   74     HR:   61 Technical Quality:       Fair MEASUREMENTS  (Male / Female) Normal Values 2D ECHO LV Diastolic Diameter PLAX        4.4 cm                4.2 - 5.9 / 3.9 - 5.3 cm LV Systolic Diameter PLAX         3.3 cm                2.1 - 4.0 cm IVS Diastolic Thickness           1.1 cm                LVPW Diastolic Thickness          1.1 cm                LVOT Diameter                      2.2 cm                Estimated LV Ejection Fraction    60 %                  LV Ejection Fraction MOD BP       75.5 %                >= 55  % LV Ejection Fraction MOD 4C       65.9 %                LV Ejection Fraction MOD 2C       79.6 %                IVC Diameter                      1.6 cm                DOPPLER AV Peak Velocity                  1.3 m/s               AV Peak Gradient                  7.1 mmHg              AV Mean Gradient                  4.1 mmHg              LVOT Peak Velocity                0.98 m/s              AV Area Cont Eq vti               2.8 cm2               Mitral E Point Velocity           0.9 m/s               Mitral E to A Ratio               1.2                   MV Pressure Half Time             63.8 ms               MV Area PHT                       3.4 cm2               MV Deceleration Time              220 ms                * Indicates values subject to auto-interpretation LV EF:  60    % FINDINGS Left Ventricle Normal left ventricular size and systolic function. Mild concentric left ventricular hypertrophy. Normal regional wall motion. Left ventricular ejection fraction is visually estimated to be 60%. Normal diastolic function. Right Ventricle Normal right ventricular size and systolic function. Right Atrium Normal right atrial size. Normal inferior vena cava size and inspiratory collapse. Left Atrium Normal left atrial size. Left atrial volume index is 24  mL/sq m. Mitral Valve The mitral valve is not well visualized. No stenosis or regurgitation seen. Aortic Valve The aortic valve is not well visualized. No stenosis or regurgitation seen. Tricuspid Valve The tricuspid valve is not well visualized. No stenosis or regurgitation seen. Right atrial pressure is estimated to be 3 mmHg. Unable to estimate pulmonary artery pressure due to an inadequate tricuspid regurgitant jet. Pulmonic Valve The pulmonic valve is not well visualized. Pericardium No pericardial effusion seen.  "Aorta The aortic root is normal.  Ascending aorta not well visualized. Frank Allan MD (Electronically Signed) Final Date:     03 September 2021                 16:23      Micro:  Results     Procedure Component Value Units Date/Time    BLOOD CULTURE [[id number]]  (Abnormal) Collected: 09/01/21 1347    Order Status: Completed Specimen: Blood from Peripheral Updated: 09/04/21 0902     Significant Indicator POS     Source BLD     Site PERIPHERAL     Culture Result Growth detected by Bactec instrument. 09/02/2021  07:26      Escherichia coli  See previous culture for sensitivity report.      Narrative:      CALL  Lane  141 tel. [phone],  CALLED  141 tel. [phone] 09/02/2021, 07:26, RB PERF. RESULTS CALLED  TO:[phone] PRISCILLA Alvarez  Per Hospital Policy: Only change Specimen Src: to \"Line\" if  specified by physician order.  Left Hand    BLOOD CULTURE [[id number]]  (Abnormal)  (Susceptibility) Collected: 09/01/21 1347    Order Status: Completed Specimen: Blood from Peripheral Updated: 09/04/21 0902     Significant Indicator POS     Source BLD     Site PERIPHERAL     Culture Result Growth detected by Bactec instrument. 09/02/2021  06:22      Escherichia coli    Narrative:      CALL  Lane  141 tel. [phone],  CALLED  141 tel. [phone] 09/02/2021, 06:25, RB PERF. RESULTS CALLED TO:  [phone], RN  Per Hospital Policy: Only change Specimen Src: to \"Line\" if  specified by physician order.  Left Forearm/Arm    Susceptibility     Escherichia coli (1)     Antibiotic Interpretation Microscan Method Status    Ampicillin Sensitive <=8 mcg/mL KALYN Final    Ceftriaxone Sensitive <=1 mcg/mL KALYN Final    Cefazolin Sensitive <=2 mcg/mL KALYN Final    Ciprofloxacin Sensitive <=0.25 mcg/mL KALYN Final    Cefepime Sensitive <=2 mcg/mL KALYN Final    Cefuroxime Sensitive <=4 mcg/mL KALYN Final    Ampicillin/sulbactam Sensitive <=4/2 mcg/mL KALYN Final    Tobramycin Sensitive <=2 mcg/mL KALYN Final    Ertapenem Sensitive <=0.5 mcg/mL KALYN Final    " "Gentamicin Sensitive <=2 mcg/mL KALYN Final    Moxifloxacin Sensitive <=2 mcg/mL KALYN Final    Pip/Tazobactam Sensitive <=8 mcg/mL KALYN Final    Trimeth/Sulfa Sensitive <=0.5/9.5 mcg/mL KALYN Final    Tigecycline Sensitive <=2 mcg/mL KALYN Final                   BLOOD CULTURE [[id number]]  (Abnormal) Collected: 09/03/21 0319    Order Status: Completed Specimen: Blood from Peripheral Updated: 09/04/21 0858     Significant Indicator POS     Source BLD     Site PERIPHERAL     Culture Result Growth detected by Bactec instrument. 09/04/2021  08:57  Gram Stain: Gram negative rods.      Narrative:      CALL  Laen  141 tel. [phone],  CALLED  141 tel. [phone] 09/04/2021, 08:58, RB PERF. RESULTS CALLED  TO:[phone]  Per Hospital Policy: Only change Specimen Src: to \"Line\" if  specified by physician order.  Right AC    BLOOD CULTURE [[id number]] Collected: 09/03/21 0319    Order Status: Completed Specimen: Blood from Peripheral Updated: 09/04/21 0648     Significant Indicator NEG     Source BLD     Site PERIPHERAL     Culture Result No Growth  Note: Blood cultures are incubated for 5 days and  are monitored continuously.Positive blood cultures  are called to the RN and reported as soon as  they are identified.      Narrative:      Per Hospital Policy: Only change Specimen Src: to \"Line\" if  specified by physician order.  Right Wrist    URINE CULTURE-EXISTING-LESS THAN 48 HOURS [[id number]] Collected: 08/31/21 0634    Order Status: Completed Specimen: Urine, Clean Catch Updated: 09/03/21 1018     Significant Indicator NEG     Source UR     Site URINE, CLEAN CATCH     Culture Result Usual urogenital sammie ,000 cfu/mL    Narrative:      Please obtain from prior Urine sample  Indication for culture:->New onset fever with no other  identified cause  Please obtain from prior Urine sample    BLOOD CULTURE [[id number]]  (Abnormal) Collected: 08/31/21 0230    Order Status: Completed Specimen: Blood from Peripheral Updated: 09/02/21 1017 " "    Significant Indicator POS     Source BLD     Site PERIPHERAL     Culture Result Growth detected by Bactec instrument. 08/31/2021  15:42      Escherichia coli  See previous culture for sensitivity report.      Narrative:      CALL  Lane  141 tel. [phone],  CALLED  141 tel. [phone] 08/31/2021, 15:43, RB PERF. RESULTS CALLED  TO:[phone] RN  Per Hospital Policy: Only change Specimen Src: to \"Line\" if  specified by physician order.  No site indicated    BLOOD CULTURE [[id number]]  (Abnormal)  (Susceptibility) Collected: 08/31/21 0220    Order Status: Completed Specimen: Blood from Peripheral Updated: 09/02/21 1015     Significant Indicator POS     Source BLD     Site PERIPHERAL     Culture Result Growth detected by Bactec instrument. 08/31/2021  15:40      Escherichia coli    Narrative:      CALL  Lane  141 tel. [phone],  CALLED  141 tel. [phone] 08/31/2021, 15:42, RB PERF. RESULTS CALLED  TO:[phone] RN  Per Hospital Policy: Only change Specimen Src: to \"Line\" if  specified by physician order.  No site indicated    Susceptibility     Escherichia coli (1)     Antibiotic Interpretation Microscan Method Status    Ampicillin Sensitive <=8 mcg/mL KALYN Final    Ceftriaxone Sensitive <=1 mcg/mL KALYN Final    Cefazolin Sensitive <=2 mcg/mL KALYN Final    Ciprofloxacin Sensitive <=0.25 mcg/mL KALYN Final    Cefepime Sensitive <=2 mcg/mL KALYN Final    Cefuroxime Sensitive <=4 mcg/mL KALYN Final    Ampicillin/sulbactam Sensitive <=4/2 mcg/mL KALYN Final    Tobramycin Sensitive <=2 mcg/mL KALYN Final    Ertapenem Sensitive <=0.5 mcg/mL KALYN Final    Gentamicin Sensitive <=2 mcg/mL KALYN Final    Moxifloxacin Sensitive <=2 mcg/mL KALYN Final    Pip/Tazobactam Sensitive <=8 mcg/mL KALYN Final    Trimeth/Sulfa Sensitive <=0.5/9.5 mcg/mL KALYN Final    Tigecycline Sensitive <=2 mcg/mL KALYN Final                   MRSA By PCR (Amp) [[id number]] Collected: 08/31/21 1709    Order Status: Completed Specimen: Respirate Updated: 08/31/21 2104     " "Significant Indicator NEG     Source RESP     Site Nares     MRSA PCR Negative for MRSA by PCR.    Urinalysis [[id number]]  (Abnormal) Collected: 08/31/21 0604    Order Status: Completed Specimen: Urine, Clean Catch Updated: 08/31/21 0626     Color DK Yellow     Character Turbid     Specific Gravity 1.020     Ph 5.5     Glucose Negative mg/dL      Ketones Trace mg/dL      Protein 100 mg/dL      Bilirubin Negative     Urobilinogen, Urine 1.0     Nitrite Positive     Leukocyte Esterase Large     Occult Blood Moderate     Micro Urine Req Microscopic    Narrative:      If not done within the last 24 hours    MRSA By PCR (Amp) [[id number]]     Order Status: Canceled Specimen: Sputum from Nares     COV-2, FLU A/B, AND RSV BY PCR (2-4 HOURS CEPHEID): Collect NP swab in VTM [[id number]] Collected: 08/31/21 0132    Order Status: Completed Specimen: Respirate from Nasopharyngeal Updated: 08/31/21 0312     Influenza virus A RNA Negative     Influenza virus B, PCR Negative     RSV, PCR Negative     SARS-CoV-2 by PCR NotDetected     Comment: PATIENTS: Important information regarding your results and instructions can  be found at [email or web address]   \"After your  Covid-19 Test\"    [facility] providers: PLEASE REFER TO DE-ESCALATION AND RETESTING PROTOCOL  on [email or web address]    **The [employer] GeneXpert Xpress SARS-CoV-2 RT-PCR Test has been made  available for use under the Emergency Use Authorization (EUA) only.          SARS-CoV-2 Source NP Swab    Narrative:      Have you been in close contact with a person who is suspected  or known to be positive for COVID-19 within the last 30 days  (e.g. last seen that person < 30 days ago)->Unknown    Culture Respiratory W/ Grm Stn [[id number]] Collected: 08/31/21 0000    Order Status: Canceled Specimen: Sputum           Assessment:  70-year-old female patient with chronic hypoxemic respiratory failure on 4 L oxygen at home, type 1 diabetes, obesity, MICHAELA, presented " with cough and shortness of breath, was found to have sepsis with E. coli bacteremia.  The radiologist did interpret a possible right middle lobe pneumonia.  However, despite being on appropriate antibiotics, repeat blood cultures x2 were positive for pansensitive E. coli which is unusual in the absence of source control issues.  Patient does have a right upper quadrant tenderness on exam.    Pertinent Diagnoses:  E. coli bacteremia, persistent  Type 1 diabetes  Chronic hypoxemic respiratory failure  MICHAELA  Obesity    Plan:  -Continue IV Unasyn 3 g every 6 hours  -Blood cultures positive from 8/31, 9/1, 9/3  -CT chest abdomen pelvis with no source control issues.  Noted multiple small wedge-shaped lesions in the liver concerning for hepatic infarcts from septic embolization  -TTE with no obvious valvular vegetations, recommend MIMI -pending today.  Gram-negative infective endocarditis is rare but high suspicion in this setting and will significantly alter management    Discussed with internal medicine, Dr. Jarquin    ID will follow.  Please call with questions.

## 2021-09-05 NOTE — PROGRESS NOTES
Assumed care at 0645. Bedside report received from christian Rodas. Patient's chart and MAR reviewed. 12 hour chart check complete. Assessment complete, pt 0/10 pain at this time. Pt is awake in bed. Pt is A & O x 4. Patient was updated on plan of care for the day. Questions answered and concerns addressed.  Pt denies any additional needs at this time. White board updated. Call light, phone and personal belongings within reach. Bed alarm on and working appropriately. Vital signs stable.

## 2021-09-06 LAB
ANION GAP SERPL CALC-SCNC: 9 MMOL/L (ref 7–16)
BASOPHILS # BLD AUTO: 0.3 % (ref 0–1.8)
BASOPHILS # BLD: 0.02 K/UL (ref 0–0.12)
BUN SERPL-MCNC: 13 MG/DL (ref 8–22)
CALCIUM SERPL-MCNC: 8.6 MG/DL (ref 8.5–10.5)
CHLORIDE SERPL-SCNC: 105 MMOL/L (ref 96–112)
CO2 SERPL-SCNC: 27 MMOL/L (ref 20–33)
CREAT SERPL-MCNC: 0.94 MG/DL (ref 0.5–1.4)
CRP SERPL HS-MCNC: 6.17 MG/DL (ref 0–0.75)
EOSINOPHIL # BLD AUTO: 0.13 K/UL (ref 0–0.51)
EOSINOPHIL NFR BLD: 1.6 % (ref 0–6.9)
ERYTHROCYTE [DISTWIDTH] IN BLOOD BY AUTOMATED COUNT: 46.8 FL (ref 35.9–50)
GLUCOSE BLD-MCNC: 101 MG/DL (ref 65–99)
GLUCOSE BLD-MCNC: 131 MG/DL (ref 65–99)
GLUCOSE BLD-MCNC: 145 MG/DL (ref 65–99)
GLUCOSE BLD-MCNC: 195 MG/DL (ref 65–99)
GLUCOSE BLD-MCNC: 51 MG/DL (ref 65–99)
GLUCOSE BLD-MCNC: 64 MG/DL (ref 65–99)
GLUCOSE SERPL-MCNC: 148 MG/DL (ref 65–99)
HCT VFR BLD AUTO: 37.4 % (ref 37–47)
HGB BLD-MCNC: 12.4 G/DL (ref 12–16)
IMM GRANULOCYTES # BLD AUTO: 0.08 K/UL (ref 0–0.11)
IMM GRANULOCYTES NFR BLD AUTO: 1 % (ref 0–0.9)
LYMPHOCYTES # BLD AUTO: 1.46 K/UL (ref 1–4.8)
LYMPHOCYTES NFR BLD: 18.3 % (ref 22–41)
MAGNESIUM SERPL-MCNC: 1.9 MG/DL (ref 1.5–2.5)
MCH RBC QN AUTO: 32.7 PG (ref 27–33)
MCHC RBC AUTO-ENTMCNC: 33.2 G/DL (ref 33.6–35)
MCV RBC AUTO: 98.7 FL (ref 81.4–97.8)
MONOCYTES # BLD AUTO: 0.43 K/UL (ref 0–0.85)
MONOCYTES NFR BLD AUTO: 5.4 % (ref 0–13.4)
NEUTROPHILS # BLD AUTO: 5.87 K/UL (ref 2–7.15)
NEUTROPHILS NFR BLD: 73.4 % (ref 44–72)
NRBC # BLD AUTO: 0 K/UL
NRBC BLD-RTO: 0 /100 WBC
PLATELET # BLD AUTO: 216 K/UL (ref 164–446)
PMV BLD AUTO: 10.8 FL (ref 9–12.9)
POTASSIUM SERPL-SCNC: 3.7 MMOL/L (ref 3.6–5.5)
PROCALCITONIN SERPL-MCNC: 0.75 NG/ML
RBC # BLD AUTO: 3.79 M/UL (ref 4.2–5.4)
SODIUM SERPL-SCNC: 141 MMOL/L (ref 135–145)
WBC # BLD AUTO: 8 K/UL (ref 4.8–10.8)

## 2021-09-06 PROCEDURE — 85025 COMPLETE CBC W/AUTO DIFF WBC: CPT

## 2021-09-06 PROCEDURE — 700111 HCHG RX REV CODE 636 W/ 250 OVERRIDE (IP): Performed by: STUDENT IN AN ORGANIZED HEALTH CARE EDUCATION/TRAINING PROGRAM

## 2021-09-06 PROCEDURE — A9270 NON-COVERED ITEM OR SERVICE: HCPCS | Performed by: INTERNAL MEDICINE

## 2021-09-06 PROCEDURE — A9270 NON-COVERED ITEM OR SERVICE: HCPCS | Performed by: HOSPITALIST

## 2021-09-06 PROCEDURE — 82962 GLUCOSE BLOOD TEST: CPT | Mod: 91

## 2021-09-06 PROCEDURE — 700105 HCHG RX REV CODE 258: Performed by: INTERNAL MEDICINE

## 2021-09-06 PROCEDURE — A9270 NON-COVERED ITEM OR SERVICE: HCPCS | Performed by: STUDENT IN AN ORGANIZED HEALTH CARE EDUCATION/TRAINING PROGRAM

## 2021-09-06 PROCEDURE — 700111 HCHG RX REV CODE 636 W/ 250 OVERRIDE (IP): Performed by: INTERNAL MEDICINE

## 2021-09-06 PROCEDURE — 700102 HCHG RX REV CODE 250 W/ 637 OVERRIDE(OP): Performed by: HOSPITALIST

## 2021-09-06 PROCEDURE — 94760 N-INVAS EAR/PLS OXIMETRY 1: CPT

## 2021-09-06 PROCEDURE — 87040 BLOOD CULTURE FOR BACTERIA: CPT | Mod: 91

## 2021-09-06 PROCEDURE — 80048 BASIC METABOLIC PNL TOTAL CA: CPT

## 2021-09-06 PROCEDURE — 86140 C-REACTIVE PROTEIN: CPT

## 2021-09-06 PROCEDURE — 83735 ASSAY OF MAGNESIUM: CPT

## 2021-09-06 PROCEDURE — 99232 SBSQ HOSP IP/OBS MODERATE 35: CPT | Performed by: HOSPITALIST

## 2021-09-06 PROCEDURE — 700102 HCHG RX REV CODE 250 W/ 637 OVERRIDE(OP): Performed by: INTERNAL MEDICINE

## 2021-09-06 PROCEDURE — 99233 SBSQ HOSP IP/OBS HIGH 50: CPT | Performed by: INTERNAL MEDICINE

## 2021-09-06 PROCEDURE — 94640 AIRWAY INHALATION TREATMENT: CPT

## 2021-09-06 PROCEDURE — A9270 NON-COVERED ITEM OR SERVICE: HCPCS

## 2021-09-06 PROCEDURE — 700102 HCHG RX REV CODE 250 W/ 637 OVERRIDE(OP)

## 2021-09-06 PROCEDURE — 700102 HCHG RX REV CODE 250 W/ 637 OVERRIDE(OP): Performed by: STUDENT IN AN ORGANIZED HEALTH CARE EDUCATION/TRAINING PROGRAM

## 2021-09-06 PROCEDURE — 84145 PROCALCITONIN (PCT): CPT

## 2021-09-06 PROCEDURE — 36415 COLL VENOUS BLD VENIPUNCTURE: CPT

## 2021-09-06 PROCEDURE — 770006 HCHG ROOM/CARE - MED/SURG/GYN SEMI*

## 2021-09-06 RX ADMIN — OMEPRAZOLE 20 MG: 20 CAPSULE, DELAYED RELEASE ORAL at 17:13

## 2021-09-06 RX ADMIN — GUAIFENESIN 1200 MG: 600 TABLET, EXTENDED RELEASE ORAL at 05:17

## 2021-09-06 RX ADMIN — TIOTROPIUM BROMIDE INHALATION SPRAY 5 MCG: 3.12 SPRAY, METERED RESPIRATORY (INHALATION) at 09:23

## 2021-09-06 RX ADMIN — DOCUSATE SODIUM 50 MG AND SENNOSIDES 8.6 MG 2 TABLET: 8.6; 5 TABLET, FILM COATED ORAL at 05:18

## 2021-09-06 RX ADMIN — LEVOTHYROXINE SODIUM 112 MCG: 0.11 TABLET ORAL at 05:18

## 2021-09-06 RX ADMIN — NICOTINE 14 MG: 14 PATCH TRANSDERMAL at 05:18

## 2021-09-06 RX ADMIN — OMEPRAZOLE 20 MG: 20 CAPSULE, DELAYED RELEASE ORAL at 05:17

## 2021-09-06 RX ADMIN — BUDESONIDE AND FORMOTEROL FUMARATE DIHYDRATE 2 PUFF: 80; 4.5 AEROSOL RESPIRATORY (INHALATION) at 09:23

## 2021-09-06 RX ADMIN — CAPTOPRIL 12.5 MG: 12.5 TABLET ORAL at 05:18

## 2021-09-06 RX ADMIN — ASPIRIN 81 MG: 81 TABLET, COATED ORAL at 05:18

## 2021-09-06 RX ADMIN — ALBUTEROL SULFATE 2 PUFF: 90 AEROSOL, METERED RESPIRATORY (INHALATION) at 19:32

## 2021-09-06 RX ADMIN — INSULIN HUMAN 5 UNITS: 100 INJECTION, SOLUTION PARENTERAL at 07:58

## 2021-09-06 RX ADMIN — MONTELUKAST 10 MG: 10 TABLET, FILM COATED ORAL at 05:18

## 2021-09-06 RX ADMIN — GUAIFENESIN 1200 MG: 600 TABLET, EXTENDED RELEASE ORAL at 17:13

## 2021-09-06 RX ADMIN — BUDESONIDE AND FORMOTEROL FUMARATE DIHYDRATE 2 PUFF: 80; 4.5 AEROSOL RESPIRATORY (INHALATION) at 19:31

## 2021-09-06 RX ADMIN — CEFTRIAXONE SODIUM 2 G: 2 INJECTION, POWDER, FOR SOLUTION INTRAMUSCULAR; INTRAVENOUS at 15:15

## 2021-09-06 RX ADMIN — TRAMADOL HYDROCHLORIDE 50 MG: 50 TABLET, FILM COATED ORAL at 12:22

## 2021-09-06 RX ADMIN — ENOXAPARIN SODIUM 40 MG: 40 INJECTION SUBCUTANEOUS at 05:18

## 2021-09-06 RX ADMIN — SODIUM CHLORIDE 3 G: 900 INJECTION INTRAVENOUS at 03:24

## 2021-09-06 RX ADMIN — LOVASTATIN 20 MG: 20 TABLET ORAL at 21:50

## 2021-09-06 RX ADMIN — TRAMADOL HYDROCHLORIDE 50 MG: 50 TABLET, FILM COATED ORAL at 05:22

## 2021-09-06 RX ADMIN — ACETAMINOPHEN 650 MG: 325 TABLET, FILM COATED ORAL at 19:40

## 2021-09-06 RX ADMIN — FLUOXETINE 20 MG: 20 CAPSULE ORAL at 05:18

## 2021-09-06 RX ADMIN — DOCUSATE SODIUM 50 MG AND SENNOSIDES 8.6 MG 2 TABLET: 8.6; 5 TABLET, FILM COATED ORAL at 17:13

## 2021-09-06 RX ADMIN — CETIRIZINE HYDROCHLORIDE 10 MG: 10 TABLET, FILM COATED ORAL at 17:13

## 2021-09-06 RX ADMIN — SODIUM CHLORIDE 3 G: 900 INJECTION INTRAVENOUS at 10:08

## 2021-09-06 RX ADMIN — CETIRIZINE HYDROCHLORIDE 10 MG: 10 TABLET, FILM COATED ORAL at 05:18

## 2021-09-06 RX ADMIN — ACETAMINOPHEN 650 MG: 325 TABLET, FILM COATED ORAL at 05:22

## 2021-09-06 RX ADMIN — RALOXIFENE 60 MG: 60 TABLET ORAL at 05:32

## 2021-09-06 RX ADMIN — INSULIN HUMAN 8 UNITS: 100 INJECTION, SOLUTION PARENTERAL at 17:10

## 2021-09-06 RX ADMIN — TRAMADOL HYDROCHLORIDE 50 MG: 50 TABLET, FILM COATED ORAL at 19:40

## 2021-09-06 RX ADMIN — ACETAMINOPHEN 650 MG: 325 TABLET, FILM COATED ORAL at 12:22

## 2021-09-06 ASSESSMENT — ENCOUNTER SYMPTOMS
DIAPHORESIS: 0
NEUROLOGICAL NEGATIVE: 1
SORE THROAT: 0
COUGH: 1
CHILLS: 0
WEIGHT LOSS: 0
PSYCHIATRIC NEGATIVE: 1
MUSCULOSKELETAL NEGATIVE: 1
VOMITING: 0
CONSTIPATION: 0
GASTROINTESTINAL NEGATIVE: 1
SPUTUM PRODUCTION: 1
DIZZINESS: 0
SHORTNESS OF BREATH: 0
WEAKNESS: 0
CARDIOVASCULAR NEGATIVE: 1
FOCAL WEAKNESS: 0
BRUISES/BLEEDS EASILY: 0
NAUSEA: 0
HEADACHES: 0
CONSTITUTIONAL NEGATIVE: 1
DEPRESSION: 0
FEVER: 0
MYALGIAS: 0
BACK PAIN: 1
ABDOMINAL PAIN: 0
EYES NEGATIVE: 1
COUGH: 0
DIARRHEA: 0
SHORTNESS OF BREATH: 1
PALPITATIONS: 0
BLURRED VISION: 0

## 2021-09-06 ASSESSMENT — PAIN DESCRIPTION - PAIN TYPE
TYPE: CHRONIC PAIN

## 2021-09-06 ASSESSMENT — LIFESTYLE VARIABLES: SUBSTANCE_ABUSE: 0

## 2021-09-06 NOTE — CARE PLAN
The patient is Stable - Low risk of patient condition declining or worsening    Shift Goals  Clinical Goals: rest  Patient Goals: rest    Progress made toward(s) clinical / shift goals:  Calming environment provided.  Medicated for pain per MAR.  Hourly rounding in place.      Patient is not progressing towards the following goals:

## 2021-09-06 NOTE — PROGRESS NOTES
Infectious Disease Progress Note    Author: Brittanie Jones M.D. Date & Time of service: 2021  9:16 AM    Chief Complaint:  Follow-up for E. coli bacteremia     Interval History:   patient is afebrile, white count 7.4, repeat blood cultures x2 again positive despite appropriate antibiotics   patient remains afebrile, white count is 10.4, transferred to telemetry floor.  Patient is resting comfortably this morning.     Review of Systems:  Review of Systems   Constitutional: Negative for chills, fever and malaise/fatigue.   Respiratory: Negative for cough and shortness of breath.    Gastrointestinal: Negative for abdominal pain, constipation, diarrhea, nausea and vomiting.   Musculoskeletal: Positive for back pain. Negative for myalgias.       Hemodynamics:  Temp (24hrs), Av.6 °C (97.9 °F), Min:36.1 °C (96.9 °F), Max:37.2 °C (98.9 °F)  Temperature: 36.9 °C (98.4 °F)  Pulse  Av.3  Min: 59  Max: 117   Blood Pressure : 116/73       Physical Exam:  Physical Exam  Constitutional:       Appearance: Normal appearance.   Cardiovascular:      Rate and Rhythm: Normal rate and regular rhythm.      Heart sounds: Normal heart sounds.   Pulmonary:      Effort: Pulmonary effort is normal.      Comments: Diminished breath sounds right greater than left  Abdominal:      General: Abdomen is flat. Bowel sounds are normal.      Palpations: Abdomen is soft.   Musculoskeletal:      Right lower leg: No edema.      Left lower leg: No edema.      Comments: No spinal point tenderness   Skin:     General: Skin is warm and dry.   Neurological:      General: No focal deficit present.      Mental Status: She is alert and oriented to person, place, and time.   Psychiatric:         Mood and Affect: Mood normal.         Behavior: Behavior normal.         Meds:    Current Facility-Administered Medications:   •  albuterol  •  albuterol  •  budesonide-formoterol  •  traMADol  •  insulin glargine  •  simethicone  •  enalaprilat  •   ipratropium-albuterol  •  omeprazole  •  cetirizine  •  captopril  •  mag hydrox-al hydrox-simeth  •  aspirin EC  •  FLUoxetine  •  levothyroxine  •  lovastatin  •  montelukast  •  raloxifene  •  senna-docusate **AND** polyethylene glycol/lytes **AND** magnesium hydroxide **AND** bisacodyl  •  enoxaparin  •  ampicillin-sulbactam (UNASYN) IV  •  guaiFENesin dextromethorphan  •  ondansetron  •  ondansetron  •  nicotine **AND** Nicotine Replacement Patient Education Materials **AND** nicotine polacrilex  •  tiotropium  •  Respiratory Therapy Consult  •  guaiFENesin ER  •  acetaminophen  •  insulin regular **AND** POC blood glucose manual result **AND** NOTIFY MD and PharmD **AND** glucose **AND** dextrose 50%    Labs:  Recent Labs     09/04/21  0444 09/05/21  0258 09/06/21  0435   WBC 7.4 10.4 8.0   RBC 3.79* 4.25 3.79*   HEMOGLOBIN 12.3 13.7 12.4   HEMATOCRIT 38.0 41.8 37.4   .3* 98.4* 98.7*   MCH 32.5 32.2 32.7   RDW 49.3 47.0 46.8   PLATELETCT 137* 217 216   MPV 11.0 11.2 10.8   NEUTSPOLYS 71.30 66.40 73.40*   LYMPHOCYTES 17.10* 23.40 18.30*   MONOCYTES 9.10 7.30 5.40   EOSINOPHILS 0.90 1.10 1.60   BASOPHILS 0.30 0.40 0.30     Recent Labs     09/06/21  0435   SODIUM 141   POTASSIUM 3.7   CHLORIDE 105   CO2 27   GLUCOSE 148*   BUN 13     Recent Labs     09/06/21  0435   CREATININE 0.94       Imaging:  CT-CHEST,ABDOMEN,PELVIS WITH    Result Date: 9/2/2021 9/2/2021 5:17 PM HISTORY/REASON FOR EXAM:  Abdominal pain, fever; evaluate for intra-abdominal abscess or infection in RUQ or LUQ (regions tender to palpation). e coli bacteremia.. TECHNIQUE/EXAM DESCRIPTION: CT scan of the chest, abdomen and pelvis with contrast. Thin-section helical scanning was obtained with intravenous contrast from the lung apices through the pubic symphysis to include the chest, abdomen and pelvis. 80 mL of Omnipaque 350 nonionic contrast was administered intravenously without complication. Low dose optimization technique was utilized  for this CT exam including automated exposure control and adjustment of the mA and/or kV according to patient size. COMPARISON: None. FINDINGS: CT Chest: Lungs: Hazy peripheral groundglass opacities throughout both lungs with interlobular septal thickening. Mediastinum/Marisabel: No significant adenopathy. Pleura: No pleural effusion. Cardiac: Heart normal in size without pericardial effusion. Vascular: No central pulmonary embolus. Soft tissues: Unremarkable. Bones: No acute or destructive process. Small hiatal hernia. CT Abdomen and Pelvis: Liver: Ill-defined peripheral wedge-shaped low attenuating areas throughout the liver. Spleen: Unremarkable. Pancreas: Unremarkable. Gallbladder: No calcified stones. Biliary: Nondilated. Adrenal glands: Normal. Kidneys: Ill-defined decreased enhancement in the left kidney. No hydronephrosis. Bowel: No obstruction or acute inflammation. Lymph nodes: No adenopathy. Vasculature: Unremarkable. Peritoneum: Unremarkable without ascites. Musculoskeletal: Moderate degenerative change of the lumbar spine. Pelvis: Limited visualization of the pelvis due to streak artifact from left hip prosthesis. No obvious fluid collection identified.     1. Small peripheral ill-defined wedge-shaped low attenuating areas throughout the liver could relate to small infarcts secondary to septic emboli. 2. Ill-defined decreased enhancement in the left kidney is nonspecific and could be seen with pyelonephritis. Renal infarct is in the differential but this appearance is not typical. 3. Hazy peripheral groundglass opacities throughout both lungs with interlobular septal thickening are nonspecific but could relate to infection as well.    DX-CHEST-PORTABLE (1 VIEW)    Result Date: 8/31/2021 8/31/2021 12:54 AM HISTORY/REASON FOR EXAM: Shortness of Breath. TECHNIQUE/EXAM DESCRIPTION AND NUMBER OF VIEWS: Single AP view of the chest. COMPARISON: September 15, 2018 FINDINGS: Hardware: No change. Axillary and chest  wall vascular clips bilaterally Lungs: Reticular pulmonary opacity again seen. Some new ill-defined right midlung zone opacity. Subpleural right apical opacity is seen. Pleura:  No pleural space process is seen. Heart and mediastinum: There is mild cardiac silhouette enlargement.     Interstitial opacity compatible with underlying scarring Some additional consolidative opacity right midlung zone could indicate bacterial or Covid pneumonia Right apex subpleural opacity most likely from pleural-parenchymal scarring. Malignant nodule or infection cannot be excluded    EC-ECHOCARDIOGRAM COMPLETE W/O CONT    Result Date: 9/3/2021  Transthoracic Echo Report Echocardiography Laboratory CONCLUSIONS No prior study is available for comparison. Left ventricular ejection fraction is visually estimated to be 60%. Normal diastolic function. Normal right ventricular size and systolic function. ANILA VALENTINO Exam Date:         2021                    15:03 Exam Location:     Inpatient Priority:          Routine Ordering Physician:        PAPITO KEANE Referring Physician:       [id number] Booker Sonographer:               Mone Medel [facility] Age:    70     Gender:    F MRN:    [id number] :    1950 BSA:    2.17   Ht (in):    68     Wt (lb):    231 Exam Type:     Complete Indications:     Endocarditis ICD Codes:       421 CPT Codes:       45474 BP:   128    /   74     HR:   61 Technical Quality:       Fair MEASUREMENTS  (Male / Female) Normal Values 2D ECHO LV Diastolic Diameter PLAX        4.4 cm                4.2 - 5.9 / 3.9 - 5.3 cm LV Systolic Diameter PLAX         3.3 cm                2.1 - 4.0 cm IVS Diastolic Thickness           1.1 cm                LVPW Diastolic Thickness          1.1 cm                LVOT Diameter                     2.2 cm                Estimated LV Ejection Fraction    60 %                  LV Ejection Fraction MOD BP       75.5 %                >= 55  % LV Ejection Fraction MOD 4C       65.9  %                LV Ejection Fraction MOD 2C       79.6 %                IVC Diameter                      1.6 cm                DOPPLER AV Peak Velocity                  1.3 m/s               AV Peak Gradient                  7.1 mmHg              AV Mean Gradient                  4.1 mmHg              LVOT Peak Velocity                0.98 m/s              AV Area Cont Eq vti               2.8 cm2               Mitral E Point Velocity           0.9 m/s               Mitral E to A Ratio               1.2                   MV Pressure Half Time             63.8 ms               MV Area PHT                       3.4 cm2               MV Deceleration Time              220 ms                * Indicates values subject to auto-interpretation LV EF:  60    % FINDINGS Left Ventricle Normal left ventricular size and systolic function. Mild concentric left ventricular hypertrophy. Normal regional wall motion. Left ventricular ejection fraction is visually estimated to be 60%. Normal diastolic function. Right Ventricle Normal right ventricular size and systolic function. Right Atrium Normal right atrial size. Normal inferior vena cava size and inspiratory collapse. Left Atrium Normal left atrial size. Left atrial volume index is 24  mL/sq m. Mitral Valve The mitral valve is not well visualized. No stenosis or regurgitation seen. Aortic Valve The aortic valve is not well visualized. No stenosis or regurgitation seen. Tricuspid Valve The tricuspid valve is not well visualized. No stenosis or regurgitation seen. Right atrial pressure is estimated to be 3 mmHg. Unable to estimate pulmonary artery pressure due to an inadequate tricuspid regurgitant jet. Pulmonic Valve The pulmonic valve is not well visualized. Pericardium No pericardial effusion seen. Aorta The aortic root is normal.  Ascending aorta not well visualized. Frank Allan MD (Electronically Signed) Final Date:     03 September 2021                  16:23    EC-MIMI W/O CONT    Result Date: 2021  Transesophageal Echo Report Echocardiography Laboratory CONCLUSIONS Normal esophageal echocardiogram. No evidence of endocarditis. ANILA VALENTINO Exam Date:          2021                     09:54 Exam Location:      Inpatient Priority:            Routine Ordering Physician:        LUIS FELIPE TOBIN                             ([id number]) Referring Physician:       [id number] Pillai Sonographer:               Jovita MCELROY Age:    70     Gender:    F MRN:    [id number] :    1950 BSA:           Ht (in):           Wt (lb): Report Type:      Complete Indications:     Endocarditis and heart valve disorders in diseases                  classified elsewhere ICD Codes:       I39 CPT Codes:       44340 BP:   149    /   82     HR:   62 Technical Quality:       Good MEASUREMENTS  (Male / Female) Normal Values 2D ECHO Estimated LV Ejection Fraction    60 %                  * Indicates values subject to auto-interpretation LV EF:  60    % Medications I personally supervised moderate sedation from 10:10 to 10:30 AM. The patient was administered 4 mg of versed and 50 mcg of fentanyl intravenously in divided doses. Limitations none Complications none Proc. Components The probe was inserted and manipulated by Dr Espino. Probe [id number]  was used for this procedure. FINDINGS Left Ventricle The left ventricle was normal in size and function. Right Ventricle The right ventricle was normal in size and function. Right Atrium The right atrium is normal in size. Left Atrium The left atrium is normal in size. LA Appendage Normal left atrial appendage. IA Septum The interatrial septum is normal. IV Septum The interventricular septum is normal. Mitral Valve Structurally normal mitral valve without significant stenosis or regurgitation.  No valvular vegetations. Aortic Valve Structurally normal aortic valve without significant stenosis or regurgitation.  No valvular vegetations.  "Tricuspid Valve Structurally normal tricuspid valve without significant stenosis or regurgitation.  No valvular vegetations. Pulmonic Valve Structurally normal pulmonic valve without significant stenosis or regurgitation.  No valvular vegetations. Pericardium Normal pericardium without effusion. Aorta The aortic root is normal. Leif Espino MD (Electronically Signed) Final Date:     05 September 2021                 12:14      Micro:  Results     Procedure Component Value Units Date/Time    BLOOD CULTURE [[id number]] Collected: 09/06/21 0435    Order Status: Completed Specimen: Blood from Peripheral Updated: 09/06/21 0737    Narrative:      Per Hospital Policy: Only change Specimen Src: to \"Line\" if  specified by physician order.    BLOOD CULTURE [[id number]] Collected: 09/06/21 0435    Order Status: Completed Specimen: Blood from Peripheral Updated: 09/06/21 0736    Narrative:      Per Hospital Policy: Only change Specimen Src: to \"Line\" if  specified by physician order.    BLOOD CULTURE [[id number]]  (Abnormal) Collected: 09/03/21 0319    Order Status: Completed Specimen: Blood from Peripheral Updated: 09/05/21 1006     Significant Indicator POS     Source BLD     Site PERIPHERAL     Culture Result Growth detected by Bactec instrument. 09/04/2021  08:57      Escherichia coli  See previous culture for sensitivity report.      Narrative:      CALL  Lane  141 tel. [phone],  CALLED  141 tel. [phone] 09/04/2021, 08:58, RB PERF. RESULTS CALLED  TO:[phone]  Per Hospital Policy: Only change Specimen Src: to \"Line\" if  specified by physician order.  Right AC    BLOOD CULTURE [[id number]]  (Abnormal) Collected: 09/01/21 1347    Order Status: Completed Specimen: Blood from Peripheral Updated: 09/04/21 0902     Significant Indicator POS     Source BLD     Site PERIPHERAL     Culture Result Growth detected by Bactec instrument. 09/02/2021  07:26      Escherichia coli  See previous culture for sensitivity report.      " "Narrative:      CALL  Lane  141 tel. [phone],  CALLED  141 tel. [phone] 09/02/2021, 07:26, RB PERF. RESULTS CALLED  TO:[phone] PRISCILLA Alvarez  Per Hospital Policy: Only change Specimen Src: to \"Line\" if  specified by physician order.  Left Hand    BLOOD CULTURE [[id number]]  (Abnormal)  (Susceptibility) Collected: 09/01/21 1347    Order Status: Completed Specimen: Blood from Peripheral Updated: 09/04/21 0902     Significant Indicator POS     Source BLD     Site PERIPHERAL     Culture Result Growth detected by Bactec instrument. 09/02/2021  06:22      Escherichia coli    Narrative:      CALL  Lane  141 tel. [phone],  CALLED  141 tel. [phone] 09/02/2021, 06:25, RB PERF. RESULTS CALLED TO:  [phone], RN  Per Hospital Policy: Only change Specimen Src: to \"Line\" if  specified by physician order.  Left Forearm/Arm    Susceptibility     Escherichia coli (1)     Antibiotic Interpretation Microscan Method Status    Ampicillin Sensitive <=8 mcg/mL KALYN Final    Ceftriaxone Sensitive <=1 mcg/mL KALYN Final    Cefazolin Sensitive <=2 mcg/mL KALYN Final    Ciprofloxacin Sensitive <=0.25 mcg/mL KALYN Final    Cefepime Sensitive <=2 mcg/mL KALYN Final    Cefuroxime Sensitive <=4 mcg/mL KALYN Final    Ampicillin/sulbactam Sensitive <=4/2 mcg/mL KALYN Final    Tobramycin Sensitive <=2 mcg/mL KALYN Final    Ertapenem Sensitive <=0.5 mcg/mL KALYN Final    Gentamicin Sensitive <=2 mcg/mL KALYN Final    Moxifloxacin Sensitive <=2 mcg/mL KALYN Final    Pip/Tazobactam Sensitive <=8 mcg/mL KALYN Final    Trimeth/Sulfa Sensitive <=0.5/9.5 mcg/mL KALYN Final    Tigecycline Sensitive <=2 mcg/mL KALYN Final                   BLOOD CULTURE [[id number]] Collected: 09/03/21 0319    Order Status: Completed Specimen: Blood from Peripheral Updated: 09/04/21 0648     Significant Indicator NEG     Source BLD     Site PERIPHERAL     Culture Result No Growth  Note: Blood cultures are incubated for 5 days and  are monitored continuously.Positive blood cultures  are called to the " "RN and reported as soon as  they are identified.      Narrative:      Per Hospital Policy: Only change Specimen Src: to \"Line\" if  specified by physician order.  Right Wrist    URINE CULTURE-EXISTING-LESS THAN 48 HOURS [[id number]] Collected: 08/31/21 0634    Order Status: Completed Specimen: Urine, Clean Catch Updated: 09/03/21 1018     Significant Indicator NEG     Source UR     Site URINE, CLEAN CATCH     Culture Result Usual urogenital sammie ,000 cfu/mL    Narrative:      Please obtain from prior Urine sample  Indication for culture:->New onset fever with no other  identified cause  Please obtain from prior Urine sample    BLOOD CULTURE [[id number]]  (Abnormal) Collected: 08/31/21 0230    Order Status: Completed Specimen: Blood from Peripheral Updated: 09/02/21 1017     Significant Indicator POS     Source BLD     Site PERIPHERAL     Culture Result Growth detected by Bactec instrument. 08/31/2021  15:42      Escherichia coli  See previous culture for sensitivity report.      Narrative:      CALL  Lane  141 tel. [phone],  CALLED  141 tel. [phone] 08/31/2021, 15:43, RB PERF. RESULTS CALLED  TO:[phone] RN  Per Hospital Policy: Only change Specimen Src: to \"Line\" if  specified by physician order.  No site indicated    BLOOD CULTURE [[id number]]  (Abnormal)  (Susceptibility) Collected: 08/31/21 0220    Order Status: Completed Specimen: Blood from Peripheral Updated: 09/02/21 1015     Significant Indicator POS     Source BLD     Site PERIPHERAL     Culture Result Growth detected by Bactec instrument. 08/31/2021  15:40      Escherichia coli    Narrative:      CALL  Lane  141 tel. [phone],  CALLED  141 tel. [phone] 08/31/2021, 15:42, RB PERF. RESULTS CALLED  TO:[phone] RN  Per Hospital Policy: Only change Specimen Src: to \"Line\" if  specified by physician order.  No site indicated    Susceptibility     Escherichia coli (1)     Antibiotic Interpretation Microscan Method Status    Ampicillin Sensitive <=8 " mcg/mL KALYN Final    Ceftriaxone Sensitive <=1 mcg/mL KLAYN Final    Cefazolin Sensitive <=2 mcg/mL KALYN Final    Ciprofloxacin Sensitive <=0.25 mcg/mL KALYN Final    Cefepime Sensitive <=2 mcg/mL KALYN Final    Cefuroxime Sensitive <=4 mcg/mL KALYN Final    Ampicillin/sulbactam Sensitive <=4/2 mcg/mL KALYN Final    Tobramycin Sensitive <=2 mcg/mL KALYN Final    Ertapenem Sensitive <=0.5 mcg/mL KALYN Final    Gentamicin Sensitive <=2 mcg/mL KALYN Final    Moxifloxacin Sensitive <=2 mcg/mL KALYN Final    Pip/Tazobactam Sensitive <=8 mcg/mL KALYN Final    Trimeth/Sulfa Sensitive <=0.5/9.5 mcg/mL KALYN Final    Tigecycline Sensitive <=2 mcg/mL KALYN Final                   MRSA By PCR (Amp) [[id number]] Collected: 08/31/21 1709    Order Status: Completed Specimen: Respirate Updated: 08/31/21 2104     Significant Indicator NEG     Source RESP     Site Nares     MRSA PCR Negative for MRSA by PCR.    Urinalysis [[id number]]  (Abnormal) Collected: 08/31/21 0604    Order Status: Completed Specimen: Urine, Clean Catch Updated: 08/31/21 0626     Color DK Yellow     Character Turbid     Specific Gravity 1.020     Ph 5.5     Glucose Negative mg/dL      Ketones Trace mg/dL      Protein 100 mg/dL      Bilirubin Negative     Urobilinogen, Urine 1.0     Nitrite Positive     Leukocyte Esterase Large     Occult Blood Moderate     Micro Urine Req Microscopic    Narrative:      If not done within the last 24 hours    MRSA By PCR (Amp) [[id number]]     Order Status: Canceled Specimen: Sputum from Nares     COV-2, FLU A/B, AND RSV BY PCR (2-4 HOURS CEPHEID): Collect NP swab in VTM [[id number]] Collected: 08/31/21 0132    Order Status: Completed Specimen: Respirate from Nasopharyngeal Updated: 08/31/21 0312     Influenza virus A RNA Negative     Influenza virus B, PCR Negative     RSV, PCR Negative     SARS-CoV-2 by PCR NotDetected     Comment: PATIENTS: Important information regarding your results and instructions can  be found at  "[email or web address]   \"After your  Covid-19 Test\"    [facility] providers: PLEASE REFER TO DE-ESCALATION AND RETESTING PROTOCOL  on [email or web address]    **The [facility] Gene[facility]pert Xpress SARS-CoV-2 RT-PCR Test has been made  available for use under the Emergency Use Authorization (EUA) only.          SARS-CoV-2 Source NP Swab    Narrative:      Have you been in close contact with a person who is suspected  or known to be positive for COVID-19 within the last 30 days  (e.g. last seen that person < 30 days ago)->Unknown    Culture Respiratory W/ Grm Stn [[id number]] Collected: 08/31/21 0000    Order Status: Canceled Specimen: Sputum           Assessment:  Active Hospital Problems    Diagnosis    • *Bacteremia [R78.81]    • MICHAELA (obstructive sleep apnea) [G47.33]    • Sepsis (HCC) [A41.9]    • Thrombocytopenia (HCC) [D69.6]    • COPD (chronic obstructive pulmonary disease) ([facility]) [J44.9]    • Acute on chronic respiratory failure with hypoxia ([facility]) [J96.21]    • Tobacco abuse [Z72.0]    • Obesity [E66.9]    • Hypothyroid [E03.9]    • Uncontrolled type 1 diabetes mellitus (HCC) [E10.65]      Interval 24 hours:      AF, O2 3 L NC, stable   Labs reviewed  Imaging personally reviewed both images and report.   Micro reviewed    Patient complaining of some back pain that she states is chronic and unchanged.  Patient continued on antibiotics as below.       Assessment:  70-year-old female patient with chronic hypoxemic respiratory failure on 4 L oxygen at home, type 1 diabetes, obesity, MICHAELA, presented with cough and shortness of breath, was found to have sepsis with E. coli bacteremia.  The radiologist did interpret a possible right middle lobe pneumonia.  However, despite being on appropriate antibiotics, repeat blood cultures x2 were positive for pansensitive E. coli which is unusual in the absence of source control issues.  Patient does have a right upper quadrant tenderness on exam.     Pertinent " Diagnoses:  E. coli bacteremia, persistent  Type 1 diabetes  Chronic hypoxemic respiratory failure  MICHAELA  Obesity     Plan:  -Stop Unasyn as she has completed pneumonia course and transition to ceftriaxone 2 grams daily -we will plan on a 2-week antibiotic course given the persistent positive blood cultures.  Patient does have oral options so no need for central line.  -Blood cultures positive from 8/31, 9/1, 9/3   -Repeat blood cultures from 9/6 are so far no growth   -CT chest abdomen pelvis with no source control issues.  Noted multiple small wedge-shaped lesions in the liver concerning for hepatic infarcts from septic embolization  -TTE with no obvious valvular vegetations  -MIMI with no vegetations or significant valvular disease      iscussed with internal medicine, Dr. Jarquin.      ID will follow.  Please call with questions.

## 2021-09-06 NOTE — PROGRESS NOTES
"Hospital Medicine Daily Progress Note    Date of Service  9/6/2021    Chief Complaint  Michelle Espinoza is a 70 y.o. female admitted 8/31/2021 with shortness of breath, fevers and myalgias    Hospital Course  Patient is a 70 year old female with history of nicotine dependence, insulin dependent diabetes, copd with chronic hypoxic respiratory failure on 4L (5 with exertion at baseline), MICHAELA and unspecified psychiatric problem.  She presented to the ER with fevers and increasing sob.  She was found to have right sided pneumonia and was started on broad spectrum antibiotics. She was also thought to have copd exacerbation and was started on steroids for this.    Interval Problem Update  Axox3. Pt denies pain, her breathing \"feels better than normal\", mild chronic intermittent cough. No chest pain. Per nursing, there was a med error earlier, another nurse was trying to help reduce work load and administered lispro insulin to pt - it is not on her mar and was intended for another patient - incident report filed - pt now has mild hypoglycemia but no symptoms, patient is aware of events - will hold further long acting insulin today and continue to follow slowly. ROS otherwise negative.    I have personally seen and examined the patient at bedside. I discussed the plan of care with patient, nursing, cardiology and ID.    Consultants/Specialty  ID    Code Status  Full Code    Disposition  Patient is not medically cleared.   Anticipate discharge to home  I have placed the appropriate orders for post-discharge needs.    Review of Systems  Review of Systems   Constitutional: Negative.  Negative for chills, diaphoresis, fever, malaise/fatigue and weight loss.   HENT: Negative.  Negative for sore throat.    Eyes: Negative.  Negative for blurred vision.   Respiratory: Positive for cough, sputum production and shortness of breath.    Cardiovascular: Negative.  Negative for chest pain, palpitations and leg swelling. "   Gastrointestinal: Negative.  Negative for abdominal pain, nausea and vomiting.   Genitourinary: Negative.  Negative for dysuria.   Musculoskeletal: Negative.  Negative for myalgias.   Skin: Negative.  Negative for itching and rash.   Neurological: Negative.  Negative for dizziness, focal weakness, weakness and headaches.   Endo/Heme/Allergies: Negative.  Does not bruise/bleed easily.   Psychiatric/Behavioral: Negative.  Negative for depression, substance abuse and suicidal ideas.   All other systems reviewed and are negative.       Physical Exam  Temp:  [36.1 °C (96.9 °F)-37.2 °C (98.9 °F)] 36.3 °C (97.4 °F)  Pulse:  [62-80] 72  Resp:  [17-18] 18  BP: (116-153)/(72-94) 132/72  SpO2:  [93 %-97 %] 93 %    Physical Exam  Vitals and nursing note reviewed. Exam conducted with a chaperone present.   Constitutional:       General: She is not in acute distress.     Appearance: Normal appearance. She is not diaphoretic.   HENT:      Head: Normocephalic.      Nose: Nose normal. No congestion.      Mouth/Throat:      Mouth: Mucous membranes are moist.      Pharynx: Oropharynx is clear. No oropharyngeal exudate.   Eyes:      General:         Right eye: No discharge.         Left eye: No discharge.      Pupils: Pupils are equal, round, and reactive to light.   Neck:      Vascular: No carotid bruit.   Cardiovascular:      Rate and Rhythm: Normal rate and regular rhythm.      Pulses: Normal pulses.      Heart sounds: Normal heart sounds. No murmur heard.     Pulmonary:      Effort: Pulmonary effort is normal. No respiratory distress.      Breath sounds: Normal breath sounds. No wheezing or rhonchi.   Abdominal:      General: Abdomen is flat. Bowel sounds are normal. There is no distension.      Palpations: Abdomen is soft. There is no mass.      Tenderness: There is no abdominal tenderness. There is no guarding.   Musculoskeletal:         General: No swelling, tenderness or deformity. Normal range of motion.      Cervical back:  No rigidity or tenderness.   Lymphadenopathy:      Cervical: No cervical adenopathy.   Skin:     General: Skin is warm and dry.      Capillary Refill: Capillary refill takes less than 2 seconds.      Coloration: Skin is not jaundiced or pale.      Findings: No bruising or erythema.   Neurological:      General: No focal deficit present.      Mental Status: She is alert and oriented to person, place, and time.      Cranial Nerves: No cranial nerve deficit.   Psychiatric:         Mood and Affect: Mood normal.         Behavior: Behavior normal.         Fluids    Intake/Output Summary (Last 24 hours) at 9/6/2021 1255  Last data filed at 9/6/2021 0755  Gross per 24 hour   Intake 560 ml   Output --   Net 560 ml       Laboratory  Recent Labs     09/04/21  0444 09/05/21  0258 09/06/21  0435   WBC 7.4 10.4 8.0   RBC 3.79* 4.25 3.79*   HEMOGLOBIN 12.3 13.7 12.4   HEMATOCRIT 38.0 41.8 37.4   .3* 98.4* 98.7*   MCH 32.5 32.2 32.7   MCHC 32.4* 32.8* 33.2*   RDW 49.3 47.0 46.8   PLATELETCT 137* 217 216   MPV 11.0 11.2 10.8     Recent Labs     09/06/21  0435   SODIUM 141   POTASSIUM 3.7   CHLORIDE 105   CO2 27   GLUCOSE 148*   BUN 13   CREATININE 0.94   CALCIUM 8.6                   Imaging  EC-MIMI W/O CONT   Final Result      EC-ECHOCARDIOGRAM COMPLETE W/O CONT   Final Result      CT-CHEST,ABDOMEN,PELVIS WITH   Final Result         1. Small peripheral ill-defined wedge-shaped low attenuating areas throughout the liver could relate to small infarcts secondary to septic emboli.         2. Ill-defined decreased enhancement in the left kidney is nonspecific and could be seen with pyelonephritis. Renal infarct is in the differential but this appearance is not typical.      3. Hazy peripheral groundglass opacities throughout both lungs with interlobular septal thickening are nonspecific but could relate to infection as well.      DX-CHEST-PORTABLE (1 VIEW)   Final Result      Interstitial opacity compatible with underlying  scarring      Some additional consolidative opacity right midlung zone could indicate bacterial or Covid pneumonia      Right apex subpleural opacity most likely from pleural-parenchymal scarring. Malignant nodule or infection cannot be excluded           Assessment/Plan  * Bacteremia  Assessment & Plan  Ecoli, pan sensitive but persistent  Blood culture on 8/31+ for E. Coli  Blood culture on 9/1+ for E. Coli  Blood culture on 9/3 also positive for E. coli  CT abdomen showed possible septic emboli on the liver  TTE and MIMI did not show any vegetation  ID following and awaiting culture results  Continue unasyn for now  Awaiting repeat culture results      MICHAELA (obstructive sleep apnea)  Assessment & Plan  Cpap ordered, pt having trouble tolerating hospital machine    Thrombocytopenia (HCC)- (present on admission)  Assessment & Plan  Mild  Improving  Monitor intermittently    Sepsis (HCC)- (present on admission)  Assessment & Plan  This is Sepsis Present on admission  SIRS criteria identified on my evaluation include: Tachycardia, with heart rate greater than 90 BPM, Tachypnea, with respirations greater than 20 per minute and Leukocytosis, with WBC greater than 12,000  Source is Respiratory  Sepsis protocol initiated  IV antibiotics as appropriate for source of sepsis  While organ dysfunction may be noted elsewhere in this problem list or in the chart, degree of organ dysfunction does not meet CMS criteria for severe sepsis  Sputum culture/blood culture x2, mycoplasma PCR, urine/strep urinary antigen ordered and pending  IV antibiotics initiated in ED and will continue onward.  Procalcitonin ordered and pending.  Significant for middle lobar pneumonia, influenza A/B, RSV and Covid negative.  MRSA probe ordered and pending.    Empiric vanc stopped  MIMI is negative  Remains clinically stable but persistent ecoli bacteremia (pan sensitive)    COPD (chronic obstructive pulmonary disease) (HCC)- (present on  admission)  Assessment & Plan  Continue supplemental oxygen to maintain SPO2 greater than 88%  Now back at baseline  Completed steroid taper  Duo nebs as needed  Encouraged the patient to quit smoking    Acute on chronic respiratory failure with hypoxia (HCC)- (present on admission)  Assessment & Plan  Continues to use tobacco however in agreement with tobacco cessation protocol.  Maintain SPO2 greater than 88%  Continue steroid taper and continue inhalers  Use 4 L at rest at home, she is on by her baseline  Chest x-ray reveals middle right lobar pneumonia  Influenza A/B, RSV and Covid negative  Improving and continue inhalers    Tobacco abuse- (present on admission)  Assessment & Plan  6 minutes tobacco cessation counseling commenced at bedside.  Patient explained and educated the risks of smoking.  Education provided, in agreement with alternatives such as nicotine patch, Nicorette gum.  Strong recommendation for outpatient follow-up with smoking cessation program at discharge as patient still has urges to smoke however in agreement with cessation.    Obesity- (present on admission)  Assessment & Plan  Strong recommendation for outpatient follow-up with PCP for lifestyle modification including diet and exercise regimen of at least 30 minutes brisk cardiovascular exercise 3-5 times per week.    Hypothyroid- (present on admission)  Assessment & Plan  Continue Synthroid 112 mcg p.o. daily  Avoid calcium supplementation within 1 hour of administration    Uncontrolled type 1 diabetes mellitus (HCC)- (present on admission)  Assessment & Plan  Hemoglobin A1c of 6.1 as of July 2021  Continue long acting (adjusting dose see above), SSI plus additional short acting with carb counting  Continue to follow  See above for further details   Discussed with nursing  Continue to follow       VTE prophylaxis: enoxaparin ppx    I have performed a physical exam and reviewed and updated ROS and Plan today (9/6/2021). In review of  yesterday's note (9/5/2021), there are no changes except as documented above.

## 2021-09-06 NOTE — PROGRESS NOTES
2 RN skin check complete.   Devices in place: NC.   Skin assessed under devices: above.     Scattered redness/bruising to BUE/abdomen/BLE. Blanchable redness to right ear. Dry, cracked heels. No other skin demarcations/issues noted/reported.     Confirmed pressure ulcers found on: NA.   New potential pressure ulcers noted on: NA.   Wound consult placed: NA.     The following interventions in place: Pillows in place for positioning.

## 2021-09-06 NOTE — CARE PLAN
The patient is Stable - Low risk of patient condition declining or worsening    Shift Goals  Clinical Goals: Rest  Patient Goals: Rest    Progress made toward(s) clinical / shift goals:  Patient transferred back from T8 mid shift. Denied pain. Patient up self, steady gait. Patient resting intermittently at present time.     Patient is not progressing towards the following goals: NA.

## 2021-09-06 NOTE — PROGRESS NOTES
Received report from NOC RN from T8. Patient arrived via transport in hospital bed. A&O x 4. VSS. 94% on 3L NC. Patient denied SOB, numbness, tingling, nausea, vomiting, pain. Patient reported generalized weakness. Patient reported expectorating cough. IS being utilized. Abdomen round, soft. Hypoactive BS x 4 noted. + eructation. + flatus. + void. LBM 09/05. Patient stated tolerating diet, but doesn't like food. POC discussed. Questions answered. Bed locked/lowest position. Call light/personal belongings within reach. All needs met/patient sleeping at present time.

## 2021-09-07 LAB
GLUCOSE BLD-MCNC: 126 MG/DL (ref 65–99)
GLUCOSE BLD-MCNC: 163 MG/DL (ref 65–99)
GLUCOSE BLD-MCNC: 182 MG/DL (ref 65–99)
GLUCOSE BLD-MCNC: 235 MG/DL (ref 65–99)
GLUCOSE BLD-MCNC: 268 MG/DL (ref 65–99)
GLUCOSE BLD-MCNC: 313 MG/DL (ref 65–99)

## 2021-09-07 PROCEDURE — 700102 HCHG RX REV CODE 250 W/ 637 OVERRIDE(OP): Performed by: STUDENT IN AN ORGANIZED HEALTH CARE EDUCATION/TRAINING PROGRAM

## 2021-09-07 PROCEDURE — A9270 NON-COVERED ITEM OR SERVICE: HCPCS | Performed by: INTERNAL MEDICINE

## 2021-09-07 PROCEDURE — A9270 NON-COVERED ITEM OR SERVICE: HCPCS | Performed by: STUDENT IN AN ORGANIZED HEALTH CARE EDUCATION/TRAINING PROGRAM

## 2021-09-07 PROCEDURE — 700111 HCHG RX REV CODE 636 W/ 250 OVERRIDE (IP): Performed by: STUDENT IN AN ORGANIZED HEALTH CARE EDUCATION/TRAINING PROGRAM

## 2021-09-07 PROCEDURE — 700102 HCHG RX REV CODE 250 W/ 637 OVERRIDE(OP): Performed by: INTERNAL MEDICINE

## 2021-09-07 PROCEDURE — 94640 AIRWAY INHALATION TREATMENT: CPT

## 2021-09-07 PROCEDURE — 700102 HCHG RX REV CODE 250 W/ 637 OVERRIDE(OP): Performed by: HOSPITALIST

## 2021-09-07 PROCEDURE — A9270 NON-COVERED ITEM OR SERVICE: HCPCS | Performed by: HOSPITALIST

## 2021-09-07 PROCEDURE — 99232 SBSQ HOSP IP/OBS MODERATE 35: CPT | Performed by: STUDENT IN AN ORGANIZED HEALTH CARE EDUCATION/TRAINING PROGRAM

## 2021-09-07 PROCEDURE — 770006 HCHG ROOM/CARE - MED/SURG/GYN SEMI*

## 2021-09-07 PROCEDURE — 82962 GLUCOSE BLOOD TEST: CPT | Mod: 91

## 2021-09-07 PROCEDURE — 99233 SBSQ HOSP IP/OBS HIGH 50: CPT | Performed by: INTERNAL MEDICINE

## 2021-09-07 RX ORDER — DEXTROSE MONOHYDRATE 25 G/50ML
50 INJECTION, SOLUTION INTRAVENOUS
Status: DISCONTINUED | OUTPATIENT
Start: 2021-09-07 | End: 2021-09-08 | Stop reason: HOSPADM

## 2021-09-07 RX ORDER — DEXTROSE MONOHYDRATE 25 G/50ML
50 INJECTION, SOLUTION INTRAVENOUS
Status: DISCONTINUED | OUTPATIENT
Start: 2021-09-07 | End: 2021-09-07

## 2021-09-07 RX ORDER — INSULIN LISPRO 100 [IU]/ML
1-20 INJECTION, SOLUTION INTRAVENOUS; SUBCUTANEOUS
Status: DISCONTINUED | OUTPATIENT
Start: 2021-09-07 | End: 2021-09-07

## 2021-09-07 RX ORDER — AMLODIPINE BESYLATE 10 MG/1
10 TABLET ORAL
Status: DISCONTINUED | OUTPATIENT
Start: 2021-09-07 | End: 2021-09-08 | Stop reason: HOSPADM

## 2021-09-07 RX ORDER — INSULIN LISPRO 100 [IU]/ML
1-20 INJECTION, SOLUTION INTRAVENOUS; SUBCUTANEOUS
Status: DISCONTINUED | OUTPATIENT
Start: 2021-09-07 | End: 2021-09-08 | Stop reason: HOSPADM

## 2021-09-07 RX ORDER — INSULIN LISPRO 100 [IU]/ML
3-14 INJECTION, SOLUTION INTRAVENOUS; SUBCUTANEOUS
Status: DISCONTINUED | OUTPATIENT
Start: 2021-09-07 | End: 2021-09-07

## 2021-09-07 RX ORDER — CIPROFLOXACIN 500 MG/1
500 TABLET, FILM COATED ORAL EVERY 12 HOURS
Status: DISCONTINUED | OUTPATIENT
Start: 2021-09-07 | End: 2021-09-08 | Stop reason: HOSPADM

## 2021-09-07 RX ORDER — INSULIN LISPRO 100 [IU]/ML
0.2 INJECTION, SOLUTION INTRAVENOUS; SUBCUTANEOUS
Status: DISCONTINUED | OUTPATIENT
Start: 2021-09-07 | End: 2021-09-07

## 2021-09-07 RX ADMIN — INSULIN LISPRO 10 UNITS: 100 INJECTION, SOLUTION INTRAVENOUS; SUBCUTANEOUS at 17:12

## 2021-09-07 RX ADMIN — RALOXIFENE 60 MG: 60 TABLET ORAL at 06:35

## 2021-09-07 RX ADMIN — TRAMADOL HYDROCHLORIDE 50 MG: 50 TABLET, FILM COATED ORAL at 17:41

## 2021-09-07 RX ADMIN — ALBUTEROL SULFATE 2 PUFF: 90 AEROSOL, METERED RESPIRATORY (INHALATION) at 08:49

## 2021-09-07 RX ADMIN — CAPTOPRIL 12.5 MG: 12.5 TABLET ORAL at 06:35

## 2021-09-07 RX ADMIN — CIPROFLOXACIN 500 MG: 500 TABLET, FILM COATED ORAL at 11:15

## 2021-09-07 RX ADMIN — DOCUSATE SODIUM 50 MG AND SENNOSIDES 8.6 MG 2 TABLET: 8.6; 5 TABLET, FILM COATED ORAL at 06:35

## 2021-09-07 RX ADMIN — TIOTROPIUM BROMIDE INHALATION SPRAY 5 MCG: 3.12 SPRAY, METERED RESPIRATORY (INHALATION) at 08:49

## 2021-09-07 RX ADMIN — GUAIFENESIN 1200 MG: 600 TABLET, EXTENDED RELEASE ORAL at 06:36

## 2021-09-07 RX ADMIN — TRAMADOL HYDROCHLORIDE 50 MG: 50 TABLET, FILM COATED ORAL at 03:46

## 2021-09-07 RX ADMIN — CETIRIZINE HYDROCHLORIDE 10 MG: 10 TABLET, FILM COATED ORAL at 06:35

## 2021-09-07 RX ADMIN — INSULIN LISPRO 12 UNITS: 100 INJECTION, SOLUTION INTRAVENOUS; SUBCUTANEOUS at 12:08

## 2021-09-07 RX ADMIN — TRAMADOL HYDROCHLORIDE 50 MG: 50 TABLET, FILM COATED ORAL at 11:15

## 2021-09-07 RX ADMIN — BUDESONIDE AND FORMOTEROL FUMARATE DIHYDRATE 2 PUFF: 80; 4.5 AEROSOL RESPIRATORY (INHALATION) at 08:49

## 2021-09-07 RX ADMIN — CETIRIZINE HYDROCHLORIDE 10 MG: 10 TABLET, FILM COATED ORAL at 17:43

## 2021-09-07 RX ADMIN — LOVASTATIN 20 MG: 20 TABLET ORAL at 20:59

## 2021-09-07 RX ADMIN — BUDESONIDE AND FORMOTEROL FUMARATE DIHYDRATE 2 PUFF: 80; 4.5 AEROSOL RESPIRATORY (INHALATION) at 20:59

## 2021-09-07 RX ADMIN — ACETAMINOPHEN 650 MG: 325 TABLET, FILM COATED ORAL at 11:16

## 2021-09-07 RX ADMIN — ASPIRIN 81 MG: 81 TABLET, COATED ORAL at 06:35

## 2021-09-07 RX ADMIN — GUAIFENESIN 1200 MG: 600 TABLET, EXTENDED RELEASE ORAL at 17:41

## 2021-09-07 RX ADMIN — ACETAMINOPHEN 650 MG: 325 TABLET, FILM COATED ORAL at 03:47

## 2021-09-07 RX ADMIN — MONTELUKAST 10 MG: 10 TABLET, FILM COATED ORAL at 06:35

## 2021-09-07 RX ADMIN — ENOXAPARIN SODIUM 40 MG: 40 INJECTION SUBCUTANEOUS at 06:35

## 2021-09-07 RX ADMIN — FLUOXETINE 20 MG: 20 CAPSULE ORAL at 06:35

## 2021-09-07 RX ADMIN — DOCUSATE SODIUM 50 MG AND SENNOSIDES 8.6 MG 2 TABLET: 8.6; 5 TABLET, FILM COATED ORAL at 17:40

## 2021-09-07 RX ADMIN — OMEPRAZOLE 20 MG: 20 CAPSULE, DELAYED RELEASE ORAL at 17:41

## 2021-09-07 RX ADMIN — ALBUTEROL SULFATE 2 PUFF: 90 AEROSOL, METERED RESPIRATORY (INHALATION) at 14:54

## 2021-09-07 RX ADMIN — INSULIN LISPRO 8 UNITS: 100 INJECTION, SOLUTION INTRAVENOUS; SUBCUTANEOUS at 08:45

## 2021-09-07 RX ADMIN — NICOTINE 14 MG: 14 PATCH TRANSDERMAL at 06:00

## 2021-09-07 RX ADMIN — FLUCONAZOLE 150 MG: 50 TABLET ORAL at 06:36

## 2021-09-07 RX ADMIN — AMLODIPINE BESYLATE 10 MG: 10 TABLET ORAL at 11:15

## 2021-09-07 RX ADMIN — OMEPRAZOLE 20 MG: 20 CAPSULE, DELAYED RELEASE ORAL at 06:35

## 2021-09-07 RX ADMIN — ACETAMINOPHEN 650 MG: 325 TABLET, FILM COATED ORAL at 17:41

## 2021-09-07 RX ADMIN — LEVOTHYROXINE SODIUM 112 MCG: 0.11 TABLET ORAL at 06:35

## 2021-09-07 RX ADMIN — CIPROFLOXACIN 500 MG: 500 TABLET, FILM COATED ORAL at 17:42

## 2021-09-07 ASSESSMENT — PAIN DESCRIPTION - PAIN TYPE
TYPE: CHRONIC PAIN

## 2021-09-07 ASSESSMENT — ENCOUNTER SYMPTOMS
CONSTIPATION: 0
ABDOMINAL PAIN: 0
COUGH: 1
MYALGIAS: 1
CHILLS: 0
SPEECH CHANGE: 0
DIARRHEA: 0
COUGH: 0
NERVOUS/ANXIOUS: 0
BACK PAIN: 1
FEVER: 0
VOMITING: 0
SPUTUM PRODUCTION: 0
NAUSEA: 0
SHORTNESS OF BREATH: 0
SENSORY CHANGE: 0

## 2021-09-07 NOTE — DISCHARGE PLANNING
Anticipated Discharge Disposition:   TBD    Action:   This RN CM is following the case. Per rounds with Dr Gracia, Pt is still pending I.D. clearance. Pt is pending C/S result.    Barriers to Discharge:   Pending medical clearance    Plan:   CM to continue to assist Pt with discharge as needed

## 2021-09-07 NOTE — PROGRESS NOTES
During final medication pass, patient remarked on having night sweats; which is new. Education provided regarding Rocephin. Verbalized understanding.

## 2021-09-07 NOTE — PROGRESS NOTES
"Received report from AM RN; assumed care. A&O x 4. BP elevated beginning/mid shift; patient sleeping. BP below parameters to give prn. 95% on 4L NC. IS being utilized. Patient has intermittent SOB. Patient stated bases of lungs better, but not expectorating; shifting mucus being felt. Patient denied numbness, tingling, nausea, vomiting. Abdomen round, soft. Normoactive BS x 4 noted. + void. +erucation. + flatus. LBM 09/06. Patient refused Monistat, stating she's \"71 years old and too old to do messy.\" Patient requested MD be contacted for Diflucan order. On call MD contacted. Order received. Yogurt ordered on trays. Discussed with patient. Patient refused slide scale insulin, stating she'd be fine until morning. POC discussed. Questions answered. Bed locked/lowest position. Call light/personal belongings within reach. All needs met/patient sleeping at present time.   "

## 2021-09-07 NOTE — PROGRESS NOTES
Infectious Disease Progress Note    Author: Brittanie Jones M.D. Date & Time of service: 2021  9:09 AM    Chief Complaint:  Follow-up for E. coli bacteremia     Interval History:   patient is afebrile, white count 7.4, repeat blood cultures x2 again positive despite appropriate antibiotics   patient remains afebrile, white count is 10.4, transferred to telemetry floor.  Patient is resting comfortably this morning.   AF, O2 3 L NC, complaining of some back pain that she states is chronic and unchanged.       Review of Systems:  Review of Systems   Constitutional: Negative for chills, fever and malaise/fatigue.   Respiratory: Positive for cough. Negative for sputum production and shortness of breath.    Gastrointestinal: Negative for abdominal pain, constipation, diarrhea, nausea and vomiting.   Musculoskeletal: Positive for back pain and myalgias.   Psychiatric/Behavioral: The patient is not nervous/anxious.        Hemodynamics:  Temp (24hrs), Av.4 °C (97.6 °F), Min:35.9 °C (96.6 °F), Max:36.7 °C (98.1 °F)  Temperature: 36.6 °C (97.9 °F)  Pulse  Av.8  Min: 59  Max: 117   Blood Pressure : 160/84       Physical Exam:  Physical Exam  Constitutional:       Appearance: Normal appearance.   Cardiovascular:      Rate and Rhythm: Normal rate.      Heart sounds: Normal heart sounds.   Pulmonary:      Effort: Pulmonary effort is normal.      Comments: Diminished breath sounds bilaterally  Abdominal:      General: Abdomen is flat. Bowel sounds are normal.      Palpations: Abdomen is soft.   Musculoskeletal:      Right lower leg: No edema.      Left lower leg: No edema.   Skin:     General: Skin is warm.   Neurological:      General: No focal deficit present.      Mental Status: She is alert and oriented to person, place, and time.   Psychiatric:         Mood and Affect: Mood normal.         Behavior: Behavior normal.         Meds:    Current Facility-Administered Medications:   •  insulin glargine  **AND** insulin lispro **AND** [DISCONTINUED] insulin lispro **AND** POC blood glucose manual result **AND** NOTIFY MD and PharmD **AND** glucose **AND** dextrose 50%  •  cefTRIAXone (ROCEPHIN) IV  •  albuterol  •  albuterol  •  budesonide-formoterol  •  traMADol  •  simethicone  •  enalaprilat  •  ipratropium-albuterol  •  omeprazole  •  cetirizine  •  captopril  •  mag hydrox-al hydrox-simeth  •  aspirin EC  •  FLUoxetine  •  levothyroxine  •  lovastatin  •  montelukast  •  raloxifene  •  senna-docusate **AND** polyethylene glycol/lytes **AND** magnesium hydroxide **AND** bisacodyl  •  enoxaparin  •  guaiFENesin dextromethorphan  •  ondansetron  •  ondansetron  •  nicotine **AND** Nicotine Replacement Patient Education Materials **AND** nicotine polacrilex  •  tiotropium  •  Respiratory Therapy Consult  •  guaiFENesin ER  •  acetaminophen    Labs:  Recent Labs     09/05/21  0258 09/06/21 0435   WBC 10.4 8.0   RBC 4.25 3.79*   HEMOGLOBIN 13.7 12.4   HEMATOCRIT 41.8 37.4   MCV 98.4* 98.7*   MCH 32.2 32.7   RDW 47.0 46.8   PLATELETCT 217 216   MPV 11.2 10.8   NEUTSPOLYS 66.40 73.40*   LYMPHOCYTES 23.40 18.30*   MONOCYTES 7.30 5.40   EOSINOPHILS 1.10 1.60   BASOPHILS 0.40 0.30     Recent Labs     09/06/21  0435   SODIUM 141   POTASSIUM 3.7   CHLORIDE 105   CO2 27   GLUCOSE 148*   BUN 13     Recent Labs     09/06/21 0435   CREATININE 0.94       Imaging:  CT-CHEST,ABDOMEN,PELVIS WITH    Result Date: 9/2/2021 9/2/2021 5:17 PM HISTORY/REASON FOR EXAM:  Abdominal pain, fever; evaluate for intra-abdominal abscess or infection in RUQ or LUQ (regions tender to palpation). e coli bacteremia.. TECHNIQUE/EXAM DESCRIPTION: CT scan of the chest, abdomen and pelvis with contrast. Thin-section helical scanning was obtained with intravenous contrast from the lung apices through the pubic symphysis to include the chest, abdomen and pelvis. 80 mL of Omnipaque 350 nonionic contrast was administered intravenously without complication.  Low dose optimization technique was utilized for this CT exam including automated exposure control and adjustment of the mA and/or kV according to patient size. COMPARISON: None. FINDINGS: CT Chest: Lungs: Hazy peripheral groundglass opacities throughout both lungs with interlobular septal thickening. Mediastinum/Marisabel: No significant adenopathy. Pleura: No pleural effusion. Cardiac: Heart normal in size without pericardial effusion. Vascular: No central pulmonary embolus. Soft tissues: Unremarkable. Bones: No acute or destructive process. Small hiatal hernia. CT Abdomen and Pelvis: Liver: Ill-defined peripheral wedge-shaped low attenuating areas throughout the liver. Spleen: Unremarkable. Pancreas: Unremarkable. Gallbladder: No calcified stones. Biliary: Nondilated. Adrenal glands: Normal. Kidneys: Ill-defined decreased enhancement in the left kidney. No hydronephrosis. Bowel: No obstruction or acute inflammation. Lymph nodes: No adenopathy. Vasculature: Unremarkable. Peritoneum: Unremarkable without ascites. Musculoskeletal: Moderate degenerative change of the lumbar spine. Pelvis: Limited visualization of the pelvis due to streak artifact from left hip prosthesis. No obvious fluid collection identified.     1. Small peripheral ill-defined wedge-shaped low attenuating areas throughout the liver could relate to small infarcts secondary to septic emboli. 2. Ill-defined decreased enhancement in the left kidney is nonspecific and could be seen with pyelonephritis. Renal infarct is in the differential but this appearance is not typical. 3. Hazy peripheral groundglass opacities throughout both lungs with interlobular septal thickening are nonspecific but could relate to infection as well.    DX-CHEST-PORTABLE (1 VIEW)    Result Date: 8/31/2021 8/31/2021 12:54 AM HISTORY/REASON FOR EXAM: Shortness of Breath. TECHNIQUE/EXAM DESCRIPTION AND NUMBER OF VIEWS: Single AP view of the chest. COMPARISON: September 15, 2018  FINDINGS: Hardware: No change. Axillary and chest wall vascular clips bilaterally Lungs: Reticular pulmonary opacity again seen. Some new ill-defined right midlung zone opacity. Subpleural right apical opacity is seen. Pleura:  No pleural space process is seen. Heart and mediastinum: There is mild cardiac silhouette enlargement.     Interstitial opacity compatible with underlying scarring Some additional consolidative opacity right midlung zone could indicate bacterial or Covid pneumonia Right apex subpleural opacity most likely from pleural-parenchymal scarring. Malignant nodule or infection cannot be excluded    EC-ECHOCARDIOGRAM COMPLETE W/O CONT    Result Date: 9/3/2021  Transthoracic Echo Report Echocardiography Laboratory CONCLUSIONS No prior study is available for comparison. Left ventricular ejection fraction is visually estimated to be 60%. Normal diastolic function. Normal right ventricular size and systolic function. VALENTINOANILA Exam Date:         2021                    15:03 Exam Location:     Inpatient Priority:          Routine Ordering Physician:        PAPITO KEANE Referring Physician:       [id number] Booker Sonographer:               Mone Medel [facility] Age:    70     Gender:    F MRN:    [id number] :    1950 BSA:    2.17   Ht (in):    68     Wt (lb):    231 Exam Type:     Complete Indications:     Endocarditis ICD Codes:       421 CPT Codes:       23761 BP:   128    /   74     HR:   61 Technical Quality:       Fair MEASUREMENTS  (Male / Female) Normal Values 2D ECHO LV Diastolic Diameter PLAX        4.4 cm                4.2 - 5.9 / 3.9 - 5.3 cm LV Systolic Diameter PLAX         3.3 cm                2.1 - 4.0 cm IVS Diastolic Thickness           1.1 cm                LVPW Diastolic Thickness          1.1 cm                LVOT Diameter                     2.2 cm                Estimated LV Ejection Fraction    60 %                  LV Ejection Fraction MOD BP       75.5 %                 >= 55  % LV Ejection Fraction MOD 4C       65.9 %                LV Ejection Fraction MOD 2C       79.6 %                IVC Diameter                      1.6 cm                DOPPLER AV Peak Velocity                  1.3 m/s               AV Peak Gradient                  7.1 mmHg              AV Mean Gradient                  4.1 mmHg              LVOT Peak Velocity                0.98 m/s              AV Area Cont Eq vti               2.8 cm2               Mitral E Point Velocity           0.9 m/s               Mitral E to A Ratio               1.2                   MV Pressure Half Time             63.8 ms               MV Area PHT                       3.4 cm2               MV Deceleration Time              220 ms                * Indicates values subject to auto-interpretation LV EF:  60    % FINDINGS Left Ventricle Normal left ventricular size and systolic function. Mild concentric left ventricular hypertrophy. Normal regional wall motion. Left ventricular ejection fraction is visually estimated to be 60%. Normal diastolic function. Right Ventricle Normal right ventricular size and systolic function. Right Atrium Normal right atrial size. Normal inferior vena cava size and inspiratory collapse. Left Atrium Normal left atrial size. Left atrial volume index is 24  mL/sq m. Mitral Valve The mitral valve is not well visualized. No stenosis or regurgitation seen. Aortic Valve The aortic valve is not well visualized. No stenosis or regurgitation seen. Tricuspid Valve The tricuspid valve is not well visualized. No stenosis or regurgitation seen. Right atrial pressure is estimated to be 3 mmHg. Unable to estimate pulmonary artery pressure due to an inadequate tricuspid regurgitant jet. Pulmonic Valve The pulmonic valve is not well visualized. Pericardium No pericardial effusion seen. Aorta The aortic root is normal.  Ascending aorta not well visualized. Frank Allan MD (Electronically Signed) Final Date:      2021                 16:23    EC-MIMI W/O CONT    Result Date: 2021  Transesophageal Echo Report Echocardiography Laboratory CONCLUSIONS Normal esophageal echocardiogram. No evidence of endocarditis. ANILA VALENTINO Exam Date:          2021                     09:54 Exam Location:      Inpatient Priority:            Routine Ordering Physician:        LUIS FELIPE TOBIN                             ([id number]) Referring Physician:       [id number] Pillai Sonographer:               Jovita MCELROY Age:    70     Gender:    F MRN:    [id number] :    1950 BSA:           Ht (in):           Wt (lb): Report Type:      Complete Indications:     Endocarditis and heart valve disorders in diseases                  classified elsewhere ICD Codes:       I39 CPT Codes:       24157 BP:   149    /   82     HR:   62 Technical Quality:       Good MEASUREMENTS  (Male / Female) Normal Values 2D ECHO Estimated LV Ejection Fraction    60 %                  * Indicates values subject to auto-interpretation LV EF:  60    % Medications I personally supervised moderate sedation from 10:10 to 10:30 AM. The patient was administered 4 mg of versed and 50 mcg of fentanyl intravenously in divided doses. Limitations none Complications none Proc. Components The probe was inserted and manipulated by Dr Espino. Probe [id number]  was used for this procedure. FINDINGS Left Ventricle The left ventricle was normal in size and function. Right Ventricle The right ventricle was normal in size and function. Right Atrium The right atrium is normal in size. Left Atrium The left atrium is normal in size. LA Appendage Normal left atrial appendage. IA Septum The interatrial septum is normal. IV Septum The interventricular septum is normal. Mitral Valve Structurally normal mitral valve without significant stenosis or regurgitation.  No valvular vegetations. Aortic Valve Structurally normal aortic valve without significant stenosis or regurgitation.  " No valvular vegetations. Tricuspid Valve Structurally normal tricuspid valve without significant stenosis or regurgitation.  No valvular vegetations. Pulmonic Valve Structurally normal pulmonic valve without significant stenosis or regurgitation.  No valvular vegetations. Pericardium Normal pericardium without effusion. Aorta The aortic root is normal. Leif Espino MD (Electronically Signed) Final Date:     05 September 2021                 12:14      Micro:  Results     Procedure Component Value Units Date/Time    BLOOD CULTURE [[id number]] Collected: 09/06/21 0435    Order Status: Completed Specimen: Blood from Peripheral Updated: 09/07/21 0811     Significant Indicator NEG     Source BLD     Site PERIPHERAL     Culture Result No Growth  Note: Blood cultures are incubated for 5 days and  are monitored continuously.Positive blood cultures  are called to the RN and reported as soon as  they are identified.      Narrative:      Per Hospital Policy: Only change Specimen Src: to \"Line\" if  specified by physician order.  Right AC    BLOOD CULTURE [[id number]] Collected: 09/06/21 0435    Order Status: Completed Specimen: Blood from Peripheral Updated: 09/07/21 0811     Significant Indicator NEG     Source BLD     Site PERIPHERAL     Culture Result No Growth  Note: Blood cultures are incubated for 5 days and  are monitored continuously.Positive blood cultures  are called to the RN and reported as soon as  they are identified.      Narrative:      Per Hospital Policy: Only change Specimen Src: to \"Line\" if  specified by physician order.  Left AC    BLOOD CULTURE [[id number]]  (Abnormal) Collected: 09/03/21 0319    Order Status: Completed Specimen: Blood from Peripheral Updated: 09/05/21 1006     Significant Indicator POS     Source BLD     Site PERIPHERAL     Culture Result Growth detected by Bactec instrument. 09/04/2021  08:57      Escherichia coli  See previous culture for sensitivity report.      Narrative:      " "CALL  Lane  141 tel. [phone],  CALLED  141 tel. [phone] 09/04/2021, 08:58, RB PERF. RESULTS CALLED  TO:[phone]  Per Hospital Policy: Only change Specimen Src: to \"Line\" if  specified by physician order.  Right AC    BLOOD CULTURE [[id number]]  (Abnormal) Collected: 09/01/21 1347    Order Status: Completed Specimen: Blood from Peripheral Updated: 09/04/21 0902     Significant Indicator POS     Source BLD     Site PERIPHERAL     Culture Result Growth detected by Bactec instrument. 09/02/2021  07:26      Escherichia coli  See previous culture for sensitivity report.      Narrative:      CALL  Lane  141 tel. [phone],  CALLED  141 tel. [phone] 09/02/2021, 07:26, RB PERF. RESULTS CALLED  TO:[phone] PRISCILLA Alvarez  Per Hospital Policy: Only change Specimen Src: to \"Line\" if  specified by physician order.  Left Hand    BLOOD CULTURE [[id number]]  (Abnormal)  (Susceptibility) Collected: 09/01/21 1347    Order Status: Completed Specimen: Blood from Peripheral Updated: 09/04/21 0902     Significant Indicator POS     Source BLD     Site PERIPHERAL     Culture Result Growth detected by Bactec instrument. 09/02/2021  06:22      Escherichia coli    Narrative:      CALL  Lane  141 tel. [phone],  CALLED  141 tel. [phone] 09/02/2021, 06:25, RB PERF. RESULTS CALLED TO:  [phone], RN  Per Hospital Policy: Only change Specimen Src: to \"Line\" if  specified by physician order.  Left Forearm/Arm    Susceptibility     Escherichia coli (1)     Antibiotic Interpretation Microscan Method Status    Ampicillin Sensitive <=8 mcg/mL KALYN Final    Ceftriaxone Sensitive <=1 mcg/mL KALYN Final    Cefazolin Sensitive <=2 mcg/mL KALYN Final    Ciprofloxacin Sensitive <=0.25 mcg/mL KALYN Final    Cefepime Sensitive <=2 mcg/mL KALYN Final    Cefuroxime Sensitive <=4 mcg/mL KALYN Final    Ampicillin/sulbactam Sensitive <=4/2 mcg/mL KALYN Final    Tobramycin Sensitive <=2 mcg/mL KALYN Final    Ertapenem Sensitive <=0.5 mcg/mL KALYN Final    Gentamicin Sensitive <=2 " "mcg/mL KALYN Final    Moxifloxacin Sensitive <=2 mcg/mL KALYN Final    Pip/Tazobactam Sensitive <=8 mcg/mL KALYN Final    Trimeth/Sulfa Sensitive <=0.5/9.5 mcg/mL KALYN Final    Tigecycline Sensitive <=2 mcg/mL KALYN Final                   BLOOD CULTURE [[id number]] Collected: 09/03/21 0319    Order Status: Completed Specimen: Blood from Peripheral Updated: 09/04/21 0648     Significant Indicator NEG     Source BLD     Site PERIPHERAL     Culture Result No Growth  Note: Blood cultures are incubated for 5 days and  are monitored continuously.Positive blood cultures  are called to the RN and reported as soon as  they are identified.      Narrative:      Per Hospital Policy: Only change Specimen Src: to \"Line\" if  specified by physician order.  Right Wrist    URINE CULTURE-EXISTING-LESS THAN 48 HOURS [[id number]] Collected: 08/31/21 0634    Order Status: Completed Specimen: Urine, Clean Catch Updated: 09/03/21 1018     Significant Indicator NEG     Source UR     Site URINE, CLEAN CATCH     Culture Result Usual urogenital sammie ,000 cfu/mL    Narrative:      Please obtain from prior Urine sample  Indication for culture:->New onset fever with no other  identified cause  Please obtain from prior Urine sample    BLOOD CULTURE [[id number]]  (Abnormal) Collected: 08/31/21 0230    Order Status: Completed Specimen: Blood from Peripheral Updated: 09/02/21 1017     Significant Indicator POS     Source BLD     Site PERIPHERAL     Culture Result Growth detected by Bactec instrument. 08/31/2021  15:42      Escherichia coli  See previous culture for sensitivity report.      Narrative:      CALL  Lane  141 tel. [phone],  CALLED  141 tel. [phone] 08/31/2021, 15:43, RB PERF. RESULTS CALLED  TO:[phone] RN  Per Hospital Policy: Only change Specimen Src: to \"Line\" if  specified by physician order.  No site indicated    BLOOD CULTURE [[id number]]  (Abnormal)  (Susceptibility) Collected: 08/31/21 0220    Order Status: Completed Specimen: Blood " "from Peripheral Updated: 09/02/21 1015     Significant Indicator POS     Source BLD     Site PERIPHERAL     Culture Result Growth detected by Bactec instrument. 08/31/2021  15:40      Escherichia coli    Narrative:      CALL  Lane  141 tel. [phone],  CALLED  141 tel. [phone] 08/31/2021, 15:42, RB PERF. RESULTS CALLED  TO:[phone] RN  Per Hospital Policy: Only change Specimen Src: to \"Line\" if  specified by physician order.  No site indicated    Susceptibility     Escherichia coli (1)     Antibiotic Interpretation Microscan Method Status    Ampicillin Sensitive <=8 mcg/mL KALYN Final    Ceftriaxone Sensitive <=1 mcg/mL KALYN Final    Cefazolin Sensitive <=2 mcg/mL KALYN Final    Ciprofloxacin Sensitive <=0.25 mcg/mL KALYN Final    Cefepime Sensitive <=2 mcg/mL KALYN Final    Cefuroxime Sensitive <=4 mcg/mL KALYN Final    Ampicillin/sulbactam Sensitive <=4/2 mcg/mL KALYN Final    Tobramycin Sensitive <=2 mcg/mL KALYN Final    Ertapenem Sensitive <=0.5 mcg/mL KALYN Final    Gentamicin Sensitive <=2 mcg/mL KALYN Final    Moxifloxacin Sensitive <=2 mcg/mL KALYN Final    Pip/Tazobactam Sensitive <=8 mcg/mL KALYN Final    Trimeth/Sulfa Sensitive <=0.5/9.5 mcg/mL KALYN Final    Tigecycline Sensitive <=2 mcg/mL KALYN Final                   MRSA By PCR (Amp) [[id number]] Collected: 08/31/21 1709    Order Status: Completed Specimen: Respirate Updated: 08/31/21 2104     Significant Indicator NEG     Source RESP     Site Nares     MRSA PCR Negative for MRSA by PCR.          Assessment:  Active Hospital Problems    Diagnosis    • *Bacteremia [R78.81]    • MICHAELA (obstructive sleep apnea) [G47.33]    • Sepsis ([facility]) [A41.9]    • Thrombocytopenia ([facility]) [D69.6]    • COPD (chronic obstructive pulmonary disease) ([facility]) [J44.9]    • Acute on chronic respiratory failure with hypoxia ([facility]) [J96.21]    • Tobacco abuse [Z72.0]    • Obesity [E66.9]    • Hypothyroid [E03.9]    • Uncontrolled type 1 diabetes mellitus ([facility]) [E10.65]      Interval 24 hours:      AF, O2 4 L " NC, stable   Labs reviewed  Micro reviewed    She is complaining of ongoing low back and hip pain that she states is chronic.  She also is complaining of some head sweating at night.  Antibiotics transitioned to ciprofloxacin as below.    Assessment:  70-year-old female patient with chronic hypoxemic respiratory failure on 4 L oxygen at home, type 1 diabetes, obesity, MICHAELA, presented with cough and shortness of breath, was found to have sepsis with E. coli bacteremia.  The radiologist did interpret a possible right middle lobe pneumonia.  However, despite being on appropriate antibiotics, repeat blood cultures x2 were positive for pansensitive E. coli which is unusual in the absence of source control issues.  Patient does have a right upper quadrant tenderness on exam.     Pertinent Diagnoses:  E. coli bacteremia, persistent  Type 1 diabetes  Chronic hypoxemic respiratory failure  MICHAELA  Obesity     Plan:  -Stop ceftriaxone and transition to ciprofloxacin 500 mg twice daily -monitor her overnight and if blood cultures remain negative tomorrow then obtain EKG to ensure no QTC prolongation and then can likely discharge on ciprofloxacin - will plan on a 2-week antibiotic course from 1st negative blood culture given the persistent positive blood cultures.    -Discussed fluoroquinolones:  Medication should not be taken with multi vitamins.  Monitor blood sugars closely if diabetic. Discuss medication with health care professional if new joint or tendon pain. Seek medical attention if experience multiple watery bowel movements.   EKG on 9/8  QTc:  -Blood cultures positive from 8/31, 9/1, 9/3   -Repeat blood cultures from 9/6 are so far no growth   -CT chest abdomen pelvis with no source control issues.  Noted multiple small wedge-shaped lesions in the liver concerning for hepatic infarcts from possible septic embolization  -TTE with no obvious valvular vegetations  -MIMI with no vegetations or significant valvular  disease  -Given the persistent positive blood culture and the possible hepatic infarcts will recommend surveillance blood cultures 1 week after stopping antibiotics        iscussed with internal medicine, Dr. Gracia.      ID will follow.  Please call with questions.

## 2021-09-07 NOTE — CARE PLAN
The patient is Stable - Low risk of patient condition declining or worsening    Shift Goals  Clinical Goals: rest  Patient Goals: rest    Progress made toward(s) clinical / shift goals:  Medicated for pain; see MAR. Patient sleeping at present time.     Patient is not progressing towards the following goals: NA.

## 2021-09-07 NOTE — PROGRESS NOTES
Hospital Medicine Daily Progress Note    Date of Service  9/7/2021    Chief Complaint  Michelle Espinoza is a 70 y.o. female admitted 8/31/2021 with fever    Hospital Course  70-year-old female with history of smoking, insulin and COPD on 4 L nasal cannula with obstructive sleep apnea presented 8/31 with shortness of breath and fever also she had [place] with generalized weakness, on admission labs showed leukocytosis with elevated procalcitonin and chest x-ray showed right Evex infiltration possible infection versus malignancy, antibiotics was started and blood culture came back positive with E. Coli, with some improving on her symptoms however repeat blood culture was positive on 9/1 and repeat blood culture again on 9/3 was positive, CT abdomen and chest possible septic emboli in the liver and possible pyelonephritis also showed pneumonia, MIMI was done 9/5 and did not show any vegetation.  ID recommended 2 weeks of oral ciprofloxacin      Interval Problem Update  9/7/2021  Remained hypertensive.  Blood pressure medicine adjusted  Remain afebrile  Labs remain unremarkable  Preliminary blood culture remain negative  ID following closely  Plan to discharge home on 2 weeks of ciprofloxacin  We will check EKG for QTC prolongation tomorrow  I have personally seen and examined the patient at bedside. I discussed the plan of care with patient.    Consultants/Specialty  infectious disease    Code Status  Full Code    Disposition  Patient is not medically cleared.   Anticipate discharge to to home with close outpatient follow-up.  I have placed the appropriate orders for post-discharge needs.    Review of Systems  Review of Systems   Constitutional: Negative for fever.   Respiratory: Negative for cough and shortness of breath.    Gastrointestinal: Negative for vomiting.   Neurological: Negative for sensory change and speech change.        Physical Exam  Temp:  [35.9 °C (96.6 °F)-36.7 °C (98.1 °F)] 36.7 °C (98.1 °F)  Pulse:   [61-77] 68  Resp:  [17-18] 18  BP: (145-180)/(79-90) 154/90  SpO2:  [90 %-97 %] 94 %    Physical Exam  Constitutional:       Appearance: Normal appearance.   HENT:      Head: Normocephalic and atraumatic.      Right Ear: Tympanic membrane normal.      Left Ear: Tympanic membrane normal.      Nose: Nose normal.      Mouth/Throat:      Mouth: Mucous membranes are dry.   Eyes:      Pupils: Pupils are equal, round, and reactive to light.   Cardiovascular:      Rate and Rhythm: Normal rate and regular rhythm.      Pulses: Normal pulses.      Heart sounds: No murmur heard.     Pulmonary:      Effort: Pulmonary effort is normal. No respiratory distress.      Breath sounds: Normal breath sounds.   Abdominal:      General: Abdomen is flat. There is no distension.      Palpations: Abdomen is soft.   Skin:     General: Skin is warm.   Neurological:      General: No focal deficit present.      Mental Status: She is alert and oriented to person, place, and time. Mental status is at baseline.         Fluids    Intake/Output Summary (Last 24 hours) at 9/7/2021 1259  Last data filed at 9/7/2021 0730  Gross per 24 hour   Intake 480 ml   Output --   Net 480 ml       Laboratory  Recent Labs     09/05/21  0258 09/06/21  0435   WBC 10.4 8.0   RBC 4.25 3.79*   HEMOGLOBIN 13.7 12.4   HEMATOCRIT 41.8 37.4   MCV 98.4* 98.7*   MCH 32.2 32.7   MCHC 32.8* 33.2*   RDW 47.0 46.8   PLATELETCT 217 216   MPV 11.2 10.8     Recent Labs     09/06/21  0435   SODIUM 141   POTASSIUM 3.7   CHLORIDE 105   CO2 27   GLUCOSE 148*   BUN 13   CREATININE 0.94   CALCIUM 8.6                   Imaging  EC-MIMI W/O CONT   Final Result      EC-ECHOCARDIOGRAM COMPLETE W/O CONT   Final Result      CT-CHEST,ABDOMEN,PELVIS WITH   Final Result         1. Small peripheral ill-defined wedge-shaped low attenuating areas throughout the liver could relate to small infarcts secondary to septic emboli.         2. Ill-defined decreased enhancement in the left kidney is nonspecific  and could be seen with pyelonephritis. Renal infarct is in the differential but this appearance is not typical.      3. Hazy peripheral groundglass opacities throughout both lungs with interlobular septal thickening are nonspecific but could relate to infection as well.      DX-CHEST-PORTABLE (1 VIEW)   Final Result      Interstitial opacity compatible with underlying scarring      Some additional consolidative opacity right midlung zone could indicate bacterial or Covid pneumonia      Right apex subpleural opacity most likely from pleural-parenchymal scarring. Malignant nodule or infection cannot be excluded           Assessment/Plan  * Bacteremia  Assessment & Plan  Ecoli, pan sensitive but persistent  Blood culture on 8/31+ for E. Coli  Blood culture on 9/1+ for E. Coli  Blood culture on 9/3 also positive for E. coli  CT abdomen showed possible septic emboli on the liver  TTE and MIMI did not show any vegetation  ID following and awaiting culture results   Repeated Preliminary blood culture negative  Started on ciprofloxacin        MICHAELA (obstructive sleep apnea)  Assessment & Plan  Cpap ordered, pt having trouble tolerating hospital machine    Thrombocytopenia (HCC)- (present on admission)  Assessment & Plan  Mild  Improving  Monitor intermittently    Sepsis (HCC)- (present on admission)  Assessment & Plan  This is Sepsis Present on admission  SIRS criteria identified on my evaluation include: Tachycardia, with heart rate greater than 90 BPM, Tachypnea, with respirations greater than 20 per minute and Leukocytosis, with WBC greater than 12,000  Source is Respiratory  Sepsis protocol initiated  IV antibiotics as appropriate for source of sepsis  While organ dysfunction may be noted elsewhere in this problem list or in the chart, degree of organ dysfunction does not meet CMS criteria for severe sepsis  Sputum culture/blood culture x2, mycoplasma PCR, urine/strep urinary antigen ordered and pending  IV antibiotics  initiated in ED and will continue onward.  Procalcitonin ordered and pending.  Significant for middle lobar pneumonia, influenza A/B, RSV and Covid negative.  MRSA probe ordered and pending.    Empiric vanc stopped  MIMI is negative  On ciprofloxacin    COPD (chronic obstructive pulmonary disease) (HCC)- (present on admission)  Assessment & Plan  Continue supplemental oxygen to maintain SPO2 greater than 88%  Now back at baseline  Completed steroid taper  Duo nebs as needed  Encouraged the patient to quit smoking    Acute on chronic respiratory failure with hypoxia (HCC)- (present on admission)  Assessment & Plan  Continues to use tobacco however in agreement with tobacco cessation protocol.  Maintain SPO2 greater than 88%  Continue steroid taper and continue inhalers  Use 4 L at rest at home, she is on by her baseline  Chest x-ray reveals middle right lobar pneumonia  Influenza A/B, RSV and Covid negative  Improving and continue inhalers    Tobacco abuse- (present on admission)  Assessment & Plan  6 minutes tobacco cessation counseling commenced at bedside.  Patient explained and educated the risks of smoking.  Education provided, in agreement with alternatives such as nicotine patch, Nicorette gum.  Strong recommendation for outpatient follow-up with smoking cessation program at discharge as patient still has urges to smoke however in agreement with cessation.    Obesity- (present on admission)  Assessment & Plan  Strong recommendation for outpatient follow-up with PCP for lifestyle modification including diet and exercise regimen of at least 30 minutes brisk cardiovascular exercise 3-5 times per week.    Hypothyroid- (present on admission)  Assessment & Plan  Continue Synthroid 112 mcg p.o. daily  Avoid calcium supplementation within 1 hour of administration    Uncontrolled type 1 diabetes mellitus (HCC)- (present on admission)  Assessment & Plan  Hemoglobin A1c of 6.1 as of July 2021  Insulin regimen adjusted         VTE prophylaxis: SCDs/TEDs and enoxaparin ppx    I have performed a physical exam and reviewed and updated ROS and Plan today (9/7/2021). In review of yesterday's note (9/6/2021), there are no changes except as documented above.  Patient getting 4-year-old

## 2021-09-07 NOTE — PROGRESS NOTES
Assumed care of patient at 0645. Bedside report received. Assessment complete.  Patient expressing anxiety/frustration due to glucose control   AA&Ox4. Denies CP/SOB.  Reporting 7/10 pain. Medicated per MAR.   Educated patient regarding pharmacologic and non pharmacologic modalities for pain management.  Skin per flowsheets  Tolerating diabetic diet. Denies N/V.  + void. + BM. Last BM 9/6  Pt ambulates self, no assistance required   All needs met at this time. Call light within reach. Pt calls appropriately. Bed low and locked, non skid socks in place. Hourly rounding in place.

## 2021-09-08 ENCOUNTER — TELEPHONE (OUTPATIENT)
Dept: OTHER | Facility: MEDICAL CENTER | Age: 71
End: 2021-09-08

## 2021-09-08 ENCOUNTER — PHARMACY VISIT (OUTPATIENT)
Dept: PHARMACY | Facility: MEDICAL CENTER | Age: 71
End: 2021-09-08
Payer: MEDICARE

## 2021-09-08 ENCOUNTER — PATIENT OUTREACH (OUTPATIENT)
Dept: HEALTH INFORMATION MANAGEMENT | Facility: OTHER | Age: 71
End: 2021-09-08

## 2021-09-08 VITALS
OXYGEN SATURATION: 93 % | HEART RATE: 68 BPM | RESPIRATION RATE: 18 BRPM | DIASTOLIC BLOOD PRESSURE: 65 MMHG | WEIGHT: 231.26 LBS | TEMPERATURE: 97.1 F | SYSTOLIC BLOOD PRESSURE: 110 MMHG | HEIGHT: 68 IN | BODY MASS INDEX: 35.05 KG/M2

## 2021-09-08 VITALS — OXYGEN SATURATION: 92 % | HEART RATE: 78 BPM | RESPIRATION RATE: 18 BRPM

## 2021-09-08 VITALS — HEART RATE: 75 BPM | OXYGEN SATURATION: 92 % | RESPIRATION RATE: 19 BRPM

## 2021-09-08 VITALS — RESPIRATION RATE: 18 BRPM | HEART RATE: 78 BPM | OXYGEN SATURATION: 92 %

## 2021-09-08 DIAGNOSIS — R78.81 BACTEREMIA: ICD-10-CM

## 2021-09-08 LAB
BACTERIA BLD CULT: NORMAL
EKG IMPRESSION: NORMAL
GLUCOSE BLD-MCNC: 288 MG/DL (ref 65–99)
GLUCOSE BLD-MCNC: 296 MG/DL (ref 65–99)
GLUCOSE BLD-MCNC: 326 MG/DL (ref 65–99)
SIGNIFICANT IND 70042: NORMAL
SITE SITE: NORMAL
SOURCE SOURCE: NORMAL

## 2021-09-08 PROCEDURE — 700102 HCHG RX REV CODE 250 W/ 637 OVERRIDE(OP): Performed by: HOSPITALIST

## 2021-09-08 PROCEDURE — 99239 HOSP IP/OBS DSCHRG MGMT >30: CPT | Performed by: STUDENT IN AN ORGANIZED HEALTH CARE EDUCATION/TRAINING PROGRAM

## 2021-09-08 PROCEDURE — A9270 NON-COVERED ITEM OR SERVICE: HCPCS | Performed by: INTERNAL MEDICINE

## 2021-09-08 PROCEDURE — A9270 NON-COVERED ITEM OR SERVICE: HCPCS | Performed by: STUDENT IN AN ORGANIZED HEALTH CARE EDUCATION/TRAINING PROGRAM

## 2021-09-08 PROCEDURE — RXMED WILLOW AMBULATORY MEDICATION CHARGE: Performed by: STUDENT IN AN ORGANIZED HEALTH CARE EDUCATION/TRAINING PROGRAM

## 2021-09-08 PROCEDURE — A9270 NON-COVERED ITEM OR SERVICE: HCPCS | Performed by: HOSPITALIST

## 2021-09-08 PROCEDURE — 700102 HCHG RX REV CODE 250 W/ 637 OVERRIDE(OP): Performed by: STUDENT IN AN ORGANIZED HEALTH CARE EDUCATION/TRAINING PROGRAM

## 2021-09-08 PROCEDURE — 700111 HCHG RX REV CODE 636 W/ 250 OVERRIDE (IP): Performed by: STUDENT IN AN ORGANIZED HEALTH CARE EDUCATION/TRAINING PROGRAM

## 2021-09-08 PROCEDURE — 94640 AIRWAY INHALATION TREATMENT: CPT

## 2021-09-08 PROCEDURE — 93005 ELECTROCARDIOGRAM TRACING: CPT | Performed by: STUDENT IN AN ORGANIZED HEALTH CARE EDUCATION/TRAINING PROGRAM

## 2021-09-08 PROCEDURE — 82962 GLUCOSE BLOOD TEST: CPT | Mod: 91

## 2021-09-08 PROCEDURE — 99453 REM MNTR PHYSIOL PARAM SETUP: CPT

## 2021-09-08 PROCEDURE — 99232 SBSQ HOSP IP/OBS MODERATE 35: CPT | Performed by: INTERNAL MEDICINE

## 2021-09-08 PROCEDURE — 93010 ELECTROCARDIOGRAM REPORT: CPT | Performed by: INTERNAL MEDICINE

## 2021-09-08 PROCEDURE — 700102 HCHG RX REV CODE 250 W/ 637 OVERRIDE(OP): Performed by: INTERNAL MEDICINE

## 2021-09-08 PROCEDURE — 99454 REM MNTR PHYSIOL PARAM 16-30: CPT

## 2021-09-08 RX ORDER — DEXTROSE MONOHYDRATE 25 G/50ML
50 INJECTION, SOLUTION INTRAVENOUS
Status: DISCONTINUED | OUTPATIENT
Start: 2021-09-08 | End: 2021-09-08

## 2021-09-08 RX ORDER — CIPROFLOXACIN 500 MG/1
500 TABLET, FILM COATED ORAL EVERY 12 HOURS
Qty: 28 TABLET | Refills: 0 | Status: SHIPPED | OUTPATIENT
Start: 2021-09-08 | End: 2021-09-22

## 2021-09-08 RX ORDER — AMLODIPINE BESYLATE 10 MG/1
10 TABLET ORAL DAILY
Qty: 30 TABLET | Refills: 0 | Status: SHIPPED | OUTPATIENT
Start: 2021-09-09 | End: 2021-10-09

## 2021-09-08 RX ADMIN — CAPTOPRIL 12.5 MG: 12.5 TABLET ORAL at 05:22

## 2021-09-08 RX ADMIN — CIPROFLOXACIN 500 MG: 500 TABLET, FILM COATED ORAL at 05:24

## 2021-09-08 RX ADMIN — RALOXIFENE 60 MG: 60 TABLET ORAL at 05:22

## 2021-09-08 RX ADMIN — OMEPRAZOLE 20 MG: 20 CAPSULE, DELAYED RELEASE ORAL at 05:24

## 2021-09-08 RX ADMIN — GUAIFENESIN 1200 MG: 600 TABLET, EXTENDED RELEASE ORAL at 05:21

## 2021-09-08 RX ADMIN — BUDESONIDE AND FORMOTEROL FUMARATE DIHYDRATE 2 PUFF: 80; 4.5 AEROSOL RESPIRATORY (INHALATION) at 07:21

## 2021-09-08 RX ADMIN — LEVOTHYROXINE SODIUM 112 MCG: 0.11 TABLET ORAL at 05:22

## 2021-09-08 RX ADMIN — TRAMADOL HYDROCHLORIDE 50 MG: 50 TABLET, FILM COATED ORAL at 06:46

## 2021-09-08 RX ADMIN — ACETAMINOPHEN 650 MG: 325 TABLET, FILM COATED ORAL at 00:09

## 2021-09-08 RX ADMIN — FLUOXETINE 20 MG: 20 CAPSULE ORAL at 05:22

## 2021-09-08 RX ADMIN — DOCUSATE SODIUM 50 MG AND SENNOSIDES 8.6 MG 2 TABLET: 8.6; 5 TABLET, FILM COATED ORAL at 05:23

## 2021-09-08 RX ADMIN — INSULIN LISPRO 5 UNITS: 100 INJECTION, SOLUTION INTRAVENOUS; SUBCUTANEOUS at 12:04

## 2021-09-08 RX ADMIN — ENOXAPARIN SODIUM 40 MG: 40 INJECTION SUBCUTANEOUS at 05:21

## 2021-09-08 RX ADMIN — TIOTROPIUM BROMIDE INHALATION SPRAY 5 MCG: 3.12 SPRAY, METERED RESPIRATORY (INHALATION) at 07:22

## 2021-09-08 RX ADMIN — CETIRIZINE HYDROCHLORIDE 10 MG: 10 TABLET, FILM COATED ORAL at 05:23

## 2021-09-08 RX ADMIN — ACETAMINOPHEN 650 MG: 325 TABLET, FILM COATED ORAL at 06:47

## 2021-09-08 RX ADMIN — INSULIN LISPRO 7 UNITS: 100 INJECTION, SOLUTION INTRAVENOUS; SUBCUTANEOUS at 07:47

## 2021-09-08 RX ADMIN — ASPIRIN 81 MG: 81 TABLET, COATED ORAL at 05:22

## 2021-09-08 RX ADMIN — AMLODIPINE BESYLATE 10 MG: 10 TABLET ORAL at 05:23

## 2021-09-08 RX ADMIN — TRAMADOL HYDROCHLORIDE 50 MG: 50 TABLET, FILM COATED ORAL at 00:09

## 2021-09-08 RX ADMIN — MONTELUKAST 10 MG: 10 TABLET, FILM COATED ORAL at 05:22

## 2021-09-08 RX ADMIN — NICOTINE 14 MG: 14 PATCH TRANSDERMAL at 05:23

## 2021-09-08 ASSESSMENT — ENCOUNTER SYMPTOMS
CHILLS: 0
FEVER: 0
VOMITING: 0
NAUSEA: 0
COUGH: 1
SHORTNESS OF BREATH: 1
CONSTIPATION: 0
ABDOMINAL PAIN: 0
DIARRHEA: 0

## 2021-09-08 ASSESSMENT — PAIN DESCRIPTION - PAIN TYPE
TYPE: CHRONIC PAIN

## 2021-09-08 NOTE — DISCHARGE INSTRUCTIONS
Discharge Instructions    Discharged to home by car with relative. Discharged via wheelchair, hospital escort: Yes.  Special equipment needed: Not Applicable    Be sure to schedule a follow-up appointment with your primary care doctor or any specialists as instructed.     Discharge Plan:   Diet Plan: Discussed  Activity Level: Discussed  Confirmed Follow up Appointment: Patient to Call and Schedule Appointment  Confirmed Symptoms Management: Discussed  Medication Reconciliation Updated: Yes    I understand that a diet low in cholesterol, fat, and sodium is recommended for good health. Unless I have been given specific instructions below for another diet, I accept this instruction as my diet prescription.   Other diet: diabetic diet    Special Instructions: None    · Is patient discharged on Warfarin / Coumadin?   No     Depression / Suicide Risk    As you are discharged from this [facility] facility, it is important to learn how to keep safe from harming yourself.    Recognize the warning signs:  · Abrupt changes in personality, positive or negative- including increase in energy   · Giving away possessions  · Change in eating patterns- significant weight changes-  positive or negative  · Change in sleeping patterns- unable to sleep or sleeping all the time   · Unwillingness or inability to communicate  · Depression  · Unusual sadness, discouragement and loneliness  · Talk of wanting to die  · Neglect of personal appearance   · Rebelliousness- reckless behavior  · Withdrawal from people/activities they love  · Confusion- inability to concentrate     If you or a loved one observes any of these behaviors or has concerns about self-harm, here's what you can do:  · Talk about it- your feelings and reasons for harming yourself  · Remove any means that you might use to hurt yourself (examples: pills, rope, extension cords, firearm)  · Get professional help from the community (Mental Health, Substance Abuse,  psychological counseling)  · Do not be alone:Call your Safe Contact- someone whom you trust who will be there for you.  · Call your local CRISIS HOTLINE 004-9109 or 982-781-8685  · Call your local Children's Mobile Crisis Response Team [place] (309) 751-8714 or www.[employer]  · Call the toll free National Suicide Prevention Hotlines   · National Suicide Prevention Lifeline 866-871-AVMM (9839)  · [employer] Line Network 800-SUICIDE (904-8925)      Bacteremia, Adult  Bacteremia is the presence of bacteria in the blood. When bacteria enter the bloodstream, they can cause a life-threatening reaction called sepsis, which is a medical emergency. Bacteremia can spread to other parts of the body, including the heart, joints, and brain.  What are the causes?  This condition is caused by bacteria that get into the blood.  · Bacteria can enter the blood:  ? From a skin infection or injury, such as a burn or a cut.  ? From a lung infection (pneumonia).  ? From an infection in your stomach or intestines (gastrointestinal infection).  ? From an infection in your bladder or urinary system (urinary tract infection).  ? During a dental or medical procedure.  ? From bleeding gums.  ? When a bacterial infection in another part of your body spreads to your blood.  ? Through an unclean (contaminated) needle.  What increases the risk?  This condition is more likely to develop in children, the elderly, and people who:  · Have a long-term (chronic) disease or condition like diabetes or chronic kidney failure.  · Have an artificial joint or heart valve.  · Have heart valve disease.  · Have a tube inserted to treat a medical condition, such as a urinary catheter or IV.  · Have a weak disease-fighting system (immune system).  · Inject illegal drugs.  · Have been hospitalized for more than 10 days in a row.  What are the signs or symptoms?  Symptoms of this condition include:  · Fever.  · Chills.  · Fast heartbeat.  · Shortness  of breath.  · Dizziness.  · Weakness.  · Confusion.  · Nausea or vomiting.  · Diarrhea.  · Low blood pressure.  · Decreased urine output.  Bacteremia that has spread to other parts of the body may cause symptoms in those areas. In some cases, there are no symptoms.  How is this diagnosed?  This condition may be diagnosed with a physical exam and tests, such as:  · A complete blood count (CBC). This test checks for signs of infection.  · Blood cultures. These check for bacteria in your blood.  · Tests of any tubes that you have had inserted. These tests check for a source of infection.  · Urine tests, including urine cultures. These check for bacteria in the urine that could be a source of infection.  · Imaging tests, such as an X-ray, CT scan, MRI, or heart ultrasound. These check for a source of infection in other parts of your body, such as your lungs, heart valves, or joints.  How is this treated?  This condition is usually treated in the hospital. Treatment may involve:  · Antibiotic medicines. These may be given by mouth (orally) or directly into your blood through an IV (infusion through your vein).  ? Depending on the source of infection, you may need antibiotics for several weeks.  ? At first, you may be given an antibiotic to kill most types of blood bacteria (broad-spectrum antibiotic). If your test results show that a certain kind of bacteria is causing the problem, you may be given a different antibiotic to kill that specific bacteria.  · IV fluids.  · Removing any catheter or device that could be a source of infection.  · Blood pressure and breathing support, if needed.  · Surgery to control the source or the spread of infection, such as surgery to remove an infected device, abscess, or tissue.  · Having follow-up visits for medicines, blood tests, and further evaluation.  Follow these instructions at home:  Medicines  · Take over-the-counter and prescription medicines only as told by your health care  provider.  · If you were prescribed an antibiotic medicine, take it as told by your health care provider. Do not stop taking the antibiotic even if you start to feel better.  General instructions    · Rest as needed. Ask your health care provider when you may return to normal activities.  · Drink enough fluid to keep your urine pale yellow.  · Do not use any products that contain nicotine or tobacco, such as cigarettes and e-cigarettes. If you need help quitting, ask your health care provider.  · Keep all follow-up visits as told by your health care provider. This is important.  How is this prevented?    · Wash your hands regularly with soap and water. If soap and water are not available, use hand .  · You should wash your hands:  ? After using the toilet or changing a diaper.  ? Before preparing, cooking, or serving food.  ? While caring for a sick person or while visiting someone in a hospital.  ? Before and after changing bandages (dressings) over wounds.  · Clean any scrapes or cuts with soap and water and cover them with clean dressings.  · Get vaccinations as recommended by your health care provider.  · Practice good oral hygiene. Brush your teeth two times a day, and floss regularly.  · Take good care of your skin. This includes bathing and moisturizing on a regular basis.  Get help right away if you have:  · Pain.  · A fever or chills.  · Trouble breathing.  · A fast heart rate.  · Skin that is blotchy, pale, or clammy.  · Confusion.  · Weakness.  · Lack of energy (lethargy) or unusual sleepiness.  · Diarrhea.  · New symptoms that develop after treatment has started.  These symptoms may represent a serious problem that is an emergency. Do not wait to see if the symptoms will go away. Get medical help right away. Call your local emergency services (911 in the [place] Do not drive yourself to the hospital.  Summary  · Bacteremia is the presence of bacteria in the blood. When bacteria enter the  bloodstream, they can cause a life-threatening reaction called sepsis.  · Some symptoms of bacteremia include fever, chills, shortness of breath, confusion, nausea or vomiting, and diarrhea.  · Tests may be done to find the source of infection that led to bacteremia. These tests may include blood tests, urine tests, and imaging tests.  · Bacteremia is usually treated with antibiotic medicines in the hospital.  · Get help right away if you have any new symptoms that develop after treatment has started.  This information is not intended to replace advice given to you by your health care provider. Make sure you discuss any questions you have with your health care provider.  Document Released: 10/01/2007 Document Revised: 04/29/2019 Document Reviewed: 04/29/2019  [employer] Patient Education © 2020 [employer]    Discharge Instructions per Ander Gracia M.D.    Follow-up with primary care provider within 7 days.    Repeat blood culture as outpatient     DIAGNOSIS:  Principal Problem:    Bacteremia POA: Unknown      Overview: ecoli      Following culture  Active Problems:    Uncontrolled type 1 diabetes mellitus (HCC) POA: Yes      Overview: ICD-10 transition    Hypothyroid (Chronic) POA: Yes    Obesity POA: Yes    Tobacco abuse (Chronic) POA: Yes    Acute on chronic respiratory failure with hypoxia (HCC) POA: Yes    COPD (chronic obstructive pulmonary disease) (HCC) POA: Yes    Sepsis (HCC) POA: Yes    Thrombocytopenia (HCC) POA: Yes    MICHAELA (obstructive sleep apnea) POA: Unknown  Resolved Problems:    Hypertension POA: Yes      Return to ER if you develop any of the following symptoms fever, chills, weakness, not feeling well, rash, bleeding, blurred vision, palpitations, cough, pain in any part of your body, shortness of breath, nausea, vomiting, diarrhea, difficulty with urination, dizziness, headache, seizures, loss of consciousness or if you develop any new symptoms.

## 2021-09-08 NOTE — TELEPHONE ENCOUNTER
Michelle called to let us know she was going to disconnect to shower and will call us back when she is ready to connect again.

## 2021-09-08 NOTE — PROGRESS NOTES
Spoke to hospitalist to clarify pt insulin orders. Hospitalist notified that pt does not have a sliding scale and has blood sugar of 288. No new orders received.

## 2021-09-08 NOTE — PROGRESS NOTES
Patient discharged home in stable condition, PIV removed, discharge instructions reviewed and signed, all questions answered.

## 2021-09-08 NOTE — PROGRESS NOTES
Infectious Disease Progress Note    Author: Brittanie Jones M.D. Date & Time of service: 2021  9:05 AM       Chief Complaint:  Follow-up for E. coli bacteremia     Interval History:   patient is afebrile, white count 7.4, repeat blood cultures x2 again positive despite appropriate antibiotics   patient remains afebrile, white count is 10.4, transferred to telemetry floor.  Patient is resting comfortably this morning.   AF, O2 3 L NC, complaining of some back pain that she states is chronic and unchanged.      AF, O2 4 L NC, stable, ongoing low back and hip pain that she states is chronic.  She also is complaining of some head sweating at night.  Antibiotics transitioned to ciprofloxacin as below.    Review of Systems:  Review of Systems   Constitutional: Negative for chills, fever and malaise/fatigue.   Respiratory: Positive for cough and shortness of breath.    Gastrointestinal: Negative for abdominal pain, constipation, diarrhea, nausea and vomiting.       Hemodynamics:  Temp (24hrs), Av.6 °C (97.9 °F), Min:36.2 °C (97.1 °F), Max:37.1 °C (98.8 °F)  Temperature: 36.2 °C (97.1 °F)  Pulse  Av.7  Min: 59  Max: 117   Blood Pressure : 110/65       Physical Exam:  Physical Exam  Constitutional:       Appearance: Normal appearance.   Cardiovascular:      Rate and Rhythm: Normal rate and regular rhythm.   Pulmonary:      Effort: Pulmonary effort is normal.      Breath sounds: Wheezing present.   Abdominal:      General: Abdomen is flat. Bowel sounds are normal.      Palpations: Abdomen is soft.   Musculoskeletal:      Right lower leg: No edema.      Left lower leg: No edema.   Skin:     General: Skin is warm and dry.   Neurological:      General: No focal deficit present.      Mental Status: She is alert and oriented to person, place, and time.   Psychiatric:         Mood and Affect: Mood normal.         Behavior: Behavior normal.         Meds:    Current Facility-Administered Medications:   •   insulin regular **AND** POC blood glucose manual result **AND** NOTIFY MD and PharmD **AND** [DISCONTINUED] glucose **AND** [DISCONTINUED] dextrose 50%  •  insulin glargine **AND** insulin lispro **AND** [DISCONTINUED] insulin lispro **AND** POC blood glucose manual result **AND** NOTIFY MD and PharmD **AND** glucose **AND** dextrose 50%  •  amLODIPine  •  ciprofloxacin  •  albuterol  •  albuterol  •  budesonide-formoterol  •  traMADol  •  simethicone  •  enalaprilat  •  ipratropium-albuterol  •  omeprazole  •  cetirizine  •  captopril  •  mag hydrox-al hydrox-simeth  •  aspirin EC  •  FLUoxetine  •  levothyroxine  •  lovastatin  •  montelukast  •  raloxifene  •  senna-docusate **AND** polyethylene glycol/lytes **AND** magnesium hydroxide **AND** bisacodyl  •  enoxaparin  •  guaiFENesin dextromethorphan  •  ondansetron  •  ondansetron  •  nicotine **AND** Nicotine Replacement Patient Education Materials **AND** nicotine polacrilex  •  tiotropium  •  Respiratory Therapy Consult  •  guaiFENesin ER  •  acetaminophen    Labs:  Recent Labs     09/06/21  0435   WBC 8.0   RBC 3.79*   HEMOGLOBIN 12.4   HEMATOCRIT 37.4   MCV 98.7*   MCH 32.7   RDW 46.8   PLATELETCT 216   MPV 10.8   NEUTSPOLYS 73.40*   LYMPHOCYTES 18.30*   MONOCYTES 5.40   EOSINOPHILS 1.60   BASOPHILS 0.30     Recent Labs     09/06/21  0435   SODIUM 141   POTASSIUM 3.7   CHLORIDE 105   CO2 27   GLUCOSE 148*   BUN 13     Recent Labs     09/06/21  0435   CREATININE 0.94       Imaging:  CT-CHEST,ABDOMEN,PELVIS WITH    Result Date: 9/2/2021 9/2/2021 5:17 PM HISTORY/REASON FOR EXAM:  Abdominal pain, fever; evaluate for intra-abdominal abscess or infection in RUQ or LUQ (regions tender to palpation). e coli bacteremia.. TECHNIQUE/EXAM DESCRIPTION: CT scan of the chest, abdomen and pelvis with contrast. Thin-section helical scanning was obtained with intravenous contrast from the lung apices through the pubic symphysis to include the chest, abdomen and pelvis. 80  mL of Omnipaque 350 nonionic contrast was administered intravenously without complication. Low dose optimization technique was utilized for this CT exam including automated exposure control and adjustment of the mA and/or kV according to patient size. COMPARISON: None. FINDINGS: CT Chest: Lungs: Hazy peripheral groundglass opacities throughout both lungs with interlobular septal thickening. Mediastinum/Marisabel: No significant adenopathy. Pleura: No pleural effusion. Cardiac: Heart normal in size without pericardial effusion. Vascular: No central pulmonary embolus. Soft tissues: Unremarkable. Bones: No acute or destructive process. Small hiatal hernia. CT Abdomen and Pelvis: Liver: Ill-defined peripheral wedge-shaped low attenuating areas throughout the liver. Spleen: Unremarkable. Pancreas: Unremarkable. Gallbladder: No calcified stones. Biliary: Nondilated. Adrenal glands: Normal. Kidneys: Ill-defined decreased enhancement in the left kidney. No hydronephrosis. Bowel: No obstruction or acute inflammation. Lymph nodes: No adenopathy. Vasculature: Unremarkable. Peritoneum: Unremarkable without ascites. Musculoskeletal: Moderate degenerative change of the lumbar spine. Pelvis: Limited visualization of the pelvis due to streak artifact from left hip prosthesis. No obvious fluid collection identified.     1. Small peripheral ill-defined wedge-shaped low attenuating areas throughout the liver could relate to small infarcts secondary to septic emboli. 2. Ill-defined decreased enhancement in the left kidney is nonspecific and could be seen with pyelonephritis. Renal infarct is in the differential but this appearance is not typical. 3. Hazy peripheral groundglass opacities throughout both lungs with interlobular septal thickening are nonspecific but could relate to infection as well.    DX-CHEST-PORTABLE (1 VIEW)    Result Date: 8/31/2021 8/31/2021 12:54 AM HISTORY/REASON FOR EXAM: Shortness of Breath. TECHNIQUE/EXAM  DESCRIPTION AND NUMBER OF VIEWS: Single AP view of the chest. COMPARISON: September 15, 2018 FINDINGS: Hardware: No change. Axillary and chest wall vascular clips bilaterally Lungs: Reticular pulmonary opacity again seen. Some new ill-defined right midlung zone opacity. Subpleural right apical opacity is seen. Pleura:  No pleural space process is seen. Heart and mediastinum: There is mild cardiac silhouette enlargement.     Interstitial opacity compatible with underlying scarring Some additional consolidative opacity right midlung zone could indicate bacterial or Covid pneumonia Right apex subpleural opacity most likely from pleural-parenchymal scarring. Malignant nodule or infection cannot be excluded    EC-ECHOCARDIOGRAM COMPLETE W/O CONT    Result Date: 9/3/2021  Transthoracic Echo Report Echocardiography Laboratory CONCLUSIONS No prior study is available for comparison. Left ventricular ejection fraction is visually estimated to be 60%. Normal diastolic function. Normal right ventricular size and systolic function. ANILA VALENTINO Exam Date:         2021                    15:03 Exam Location:     Inpatient Priority:          Routine Ordering Physician:        PAPITO KEANE Referring Physician:       [id number] Booker Sonographer:               Mone Medel [facility] Age:    70     Gender:    F MRN:    [id number] :    1950 BSA:    2.17   Ht (in):    68     Wt (lb):    231 Exam Type:     Complete Indications:     Endocarditis ICD Codes:       421 CPT Codes:       96260 BP:   128    /   74     HR:   61 Technical Quality:       Fair MEASUREMENTS  (Male / Female) Normal Values 2D ECHO LV Diastolic Diameter PLAX        4.4 cm                4.2 - 5.9 / 3.9 - 5.3 cm LV Systolic Diameter PLAX         3.3 cm                2.1 - 4.0 cm IVS Diastolic Thickness           1.1 cm                LVPW Diastolic Thickness          1.1 cm                LVOT Diameter                     2.2 cm                Estimated LV  Ejection Fraction    60 %                  LV Ejection Fraction MOD BP       75.5 %                >= 55  % LV Ejection Fraction MOD 4C       65.9 %                LV Ejection Fraction MOD 2C       79.6 %                IVC Diameter                      1.6 cm                DOPPLER AV Peak Velocity                  1.3 m/s               AV Peak Gradient                  7.1 mmHg              AV Mean Gradient                  4.1 mmHg              LVOT Peak Velocity                0.98 m/s              AV Area Cont Eq vti               2.8 cm2               Mitral E Point Velocity           0.9 m/s               Mitral E to A Ratio               1.2                   MV Pressure Half Time             63.8 ms               MV Area PHT                       3.4 cm2               MV Deceleration Time              220 ms                * Indicates values subject to auto-interpretation LV EF:  60    % FINDINGS Left Ventricle Normal left ventricular size and systolic function. Mild concentric left ventricular hypertrophy. Normal regional wall motion. Left ventricular ejection fraction is visually estimated to be 60%. Normal diastolic function. Right Ventricle Normal right ventricular size and systolic function. Right Atrium Normal right atrial size. Normal inferior vena cava size and inspiratory collapse. Left Atrium Normal left atrial size. Left atrial volume index is 24  mL/sq m. Mitral Valve The mitral valve is not well visualized. No stenosis or regurgitation seen. Aortic Valve The aortic valve is not well visualized. No stenosis or regurgitation seen. Tricuspid Valve The tricuspid valve is not well visualized. No stenosis or regurgitation seen. Right atrial pressure is estimated to be 3 mmHg. Unable to estimate pulmonary artery pressure due to an inadequate tricuspid regurgitant jet. Pulmonic Valve The pulmonic valve is not well visualized. Pericardium No pericardial effusion seen. Aorta The aortic root is normal.   Ascending aorta not well visualized. Frank Allan MD (Electronically Signed) Final Date:     2021                 16:23    EC-MIMI W/O CONT    Result Date: 2021  Transesophageal Echo Report Echocardiography Laboratory CONCLUSIONS Normal esophageal echocardiogram. No evidence of endocarditis. ANILA VALENTINO Exam Date:          2021                     09:54 Exam Location:      Inpatient Priority:            Routine Ordering Physician:        LUIS FELIPE TOBIN                             ([id number]) Referring Physician:       [id number] Yoo Sonographer:               Jovita MCELROY Age:    70     Gender:    F MRN:    [id number] :    1950 BSA:           Ht (in):           Wt (lb): Report Type:      Complete Indications:     Endocarditis and heart valve disorders in diseases                  classified elsewhere ICD Codes:       I39 CPT Codes:       37562 BP:   149    /   82     HR:   62 Technical Quality:       Good MEASUREMENTS  (Male / Female) Normal Values 2D ECHO Estimated LV Ejection Fraction    60 %                  * Indicates values subject to auto-interpretation LV EF:  60    % Medications I personally supervised moderate sedation from 10:10 to 10:30 AM. The patient was administered 4 mg of versed and 50 mcg of fentanyl intravenously in divided doses. Limitations none Complications none Proc. Components The probe was inserted and manipulated by Dr Espino. Probe [id number]  was used for this procedure. FINDINGS Left Ventricle The left ventricle was normal in size and function. Right Ventricle The right ventricle was normal in size and function. Right Atrium The right atrium is normal in size. Left Atrium The left atrium is normal in size. LA Appendage Normal left atrial appendage. IA Septum The interatrial septum is normal. IV Septum The interventricular septum is normal. Mitral Valve Structurally normal mitral valve without significant stenosis or regurgitation.  No valvular  "vegetations. Aortic Valve Structurally normal aortic valve without significant stenosis or regurgitation.  No valvular vegetations. Tricuspid Valve Structurally normal tricuspid valve without significant stenosis or regurgitation.  No valvular vegetations. Pulmonic Valve Structurally normal pulmonic valve without significant stenosis or regurgitation.  No valvular vegetations. Pericardium Normal pericardium without effusion. Aorta The aortic root is normal. Leif Espino MD (Electronically Signed) Final Date:     05 September 2021                 12:14      Micro:  Results     Procedure Component Value Units Date/Time    BLOOD CULTURE [[id number]] Collected: 09/03/21 0319    Order Status: Completed Specimen: Blood from Peripheral Updated: 09/08/21 0500     Significant Indicator NEG     Source BLD     Site PERIPHERAL     Culture Result No growth after 5 days of incubation.    Narrative:      Per Hospital Policy: Only change Specimen Src: to \"Line\" if  specified by physician order.  Right Wrist    BLOOD CULTURE [[id number]] Collected: 09/06/21 0435    Order Status: Completed Specimen: Blood from Peripheral Updated: 09/07/21 0811     Significant Indicator NEG     Source BLD     Site PERIPHERAL     Culture Result No Growth  Note: Blood cultures are incubated for 5 days and  are monitored continuously.Positive blood cultures  are called to the RN and reported as soon as  they are identified.      Narrative:      Per Hospital Policy: Only change Specimen Src: to \"Line\" if  specified by physician order.  Right AC    BLOOD CULTURE [[id number]] Collected: 09/06/21 0435    Order Status: Completed Specimen: Blood from Peripheral Updated: 09/07/21 0811     Significant Indicator NEG     Source BLD     Site PERIPHERAL     Culture Result No Growth  Note: Blood cultures are incubated for 5 days and  are monitored continuously.Positive blood cultures  are called to the RN and reported as soon as  they are identified.      " "Narrative:      Per Hospital Policy: Only change Specimen Src: to \"Line\" if  specified by physician order.  Left AC    BLOOD CULTURE [[id number]]  (Abnormal) Collected: 09/03/21 0319    Order Status: Completed Specimen: Blood from Peripheral Updated: 09/05/21 1006     Significant Indicator POS     Source BLD     Site PERIPHERAL     Culture Result Growth detected by Bactec instrument. 09/04/2021  08:57      Escherichia coli  See previous culture for sensitivity report.      Narrative:      CALL  Lane  141 tel. [phone],  CALLED  141 tel. [phone] 09/04/2021, 08:58, RB PERF. RESULTS CALLED  TO:[phone]  Per Hospital Policy: Only change Specimen Src: to \"Line\" if  specified by physician order.  Right AC    BLOOD CULTURE [[id number]]  (Abnormal) Collected: 09/01/21 1347    Order Status: Completed Specimen: Blood from Peripheral Updated: 09/04/21 0902     Significant Indicator POS     Source BLD     Site PERIPHERAL     Culture Result Growth detected by Bactec instrument. 09/02/2021  07:26      Escherichia coli  See previous culture for sensitivity report.      Narrative:      CALL  Lane  141 tel. [phone],  CALLED  141 tel. [phone] 09/02/2021, 07:26, RB PERF. RESULTS CALLED  TO:[phone] PRISCILLA Alvarez  Per Hospital Policy: Only change Specimen Src: to \"Line\" if  specified by physician order.  Left Hand    BLOOD CULTURE [[id number]]  (Abnormal)  (Susceptibility) Collected: 09/01/21 1347    Order Status: Completed Specimen: Blood from Peripheral Updated: 09/04/21 0902     Significant Indicator POS     Source BLD     Site PERIPHERAL     Culture Result Growth detected by Bactec instrument. 09/02/2021  06:22      Escherichia coli    Narrative:      CALL  Lane  141 tel. [phone],  CALLED  141 tel. [phone] 09/02/2021, 06:25, RB PERF. RESULTS CALLED TO:  [phone], RN  Per Hospital Policy: Only change Specimen Src: to \"Line\" if  specified by physician order.  Left Forearm/Arm    Susceptibility     Escherichia coli (1)     " "Antibiotic Interpretation Microscan Method Status    Ampicillin Sensitive <=8 mcg/mL KALYN Final    Ceftriaxone Sensitive <=1 mcg/mL KALYN Final    Cefazolin Sensitive <=2 mcg/mL KALYN Final    Ciprofloxacin Sensitive <=0.25 mcg/mL KALYN Final    Cefepime Sensitive <=2 mcg/mL KALYN Final    Cefuroxime Sensitive <=4 mcg/mL KALYN Final    Ampicillin/sulbactam Sensitive <=4/2 mcg/mL KALYN Final    Tobramycin Sensitive <=2 mcg/mL KALYN Final    Ertapenem Sensitive <=0.5 mcg/mL KALYN Final    Gentamicin Sensitive <=2 mcg/mL KALYN Final    Moxifloxacin Sensitive <=2 mcg/mL KALYN Final    Pip/Tazobactam Sensitive <=8 mcg/mL KALYN Final    Trimeth/Sulfa Sensitive <=0.5/9.5 mcg/mL KALYN Final    Tigecycline Sensitive <=2 mcg/mL KALYN Final                   URINE CULTURE-EXISTING-LESS THAN 48 HOURS [[id number]] Collected: 08/31/21 0634    Order Status: Completed Specimen: Urine, Clean Catch Updated: 09/03/21 1018     Significant Indicator NEG     Source UR     Site URINE, CLEAN CATCH     Culture Result Usual urogenital sammie ,000 cfu/mL    Narrative:      Please obtain from prior Urine sample  Indication for culture:->New onset fever with no other  identified cause  Please obtain from prior Urine sample    BLOOD CULTURE [[id number]]  (Abnormal) Collected: 08/31/21 0230    Order Status: Completed Specimen: Blood from Peripheral Updated: 09/02/21 1017     Significant Indicator POS     Source BLD     Site PERIPHERAL     Culture Result Growth detected by Bactec instrument. 08/31/2021  15:42      Escherichia coli  See previous culture for sensitivity report.      Narrative:      CALL  Lane  141 tel. [phone],  CALLED  141 tel. [phone] 08/31/2021, 15:43, RB PERF. RESULTS CALLED  TO:[phone] RN  Per Hospital Policy: Only change Specimen Src: to \"Line\" if  specified by physician order.  No site indicated    BLOOD CULTURE [[id number]]  (Abnormal)  (Susceptibility) Collected: 08/31/21 0220    Order Status: Completed Specimen: Blood from Peripheral Updated: " "09/02/21 1015     Significant Indicator POS     Source BLD     Site PERIPHERAL     Culture Result Growth detected by Bactec instrument. 08/31/2021  15:40      Escherichia coli    Narrative:      CALL  Lane  141 tel. [phone],  CALLED  141 tel. [phone] 08/31/2021, 15:42, RB PERF. RESULTS CALLED  TO:[phone] RN  Per Hospital Policy: Only change Specimen Src: to \"Line\" if  specified by physician order.  No site indicated    Susceptibility     Escherichia coli (1)     Antibiotic Interpretation Microscan Method Status    Ampicillin Sensitive <=8 mcg/mL KALYN Final    Ceftriaxone Sensitive <=1 mcg/mL KALYN Final    Cefazolin Sensitive <=2 mcg/mL KALYN Final    Ciprofloxacin Sensitive <=0.25 mcg/mL KALYN Final    Cefepime Sensitive <=2 mcg/mL KALYN Final    Cefuroxime Sensitive <=4 mcg/mL KALYN Final    Ampicillin/sulbactam Sensitive <=4/2 mcg/mL KALYN Final    Tobramycin Sensitive <=2 mcg/mL KALYN Final    Ertapenem Sensitive <=0.5 mcg/mL KALYN Final    Gentamicin Sensitive <=2 mcg/mL KALYN Final    Moxifloxacin Sensitive <=2 mcg/mL KALYN Final    Pip/Tazobactam Sensitive <=8 mcg/mL KALYN Final    Trimeth/Sulfa Sensitive <=0.5/9.5 mcg/mL KALYN Final    Tigecycline Sensitive <=2 mcg/mL KALYN Final                         Assessment:  Active Hospital Problems    Diagnosis    • *Bacteremia [R78.81]    • MICHALEA (obstructive sleep apnea) [G47.33]    • Sepsis ([facility]) [A41.9]    • Thrombocytopenia ([facility]) [D69.6]    • COPD (chronic obstructive pulmonary disease) ([facility]) [J44.9]    • Acute on chronic respiratory failure with hypoxia ([facility]) [J96.21]    • Tobacco abuse [Z72.0]    • Obesity [E66.9]    • Hypothyroid [E03.9]    • Uncontrolled type 1 diabetes mellitus ([facility]) [E10.65]      Interval 24 hours:      AF, O2 4 L NC   Labs reviewed  Micro reviewed    Patient doing well overall, some mild wheezing this morning but she states she did not see for breathing treatment.  Tolerated ciprofloxacin and EKG within normal limits    Assessment:  70-year-old female patient " with chronic hypoxemic respiratory failure on 4 L oxygen at home, type 1 diabetes, obesity, MICHAELA, presented with cough and shortness of breath, was found to have sepsis with E. coli bacteremia.  The radiologist did interpret a possible right middle lobe pneumonia.  However, despite being on appropriate antibiotics, repeat blood cultures x2 were positive for pansensitive E. coli which is unusual in the absence of source control issues.  Patient does have a right upper quadrant tenderness on exam.     Pertinent Diagnoses:  E. coli bacteremia, persistent  Type 1 diabetes  Chronic hypoxemic respiratory failure  MICHAELA  Obesity     Plan:  -Stop ceftriaxone and transition to ciprofloxacin 500 mg twice daily - end 9/21/21 - will plan on a 2-week antibiotic course from 1st negative blood culture given the persistent positive blood cultures.    -Discussed fluoroquinolones:  Medication should not be taken with multi vitamins.  Monitor blood sugars closely if diabetic. Discuss medication with health care professional if new joint or tendon pain. Seek medical attention if experience multiple watery bowel movements.  Pharmacy resident discussed medication interactions and medication at length with patient  EKG on 9/8  QTc:430   -Blood cultures positive from 8/31, 9/1, 9/3   -Repeat blood cultures from 9/6 are so far no growth   -CT chest abdomen pelvis with no source control issues.  Noted multiple small wedge-shaped lesions in the liver concerning for hepatic infarcts from possible septic embolization  -TTE with no obvious valvular vegetations  -MIMI with no vegetations or significant valvular disease  -Given the persistent positive blood culture and the possible hepatic infarcts will recommend surveillance blood cultures 1 week after stopping antibiotics    Okay for discharge from ID perspective on oral antibiotics as above         Discussed with internal medicine, Dr. Gracia.  ID will sign off.

## 2021-09-08 NOTE — TELEPHONE ENCOUNTER
New start       COPD Program  MR # [id number]  English speaking  Michelle Espinoza   4 lpm and CPAP machine at night   [address] [place] [address] [phone]

## 2021-09-08 NOTE — RESPIRATORY CARE
REMOTE MONITORING PROGRAM by RESPIRATORY THERAPY  9/8/2021 at 11:26 AM by Yara Crockett, RRT   Patient agrees to COPD Remote Monitoring program. Device instruction performed with welcome packet, consent signed and placed at bedside chart. Patient instructed on how to use MyChart and Zoom. No questions at this time.     Flyer and education performed on remote monitoring with bedside RN complete.  She will be on 4 lpm

## 2021-09-08 NOTE — CARE PLAN
Problem: Risk for Infection - COPD  Goal: Patient will remain free from signs and symptoms of infection  Outcome: Progressing     Problem: Pain - Standard  Goal: Alleviation of pain or a reduction in pain to the patient’s comfort goal  Outcome: Progressing   The patient is Stable - Low risk of patient condition declining or worsening    Shift Goals  Clinical Goals: pain control, sleep comfortably  Patient Goals: glucose control  Family Goals: N/a    Progress made toward(s) clinical / shift goals:  yes    Pt ambulates up ad luis; PRN pain meds in use; using IS, and education reiterated on infection control and to watch for s/s of worsening resp symptoms.

## 2021-09-08 NOTE — CARE PLAN
Problem: Knowledge Deficit - Standard  Goal: Patient and family/care givers will demonstrate understanding of plan of care, disease process/condition, diagnostic tests and medications  Outcome: Progressing  Patient educated on medication    Problem: Risk for Infection - COPD  Goal: Patient will remain free from signs and symptoms of infection  Outcome: Progressing  Pt displays no sign of infection, vital signs stable     Problem: Self Care  Goal: Patient will have the ability to perform ADLs independently or with assistance (bathe, groom, dress, toilet and feed)  Outcome: Progressing   Patient able to effectively perform ADL's independently this shift     The patient is Stable - Low risk of patient condition declining or worsening    Shift Goals  Clinical Goals: Glucose Control   Patient Goals: Glucose Control   Family Goals: N/a    Progress made toward(s) clinical / shift goals:  Pt blood sugar medicated per MAR    Patient is not progressing towards the following goals:

## 2021-09-08 NOTE — PROGRESS NOTES
Received report from previous shift RN  Assessment complete.  A&O x 4. Patient calls appropriately.  Patient ambulates with no assist.   Patient has 0/10 pain.   Denies N&V. Tolerating diabetic diet.  + void, + flatus, 9/8 BM.  Patient denies SOB.  Plan to d/c patient today.  Review plan with of care with patient. Call light and personal belongings with in reach. Hourly rounding in place. All needs met at this time.

## 2021-09-08 NOTE — DISCHARGE SUMMARY
Discharge Summary    CHIEF COMPLAINT ON ADMISSION  Chief Complaint   Patient presents with   • Shortness of Breath     c/o increasing shortness of breath started last night. patient on 4-5 liters oxygen 24/7 for COPD.    • Generalized Body Aches     x 1 week   • Fever     started last night. + chills. afebrile in triage       Reason for Admission  SOB, Fever and body aches     Admission Date  8/31/2021    CODE STATUS  Full Code    HPI & HOSPITAL COURSE  70-year-old female with history of smoking, insulin and COPD on 4 L nasal cannula with obstructive sleep apnea presented 8/31 with shortness of breath and fever also she had [place] with generalized weakness, on admission labs showed leukocytosis with elevated procalcitonin and chest x-ray showed right Evex infiltration possible infection versus malignancy, antibiotics was started and blood culture came back positive with E. Coli, with some improving on her symptoms however repeat blood culture was positive on 9/1 and repeat blood culture again on 9/3 was positive, CT abdomen and chest possible septic emboli in the liver and possible pyelonephritis also showed pneumonia, MIMI was done 9/5 and did not show any vegetation. Repeated Blood culture remain negative .  ID recommended 2 weeks of oral ciprofloxacin. At the time of discharge patient vitals remained stable, remained afebrile, asymptomatic, labs remain unremarkable.  Advised to  follow-up with PCP and repeat blood culture as outpatient discharge plan was discussed with patient.     Therefore, she is discharged in good and stable condition to home with close outpatient follow-up.    The patient met 2-midnight criteria for an inpatient stay at the time of discharge.    Discharge Date  9/8/2021          DISCHARGE DIAGNOSES  Principal Problem:    Bacteremia POA: Unknown      Overview: ecoli      Following culture  Active Problems:    Uncontrolled type 1 diabetes mellitus (HCC) POA: Yes      Overview: ICD-10  transition    Hypothyroid (Chronic) POA: Yes    Obesity POA: Yes    Tobacco abuse (Chronic) POA: Yes    Acute on chronic respiratory failure with hypoxia (HCC) POA: Yes    COPD (chronic obstructive pulmonary disease) (HCC) POA: Yes    Sepsis (HCC) POA: Yes    Thrombocytopenia (HCC) POA: Yes    MICHAELA (obstructive sleep apnea) POA: Unknown  Resolved Problems:    Hypertension POA: Yes      FOLLOW UP  Future Appointments   Date Time Provider Department Center   11/29/2021 11:10 AM CANDICE Ferrell     No follow-up provider specified.    MEDICATIONS ON DISCHARGE     Medication List      START taking these medications      Instructions   amLODIPine 10 MG Tabs  Start taking on: September 9, 2021  Commonly known as: NORVASC   Take 1 Tablet by mouth every day for 30 days.  Dose: 10 mg     ciprofloxacin 500 MG Tabs  Commonly known as: CIPRO   Take 1 Tablet by mouth every 12 hours for 14 days.  Dose: 500 mg        CONTINUE taking these medications      Instructions   albuterol 108 (90 Base) MCG/ACT Aers inhalation aerosol   Inhale 1-2 Puffs every four hours as needed for Shortness of Breath.  Dose: 1-2 Puff     aspirin EC 81 MG Tbec  Commonly known as: ECOTRIN   Take 81 mg by mouth every day.  Dose: 81 mg     B Complex Caps   Take 1 Cap by mouth every day.  Dose: 1 Capsule     captopril 12.5 MG Tabs  Commonly known as: CAPOTEN   Take 12.5 mg by mouth every day.  Dose: 12.5 mg     CeleBREX 200 MG Caps  Generic drug: celecoxib   Take 200 mg by mouth 2 times a day.  Dose: 200 mg     Centrum Silver 50+Women Tabs   Take 1 Tablet by mouth every day.  Dose: 1 Tablet     cetirizine 10 MG Tabs  Commonly known as: ZYRTEC   Take 10 mg by mouth 2 times a day.  Dose: 10 mg     Evista 60 MG Tabs  Generic drug: raloxifene   Take 60 mg by mouth every day.  Dose: 60 mg     Fiasp 100 UNIT/ML Soln  Generic drug: Insulin Aspart (w/Niacinamide)   Inject 5-10 Units under the skin 3 times a day before meals. 1 unit per 5  grams of carbs  Pt takes with snacks as well as 3 times a day  Dose: 5-10 Units     FLUoxetine 20 MG Caps  Commonly known as: PROZAC   Take 20 mg by mouth every day.  Dose: 20 mg     ipratropium-albuterol 0.5-2.5 (3) MG/3ML nebulizer solution  Commonly known as: DUONEB   3 mL by Nebulization route every 6 hours as needed for Shortness of Breath.  Dose: 3 mL     levothyroxine 112 MCG Tabs  Commonly known as: SYNTHROID   Take 1 tablet by mouth Every morning on an empty stomach.  Dose: 112 mcg     lovastatin 20 MG Tabs  Commonly known as: MEVACOR   Take 20 mg by mouth every evening.  Dose: 20 mg     montelukast 10 MG Tabs  Commonly known as: SINGULAIR   Take 10 mg by mouth every day.  Dose: 10 mg     NexIUM 20 MG capsule  Generic drug: esomeprazole   Take 20 mg by mouth 2 times a day.  Dose: 20 mg     Qvar 80 MCG/ACT inhaler  Generic drug: beclomethasone   Inhale 2 Puffs by mouth 2 times a day. Indications: Chronic Bronchitis with Asthma  Dose: 2 Puff     Spiriva HandiHaler 18 MCG Caps  Generic drug: tiotropium   Inhale 18 mcg by mouth every day.  Dose: 18 mcg     Tresiba FlexTouch 100 UNIT/ML Sopn  Generic drug: Insulin Degludec   Inject 44 Units under the skin every day.  Dose: 44 Units     VITAMIN B 12 PO   Take 1 Tablet by mouth every day.  Dose: 1 Tablet     vitamin D 2000 UNIT Tabs   Take 4,000 Units by mouth every day.  Dose: 4,000 Units            Allergies  Allergies   Allergen Reactions   • Bactrim [Sulfamethoxazole W-Trimethoprim] Rash     hives   • Percocet [Oxycodone-Acetaminophen] Vomiting       DIET  Orders Placed This Encounter   Procedures   • Diet Order Diet: Consistent CHO (Diabetic) (Please add yogurt to trays)     Standing Status:   Standing     Number of Occurrences:   1     Order Specific Question:   Diet:     Answer:   Consistent CHO (Diabetic) [4]     Comments:   Please add yogurt to trays       ACTIVITY  As tolerated.  Weight bearing as tolerated    CONSULTATIONS  ID        LABORATORY  Lab  Results   Component Value Date    SODIUM 141 09/06/2021    POTASSIUM 3.7 09/06/2021    CHLORIDE 105 09/06/2021    CO2 27 09/06/2021    GLUCOSE 148 (H) 09/06/2021    BUN 13 09/06/2021    CREATININE 0.94 09/06/2021        Lab Results   Component Value Date    WBC 8.0 09/06/2021    HEMOGLOBIN 12.4 09/06/2021    HEMATOCRIT 37.4 09/06/2021    PLATELETCT 216 09/06/2021        Total time of the discharge process exceeds  39  minutes.

## 2021-09-08 NOTE — PROGRESS NOTES
Bedside report received.  Assessment complete.  A&O x 4. Patient calls appropriately.  Patient ambulates without assistance.   Patient has 6/10 pain. Pain managed with prescribed medications.  Denies N&V. Tolerating diabetic diet.  + void, last BM 9/6.  Pt on 3-4 L O2 (baseline)  Review plan with of care with patient. Call light and personal belongings within reach. Hourly rounding in place. All needs met at this time.

## 2021-09-08 NOTE — DISCHARGE PLANNING
Meds-to-Beds: Discharge prescription orders listed below delivered to patient's bedside. PRISCILLA Anderson notified. Patient counseled. Reviewed potential drug-drug interactions. Patient elected to have co-payment billed to patient account.      Current Outpatient Medications   Medication Sig Dispense Refill   • [START ON 9/9/2021] amLODIPine (NORVASC) 10 MG Tab Take 1 Tablet by mouth every day for 30 days. 30 Tablet 0   • ciprofloxacin (CIPRO) 500 MG Tab Take 1 Tablet by mouth every 12 hours for 14 days. 28 Tablet 0      June Majano, PharmD

## 2021-09-09 ENCOUNTER — TELEPHONE (OUTPATIENT)
Dept: OTHER | Facility: MEDICAL CENTER | Age: 71
End: 2021-09-09

## 2021-09-09 VITALS — OXYGEN SATURATION: 92 % | HEART RATE: 83 BPM | RESPIRATION RATE: 8 BRPM

## 2021-09-09 VITALS — RESPIRATION RATE: 27 BRPM | OXYGEN SATURATION: 86 % | HEART RATE: 86 BPM

## 2021-09-09 VITALS — HEART RATE: 81 BPM | RESPIRATION RATE: 30 BRPM | OXYGEN SATURATION: 86 %

## 2021-09-09 VITALS — RESPIRATION RATE: 32 BRPM | HEART RATE: 87 BPM | OXYGEN SATURATION: 86 %

## 2021-09-09 VITALS — OXYGEN SATURATION: 84 % | RESPIRATION RATE: 14 BRPM | HEART RATE: 83 BPM

## 2021-09-09 VITALS — HEART RATE: 88 BPM | RESPIRATION RATE: 28 BRPM | OXYGEN SATURATION: 85 %

## 2021-09-10 ENCOUNTER — TELEPHONE (OUTPATIENT)
Dept: OTHER | Facility: MEDICAL CENTER | Age: 71
End: 2021-09-10

## 2021-09-10 VITALS — RESPIRATION RATE: 27 BRPM | HEART RATE: 72 BPM | OXYGEN SATURATION: 86 %

## 2021-09-10 VITALS — OXYGEN SATURATION: 84 % | HEART RATE: 71 BPM | RESPIRATION RATE: 25 BRPM

## 2021-09-10 NOTE — TELEPHONE ENCOUNTER
Pt alerted for low oxygen at 2135. Her concentrator is still not working so she is using her portable.

## 2021-09-10 NOTE — TELEPHONE ENCOUNTER
Pt alerted for low oxygen at 2022. She stated that her oxygen concentrator had broken and she was waiting for it to cool down. I advised her to call her oxygen company to have someone sent out to fix it.

## 2021-09-10 NOTE — TELEPHONE ENCOUNTER
Pt alerted for low oxygen at 2351. Concentrator still not working but she stated she felt fine.    0011  Concentrator still not working but she stated she felt fine.

## 2021-09-11 ENCOUNTER — TELEPHONE (OUTPATIENT)
Dept: OTHER | Facility: MEDICAL CENTER | Age: 71
End: 2021-09-11

## 2021-09-11 VITALS — OXYGEN SATURATION: 86 % | HEART RATE: 79 BPM | RESPIRATION RATE: 27 BRPM

## 2021-09-11 LAB
BACTERIA BLD CULT: NORMAL
BACTERIA BLD CULT: NORMAL
SIGNIFICANT IND 70042: NORMAL
SIGNIFICANT IND 70042: NORMAL
SITE SITE: NORMAL
SITE SITE: NORMAL
SOURCE SOURCE: NORMAL
SOURCE SOURCE: NORMAL

## 2021-09-13 ENCOUNTER — APPOINTMENT (OUTPATIENT)
Dept: RADIOLOGY | Facility: MEDICAL CENTER | Age: 71
End: 2021-09-13
Attending: EMERGENCY MEDICINE
Payer: MEDICARE

## 2021-09-13 ENCOUNTER — TELEPHONE (OUTPATIENT)
Dept: OTHER | Facility: MEDICAL CENTER | Age: 71
End: 2021-09-13

## 2021-09-13 ENCOUNTER — HOSPITAL ENCOUNTER (EMERGENCY)
Facility: MEDICAL CENTER | Age: 71
End: 2021-09-14
Attending: EMERGENCY MEDICINE
Payer: MEDICARE

## 2021-09-13 ENCOUNTER — TELEPHONE (OUTPATIENT)
Dept: ENDOCRINOLOGY | Facility: MEDICAL CENTER | Age: 71
End: 2021-09-13

## 2021-09-13 DIAGNOSIS — R06.02 SHORTNESS OF BREATH: ICD-10-CM

## 2021-09-13 LAB
ALBUMIN SERPL BCP-MCNC: 3.5 G/DL (ref 3.2–4.9)
ALBUMIN/GLOB SERPL: 1 G/DL
ALP SERPL-CCNC: 95 U/L (ref 30–99)
ALT SERPL-CCNC: 11 U/L (ref 2–50)
ANION GAP SERPL CALC-SCNC: 12 MMOL/L (ref 7–16)
AST SERPL-CCNC: 9 U/L (ref 12–45)
BASOPHILS # BLD AUTO: 0.4 % (ref 0–1.8)
BASOPHILS # BLD: 0.05 K/UL (ref 0–0.12)
BILIRUB SERPL-MCNC: 0.3 MG/DL (ref 0.1–1.5)
BUN SERPL-MCNC: 10 MG/DL (ref 8–22)
CALCIUM SERPL-MCNC: 9.2 MG/DL (ref 8.5–10.5)
CHLORIDE SERPL-SCNC: 102 MMOL/L (ref 96–112)
CO2 SERPL-SCNC: 23 MMOL/L (ref 20–33)
CREAT SERPL-MCNC: 0.98 MG/DL (ref 0.5–1.4)
EOSINOPHIL # BLD AUTO: 0.09 K/UL (ref 0–0.51)
EOSINOPHIL NFR BLD: 0.8 % (ref 0–6.9)
ERYTHROCYTE [DISTWIDTH] IN BLOOD BY AUTOMATED COUNT: 45.1 FL (ref 35.9–50)
GLOBULIN SER CALC-MCNC: 3.6 G/DL (ref 1.9–3.5)
GLUCOSE BLD-MCNC: 181 MG/DL (ref 65–99)
GLUCOSE SERPL-MCNC: 185 MG/DL (ref 65–99)
HCT VFR BLD AUTO: 38 % (ref 37–47)
HGB BLD-MCNC: 12.9 G/DL (ref 12–16)
IMM GRANULOCYTES # BLD AUTO: 0.09 K/UL (ref 0–0.11)
IMM GRANULOCYTES NFR BLD AUTO: 0.8 % (ref 0–0.9)
LACTATE BLD-SCNC: 1.6 MMOL/L (ref 0.5–2)
LYMPHOCYTES # BLD AUTO: 1.16 K/UL (ref 1–4.8)
LYMPHOCYTES NFR BLD: 9.7 % (ref 22–41)
MCH RBC QN AUTO: 32.6 PG (ref 27–33)
MCHC RBC AUTO-ENTMCNC: 33.9 G/DL (ref 33.6–35)
MCV RBC AUTO: 96 FL (ref 81.4–97.8)
MONOCYTES # BLD AUTO: 0.83 K/UL (ref 0–0.85)
MONOCYTES NFR BLD AUTO: 7 % (ref 0–13.4)
NEUTROPHILS # BLD AUTO: 9.72 K/UL (ref 2–7.15)
NEUTROPHILS NFR BLD: 81.3 % (ref 44–72)
NRBC # BLD AUTO: 0 K/UL
NRBC BLD-RTO: 0 /100 WBC
PLATELET # BLD AUTO: 376 K/UL (ref 164–446)
PMV BLD AUTO: 10.4 FL (ref 9–12.9)
POTASSIUM SERPL-SCNC: 3.8 MMOL/L (ref 3.6–5.5)
PROT SERPL-MCNC: 7.1 G/DL (ref 6–8.2)
RBC # BLD AUTO: 3.96 M/UL (ref 4.2–5.4)
SODIUM SERPL-SCNC: 137 MMOL/L (ref 135–145)
WBC # BLD AUTO: 11.9 K/UL (ref 4.8–10.8)

## 2021-09-13 PROCEDURE — 80053 COMPREHEN METABOLIC PANEL: CPT

## 2021-09-13 PROCEDURE — 36415 COLL VENOUS BLD VENIPUNCTURE: CPT

## 2021-09-13 PROCEDURE — U0003 INFECTIOUS AGENT DETECTION BY NUCLEIC ACID (DNA OR RNA); SEVERE ACUTE RESPIRATORY SYNDROME CORONAVIRUS 2 (SARS-COV-2) (CORONAVIRUS DISEASE [COVID-19]), AMPLIFIED PROBE TECHNIQUE, MAKING USE OF HIGH THROUGHPUT TECHNOLOGIES AS DESCRIBED BY CMS-2020-01-R: HCPCS

## 2021-09-13 PROCEDURE — U0005 INFEC AGEN DETEC AMPLI PROBE: HCPCS

## 2021-09-13 PROCEDURE — 83605 ASSAY OF LACTIC ACID: CPT

## 2021-09-13 PROCEDURE — 82962 GLUCOSE BLOOD TEST: CPT

## 2021-09-13 PROCEDURE — 84145 PROCALCITONIN (PCT): CPT

## 2021-09-13 PROCEDURE — 99284 EMERGENCY DEPT VISIT MOD MDM: CPT | Mod: 25

## 2021-09-13 PROCEDURE — 87040 BLOOD CULTURE FOR BACTERIA: CPT | Mod: 91

## 2021-09-13 PROCEDURE — 71045 X-RAY EXAM CHEST 1 VIEW: CPT

## 2021-09-13 PROCEDURE — 85025 COMPLETE CBC W/AUTO DIFF WBC: CPT

## 2021-09-13 ASSESSMENT — FIBROSIS 4 INDEX: FIB4 SCORE: 1.73

## 2021-09-13 NOTE — TELEPHONE ENCOUNTER
Michelle called to let us know that she was hospitalized for 9 days with sepsis and pneumonia.  Her blood sugars have been running in the 200-300 range even with increased insulin.  She will increase her Tresiba to 50 units daily and increase by 3 units every 3 days until fasting blood sugar is <120.  She will also increase her FIASP to 10 units before meals.  She will call with an update on how her blood sugars are doing.

## 2021-09-14 ENCOUNTER — TELEPHONE (OUTPATIENT)
Dept: SLEEP MEDICINE | Facility: MEDICAL CENTER | Age: 71
End: 2021-09-14

## 2021-09-14 ENCOUNTER — TELEPHONE (OUTPATIENT)
Dept: OTHER | Facility: MEDICAL CENTER | Age: 71
End: 2021-09-14

## 2021-09-14 VITALS
HEIGHT: 68 IN | OXYGEN SATURATION: 98 % | SYSTOLIC BLOOD PRESSURE: 125 MMHG | DIASTOLIC BLOOD PRESSURE: 65 MMHG | BODY MASS INDEX: 35.01 KG/M2 | RESPIRATION RATE: 16 BRPM | WEIGHT: 231 LBS | HEART RATE: 62 BPM | TEMPERATURE: 97.5 F

## 2021-09-14 DIAGNOSIS — J41.0 SIMPLE CHRONIC BRONCHITIS (HCC): ICD-10-CM

## 2021-09-14 LAB
BLOOD CULTURE HOLD CXBCH: NORMAL
BLOOD CULTURE HOLD CXBCH: NORMAL
PROCALCITONIN SERPL-MCNC: 0.14 NG/ML
SARS-COV-2 RNA RESP QL NAA+PROBE: NOTDETECTED
SPECIMEN SOURCE: NORMAL

## 2021-09-14 NOTE — TELEPHONE ENCOUNTER
Called to ask us if we could take her temp. Said her thermometer is reading 100.5, but she doesn't think that's right. Let her know we are unable to do that for her. If she felt concerned about her temp, she could come in, or we can call [facility] for her.

## 2021-09-14 NOTE — TELEPHONE ENCOUNTER
Pt requesting to be removed from remote pt monitoring  D/c order placed  Not a pulm pt  Pulm will reach out to discuss follow up

## 2021-09-14 NOTE — ED NOTES
covid swab collected and sent to lab. Per ERP do not need to collect BC at this time. Pt repositioned for comfort. No distress, call light within reach.

## 2021-09-14 NOTE — ED NOTES
DC home with written and verbal instructions regarding f/u, activity and where to find lab results.  Verbalized understanding, WC out with home O2 and all other belongings.

## 2021-09-14 NOTE — ED NOTES
2 sets of BC collected and sent to lab. Per ERP, urine sample not needed at this time. Pt to be discharged home.

## 2021-09-14 NOTE — TELEPHONE ENCOUNTER
Called pt due to disconnecting from system. Pt stated her battery is dead doesn't want to replace it barrett to wanting to quit the program. Escalate to leader regarding pt request.

## 2021-09-14 NOTE — ED TRIAGE NOTES
Michelle Espinoza  70 y.o. F  Chief Complaint   Patient presents with   • Fever     Patient was hospitalized for sepsis related to Pneumonia in August. Patient was told to return to the ER for any fevers. Patient reports a 102 fever at home. Patient has a history of COPD on 4L NC, patient on 5L NC   • Fatigue     x 2 weeks   • Hyperglycemia     x 2 weeks FSBS 181 in triage      Sepsis score of 4. Protocol ordered. Charge RN notified.    Vitals:    09/13/21 1957   BP: 146/83   Pulse: (Abnormal) 105   Resp: 18   Temp: 37.3 °C (99.1 °F)   SpO2: 95%     Triage process explained to patient, apologized for wait time, and returned to lobby.  Pt informed to notify staff of any change in condition.

## 2021-09-14 NOTE — DISCHARGE INSTRUCTIONS
Check my chart within the next 24 to 48 hours for the results of your Covid test.  Your blood cultures will be pending for the next 1 to 5 days and we will call you if they are positive

## 2021-09-14 NOTE — ED NOTES
PT vitals rechecked; no obvious signs of distress at this time. PT aware that room has been assigned.

## 2021-09-14 NOTE — ED PROVIDER NOTES
"ED Provider Note    CHIEF COMPLAINT  Chief Complaint   Patient presents with   • Fever     Patient was hospitalized for sepsis related to Pneumonia in August. Patient was told to return to the ER for any fevers. Patient reports a 102 fever at home. Patient has a history of COPD on 4L NC, patient on 5L NC   • Fatigue     x 2 weeks   • Hyperglycemia     x 2 weeks FSBS 181 in triage        HPI  Michelle Espinoza is a 70 y.o. female who presents for evaluation of a fever at home.  Patient was just released 4 days ago after hospital stay for pneumonia and was told to come back if she developed a fever.  She notes she took an oral temperature today because she felt \"chilled\" and had a fever up to 100.  She notes she has chronic shortness of breath and is on supplemental oxygen at home however she has not had to turn it up.    REVIEW OF SYSTEMS  Constitutional: Fevers and chills noted  Skin: No rashes, abrasions, lacerations, or pruritus  HEENT: No sore throat, runny nose  Neck: No neck pain, stiffness, or masses.  Chest: No pain or rashes  Pulm: Chronic shortness of breath with cough.No wheezing, stridor, or pain with inspiration/expiration  Gastrointestinal: No nausea, vomiting, diarrhea, constipation, bloating, melena, hematochezia or abdominal pain.  Genitourinary: No dysuria or hematuria  Musculoskeletal: No recent trauma, pain, swelling, weakness  Neurologic: No sensory or motor changes. No confusion or disorientation.  Heme: No bleeding or bruising problems.   Immuno: Recent pneumonia      PAST MEDICAL HISTORY   has a past medical history of Anesthesia, Arthritis, ASTHMA, Cancer ([facility]), Dental disorder, Diabetes, Diabetic neuropathy ([facility]), EMPHYSEMA, Encounter for long-term (current) use of insulin ([facility]) (4/23/2014), Fitting and adjustment of insulin pump (4/23/2014), Heart burn, osteopenia (4/23/2014), Pain, Psychiatric problem, Sleep apnea, and Unspecified disorder of thyroid.    SOCIAL HISTORY  Social History "     Tobacco Use   • Smoking status: Current Every Day Smoker     Packs/day: 1.00     Years: 40.00     Pack years: 40.00     Types: Cigarettes   • Smokeless tobacco: Never Used   Vaping Use   • Vaping Use: Never used   Substance and Sexual Activity   • Alcohol use: Yes     Comment: 2 per day   • Drug use: No   • Sexual activity: Not on file       SURGICAL HISTORY   has a past surgical history that includes mastectomy (1987, 2001); mastectomy (1992); breast reconstruction (2001); and hip arthroplasty total (9/9/2013).    CURRENT MEDICATIONS  Home Medications     Reviewed by Renita Love R.N. (Registered Nurse) on 09/13/21 at 2009  Med List Status: Not Addressed   Medication Last Dose Status   albuterol 108 (90 Base) MCG/ACT Aero Soln inhalation aerosol  Active   amLODIPine (NORVASC) 10 MG Tab  Active   aspirin EC (ECOTRIN) 81 MG TBEC  Active   B Complex Cap  Active   beclomethasone (QVAR) 80 MCG/ACT inhaler  Active   captopril (CAPOTEN) 12.5 MG Tab  Active   celecoxib (CELEBREX) 200 MG CAPS  Active   cetirizine (ZYRTEC) 10 MG Tab  Active   Cholecalciferol (VITAMIN D) 2000 UNIT Tab  Active   ciprofloxacin (CIPRO) 500 MG Tab  Active   Cyanocobalamin (VITAMIN B 12 PO)  Active   esomeprazole (NEXIUM) 20 MG capsule  Active   fluoxetine (PROZAC) 20 MG CAPS  Active   Insulin Aspart, w/Niacinamide, (FIASP) 100 UNIT/ML Solution  Active   Insulin Degludec (TRESIBA FLEXTOUCH) 100 UNIT/ML Solution Pen-injector  Active   ipratropium-albuterol (DUONEB) 0.5-2.5 (3) MG/3ML nebulizer solution  Active   levothyroxine (SYNTHROID) 112 MCG Tab  Active   lovastatin (MEVACOR) 20 MG TABS  Active   montelukast (SINGULAIR) 10 MG Tab  Active   Multiple Vitamins-Minerals (CENTRUM SILVER 50+WOMEN) Tab  Active   raloxifene (EVISTA) 60 MG TABS  Active   tiotropium (SPIRIVA HANDIHALER) 18 MCG CAPS  Active                ALLERGIES  Allergies   Allergen Reactions   • Bactrim [Sulfamethoxazole W-Trimethoprim] Rash     hives   • Percocet  "[Oxycodone-Acetaminophen] Vomiting       PHYSICAL EXAM  VITAL SIGNS: /65   Pulse 62   Temp 36.4 °C (97.5 °F)   Resp 16   Ht 1.727 m (5' 8\")   Wt 105 kg (231 lb)   SpO2 98%   BMI 35.12 kg/m²    Gen: Alert in no apparent distress.  HEENT: No signs of trauma, Bilateral external ears normal, Nose normal. Conjunctiva normal, Non-icteric.   Cardiovascular: Regular rate and rhythm, no murmurs.  Capillary refill less than 3 seconds to all extremities, 2+ distal pulses.  Thorax & Lungs: Normal breath sounds, No respiratory distress, No wheezing bilateral chest rise  Abdomen: Bowel sounds normal, Soft, No tenderness, No masses, No pulsatile masses. No Guarding or rebound  Skin: Warm, Dry, No erythema, No rash noted to exposed areas.   Extremities: Intact distal pulses, No edema  Neurologic: Alert , no facial droop, grossly normal coordination and strength  Psychiatric: Affect pleasant    LABS  Results for orders placed or performed during the hospital encounter of 09/13/21   Lactic acid (lactate)   Result Value Ref Range    Lactic Acid 1.6 0.5 - 2.0 mmol/L   CBC WITH DIFFERENTIAL   Result Value Ref Range    WBC 11.9 (H) 4.8 - 10.8 K/uL    RBC 3.96 (L) 4.20 - 5.40 M/uL    Hemoglobin 12.9 12.0 - 16.0 g/dL    Hematocrit 38.0 37.0 - 47.0 %    MCV 96.0 81.4 - 97.8 fL    MCH 32.6 27.0 - 33.0 pg    MCHC 33.9 33.6 - 35.0 g/dL    RDW 45.1 35.9 - 50.0 fL    Platelet Count 376 164 - 446 K/uL    MPV 10.4 9.0 - 12.9 fL    Neutrophils-Polys 81.30 (H) 44.00 - 72.00 %    Lymphocytes 9.70 (L) 22.00 - 41.00 %    Monocytes 7.00 0.00 - 13.40 %    Eosinophils 0.80 0.00 - 6.90 %    Basophils 0.40 0.00 - 1.80 %    Immature Granulocytes 0.80 0.00 - 0.90 %    Nucleated RBC 0.00 /100 WBC    Neutrophils (Absolute) 9.72 (H) 2.00 - 7.15 K/uL    Lymphs (Absolute) 1.16 1.00 - 4.80 K/uL    Monos (Absolute) 0.83 0.00 - 0.85 K/uL    Eos (Absolute) 0.09 0.00 - 0.51 K/uL    Baso (Absolute) 0.05 0.00 - 0.12 K/uL    Immature Granulocytes (abs) 0.09 " 0.00 - 0.11 K/uL    NRBC (Absolute) 0.00 K/uL   COMP METABOLIC PANEL   Result Value Ref Range    Sodium 137 135 - 145 mmol/L    Potassium 3.8 3.6 - 5.5 mmol/L    Chloride 102 96 - 112 mmol/L    Co2 23 20 - 33 mmol/L    Anion Gap 12.0 7.0 - 16.0    Glucose 185 (H) 65 - 99 mg/dL    Bun 10 8 - 22 mg/dL    Creatinine 0.98 0.50 - 1.40 mg/dL    Calcium 9.2 8.5 - 10.5 mg/dL    AST(SGOT) 9 (L) 12 - 45 U/L    ALT(SGPT) 11 2 - 50 U/L    Alkaline Phosphatase 95 30 - 99 U/L    Total Bilirubin 0.3 0.1 - 1.5 mg/dL    Albumin 3.5 3.2 - 4.9 g/dL    Total Protein 7.1 6.0 - 8.2 g/dL    Globulin 3.6 (H) 1.9 - 3.5 g/dL    A-G Ratio 1.0 g/dL   ESTIMATED GFR   Result Value Ref Range    GFR If African American >60 >60 mL/min/1.73 m 2    GFR If Non  56 (A) >60 mL/min/1.73 m 2   SARS-CoV-2 PCR (24 hour In-House): Collect NP swab in VTM    Specimen: Respirate   Result Value Ref Range    SARS-CoV-2 Source NP Swab    PROCALCITONIN   Result Value Ref Range    Procalcitonin 0.14 <0.25 ng/mL   Blood Culture,Hold   Result Value Ref Range    Blood Culture Hold Collected    Blood Culture,Hold   Result Value Ref Range    Blood Culture Hold Collected    POCT glucose device results   Result Value Ref Range    Glucose - Accu-Ck 181 (H) 65 - 99 mg/dL       RADIOLOGY  DX-CHEST-PORTABLE (1 VIEW)   Final Result         1.  Interstitial pulmonary parenchymal prominence, compatible with interstitial edema and/or infiltrates particularly in the right midlung.   2.  Cardiomegaly   3.  Atherosclerosis            COURSE & MEDICAL DECISION MAKING  Patient arrives for evaluation of symptoms of chills and a fever she measured at home with an oral thermometer.  Patient notes she was recently hospitalized for pneumonia and notes that she was discharged 4 days ago on oral Cipro.  This is consistent with her medical record and she was told to return if her fever returned.  This is presumably out of concern for bacteremia or a significant worsening  bacterial infection.  Patient notes she has no chest pain and has no measured fevers in the emergency department.  Her vital signs appear quite stable although she was mildly tachycardic on initial triage.  She was not tachycardic on my exam however.  Her labs had returned at the time of the initial exam and are reassuring although it is noted that she had a mildly elevated glucose.  Her white blood cell count was slightly elevated although it did not meet sepsis criteria.  I will add on a lactate and obtain a chest x-ray.  After discussion with the patient, I feel that if the diagnostics here today are reassuring and she does not develop a fever, that we could safely discharge her home, plus or minus new blood cultures.  0245  Patient in no distress, on her typical 4 L of oxygen and saturating well.  She has developed no new interval symptoms and states understanding of the plan for discharge after repeat blood cultures.  She was scheduled for repeat blood cultures in about a week after her antibiotics were through however I feel it is worthwhile as she was noted to have gram-negative bacteremia on admission.  There are blood cultures prior to her discharge that were negative however.  She is comfortable with the plan for discharge and will return if her symptoms worsen or change in any way.  She noted clear understanding that we will call her if the cultures become positive.    FINAL IMPRESSION  1. Shortness of breath        Electronically signed by: Stepan Chaparro M.D., 9/13/2021 11:12 PM

## 2021-09-21 ENCOUNTER — TELEPHONE (OUTPATIENT)
Dept: SLEEP MEDICINE | Facility: MEDICAL CENTER | Age: 71
End: 2021-09-21

## 2021-09-21 NOTE — TELEPHONE ENCOUNTER
----- Message from Paul Coy M.D. sent at 9/17/2021  9:09 AM PDT -----  She is off monitor this is to see if she would like a pulm evval post covid  ----- Message -----  From: Marly Pagan, Med Ass't  Sent: 9/14/2021  12:27 PM PDT  To: Paul Coy M.D.    Hi Dr Fierro,    Pt is scheduled for a telephone call for NICOLETTE day 5-7 covid. Are you referring to this and pt needs a hospital follow up instead?    Thanks,  Marly   ----- Message -----  From: Paul Coy M.D.  Sent: 9/14/2021   9:28 AM PDT  To: Marly Torres, Med Ass't    Hi    Not sure who to send it to but pt is copd exacerbation so needs a 7 day follow up post hospitalization  Anyone  Thanks

## 2021-09-23 NOTE — TELEPHONE ENCOUNTER
Called pt and lm, asking pt to return my call to schedule an appt with [facility]. Provided our call back number.

## 2021-09-29 ENCOUNTER — TELEPHONE (OUTPATIENT)
Dept: RESPIRATORY THERAPY | Facility: MEDICAL CENTER | Age: 71
End: 2021-09-29

## 2021-11-11 ENCOUNTER — TELEPHONE (OUTPATIENT)
Dept: HEALTH INFORMATION MANAGEMENT | Facility: OTHER | Age: 71
End: 2021-11-11

## 2021-11-17 ENCOUNTER — HOSPITAL ENCOUNTER (OUTPATIENT)
Dept: LAB | Facility: MEDICAL CENTER | Age: 71
End: 2021-11-17
Attending: NURSE PRACTITIONER
Payer: MEDICARE

## 2021-11-17 LAB
CREAT UR-MCNC: 19.83 MG/DL
MICROALBUMIN UR-MCNC: <1.2 MG/DL
MICROALBUMIN/CREAT UR: NORMAL MG/G (ref 0–30)

## 2021-11-17 PROCEDURE — 82570 ASSAY OF URINE CREATININE: CPT

## 2021-11-17 PROCEDURE — 82043 UR ALBUMIN QUANTITATIVE: CPT

## 2021-11-22 ENCOUNTER — OFFICE VISIT (OUTPATIENT)
Dept: SLEEP MEDICINE | Facility: MEDICAL CENTER | Age: 71
End: 2021-11-22
Payer: MEDICARE

## 2021-11-22 VITALS
BODY MASS INDEX: 33.65 KG/M2 | SYSTOLIC BLOOD PRESSURE: 118 MMHG | HEIGHT: 68 IN | RESPIRATION RATE: 16 BRPM | WEIGHT: 222 LBS | HEART RATE: 72 BPM | OXYGEN SATURATION: 97 % | DIASTOLIC BLOOD PRESSURE: 72 MMHG | TEMPERATURE: 97.8 F

## 2021-11-22 DIAGNOSIS — J44.9 OSA AND COPD OVERLAP SYNDROME (HCC): Primary | ICD-10-CM

## 2021-11-22 DIAGNOSIS — G47.33 OSA AND COPD OVERLAP SYNDROME (HCC): Primary | ICD-10-CM

## 2021-11-22 PROCEDURE — 99203 OFFICE O/P NEW LOW 30 MIN: CPT | Performed by: STUDENT IN AN ORGANIZED HEALTH CARE EDUCATION/TRAINING PROGRAM

## 2021-11-22 ASSESSMENT — FIBROSIS 4 INDEX: FIB4 SCORE: 0.51

## 2021-11-22 ASSESSMENT — PAIN SCALES - GENERAL: PAINLEVEL: NO PAIN

## 2021-11-22 NOTE — PATIENT INSTRUCTIONS
"CPAP and BPAP Information  CPAP and BPAP are methods of helping a person breathe with the use of air pressure. CPAP stands for \"continuous positive airway pressure.\" BPAP stands for \"bi-level positive airway pressure.\" In both methods, air is blown through your nose or mouth and into your air passages to help you breathe well.  CPAP and BPAP use different amounts of pressure to blow air. With CPAP, the amount of pressure stays the same while you breathe in and out. With BPAP, the amount of pressure is increased when you breathe in (inhale) so that you can take larger breaths. Your health care provider will recommend whether CPAP or BPAP would be more helpful for you.  Why are CPAP and BPAP treatments used?  CPAP or BPAP can be helpful if you have:  · Sleep apnea.  · Chronic obstructive pulmonary disease (COPD).  · Heart failure.  · Medical conditions that weaken the muscles of the chest including muscular dystrophy, or neurological diseases such as amyotrophic lateral sclerosis (ALS).  · Other problems that cause breathing to be weak, abnormal, or difficult.  CPAP is most commonly used for obstructive sleep apnea (MICHAELA) to keep the airways from collapsing when the muscles relax during sleep.  How is CPAP or BPAP administered?  Both CPAP and BPAP are provided by a small machine with a flexible plastic tube that attaches to a plastic mask. You wear the mask. Air is blown through the mask into your nose or mouth. The amount of pressure that is used to blow the air can be adjusted on the machine. Your health care provider will determine the pressure setting that should be used based on your individual needs.  When should CPAP or BPAP be used?  In most cases, the mask only needs to be worn during sleep. Generally, the mask needs to be worn throughout the night and during any daytime naps. People with certain medical conditions may also need to wear the mask at other times when they are awake. Follow instructions from your " health care provider about when to use the machine.  What are some tips for using the mask?    · Because the mask needs to be snug, some people feel trapped or closed-in (claustrophobic) when first using the mask. If you feel this way, you may need to get used to the mask. One way to do this is by holding the mask loosely over your nose or mouth and then gradually applying the mask more snugly. You can also gradually increase the amount of time that you use the mask.  · Masks are available in various types and sizes. Some fit over your mouth and nose while others fit over just your nose. If your mask does not fit well, talk with your health care provider about getting a different one.  · If you are using a mask that fits over your nose and you tend to breathe through your mouth, a chin strap may be applied to help keep your mouth closed.  · The CPAP and BPAP machines have alarms that may sound if the mask comes off or develops a leak.  · If you have trouble with the mask, it is very important that you talk with your health care provider about finding a way to make the mask easier to tolerate. Do not stop using the mask. Stopping the use of the mask could have a negative impact on your health.  What are some tips for using the machine?  · Place your CPAP or BPAP machine on a secure table or stand near an electrical outlet.  · Know where the on/off switch is located on the machine.  · Follow instructions from your health care provider about how to set the pressure on your machine and when you should use it.  · Do not eat or drink while the CPAP or BPAP machine is on. Food or fluids could get pushed into your lungs by the pressure of the CPAP or BPAP.  · Do not smoke. Tobacco smoke residue can damage the machine.  · For home use, CPAP and BPAP machines can be rented or purchased through home health care companies. Many different brands of machines are available. Renting a machine before purchasing may help you find out  which particular machine works well for you.  · Keep the CPAP or BPAP machine and attachments clean. Ask your health care provider for specific instructions.  Get help right away if:  · You have redness or open areas around your nose or mouth where the mask fits.  · You have trouble using the CPAP or BPAP machine.  · You cannot tolerate wearing the CPAP or BPAP mask.  · You have pain, discomfort, and bloating in your abdomen.  Summary  · CPAP and BPAP are methods of helping a person breathe with the use of air pressure.  · Both CPAP and BPAP are provided by a small machine with a flexible plastic tube that attaches to a plastic mask.  · If you have trouble with the mask, it is very important that you talk with your health care provider about finding a way to make the mask easier to tolerate.  This information is not intended to replace advice given to you by your health care provider. Make sure you discuss any questions you have with your health care provider.  Document Released: 09/15/2005 Document Revised: 04/08/2020 Document Reviewed: 11/06/2017  Elsevier Patient Education © 2020 [employer]
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
## 2021-11-29 ENCOUNTER — OFFICE VISIT (OUTPATIENT)
Dept: ENDOCRINOLOGY | Facility: MEDICAL CENTER | Age: 71
End: 2021-11-29
Attending: NURSE PRACTITIONER
Payer: MEDICARE

## 2021-11-29 VITALS
DIASTOLIC BLOOD PRESSURE: 64 MMHG | SYSTOLIC BLOOD PRESSURE: 110 MMHG | HEART RATE: 64 BPM | BODY MASS INDEX: 33.65 KG/M2 | WEIGHT: 222 LBS | HEIGHT: 68 IN | OXYGEN SATURATION: 97 %

## 2021-11-29 DIAGNOSIS — E03.9 HYPOTHYROIDISM (ACQUIRED): ICD-10-CM

## 2021-11-29 DIAGNOSIS — E55.9 VITAMIN D DEFICIENCY: ICD-10-CM

## 2021-11-29 DIAGNOSIS — E78.5 DYSLIPIDEMIA: ICD-10-CM

## 2021-11-29 DIAGNOSIS — Z79.4 ENCOUNTER FOR LONG-TERM (CURRENT) USE OF INSULIN (HCC): ICD-10-CM

## 2021-11-29 LAB
HBA1C MFR BLD: 6.1 % (ref 0–5.6)
INT CON NEG: ABNORMAL
INT CON POS: ABNORMAL

## 2021-11-29 PROCEDURE — 83036 HEMOGLOBIN GLYCOSYLATED A1C: CPT | Performed by: NURSE PRACTITIONER

## 2021-11-29 PROCEDURE — 99214 OFFICE O/P EST MOD 30 MIN: CPT | Performed by: NURSE PRACTITIONER

## 2021-11-29 PROCEDURE — 99213 OFFICE O/P EST LOW 20 MIN: CPT | Performed by: NURSE PRACTITIONER

## 2021-11-29 RX ORDER — MULTIVIT WITH MINERALS/LUTEIN
TABLET ORAL
COMMUNITY
End: 2023-02-01

## 2021-11-29 ASSESSMENT — FIBROSIS 4 INDEX: FIB4 SCORE: 0.51

## 2021-11-29 NOTE — PROGRESS NOTES
"RN-CDE Note    Subjective:   Endocrinology Clinic Progress Note  PCP: Blaire Perez M.D.    HPI:  Michelle Espinoza is a 71 y.o. old patient who is seen today for review of Type 1 Diabetes and Hypothyroidism.  Recent changes in health: On oxygen after being in the hospital for sepsis and pneumonia.   DM:   Last A1c:   Lab Results   Component Value Date/Time    HBA1C 6.1 (A) 11/29/2021 11:09 AM      Previous A1c was 6.1 on 7/22/21  A1C GOAL: < 7    Diabetes Medications:   Tresiba 43 units daily  FIASP 8-15 units before meals      Exercise: Up as tolerated  Diet: \"healthy\" diet  in general  Patient's body mass index is 33.75 kg/m². Exercise and nutrition counseling were performed at this visit.    Glucose monitoring frequency: Testing blood sugars 2 to 4 times daily.    Hypoglycemic episodes: yes - after high blood sugars  Last Retinal Exam: She has an appointment tomorrow with [facility]  Daily Foot Exam: Yes   Foot Exam:  Monofilament: done  Monofilament testing with a 10 gram force: sensation intact: intact bilaterally.  Feet numb at the end of the day.  Visual Inspection: Feet without maceration, ulcers, fissures.  Pedal pulses: intact bilaterally   Lab Results   Component Value Date/Time    MALBCRT see below 11/17/2021 03:30 PM    MICROALBUR <1.2 11/17/2021 03:30 PM     She  reports that she has been smoking cigarettes. She has a 40.00 pack-year smoking history. She has never used smokeless tobacco.      Plan:     Discussed and educated on:   - All medications, side effects and compliance (discussed carefully)  - Annual eye examinations at Ophthalmology  - Home glucose monitoring emphasized  - Weight control and daily exercise    Recommended medication changes: She will cut back to 8- 12 units before meals and not over correct.   "

## 2021-11-29 NOTE — PROGRESS NOTES
CHIEF COMPLAINT: Patient is here for follow up of Type 1 Diabetes Mellitus, Hypothyroidism, Dyslipidemia and Vitamin D Deficiency.      HPI:     Michelle Espinoza is a 70 y.o. female with for continued evaluation & treatment of the followin. Type 1 Diabetes Mellitus   Pt states she's doing well since her last appointment with Endocrinology.  Recently hospitalized for sepsis and pneumonia on 2021.  Currently on home oxygen at 5 L.  She is recovering slowly, endurance is improving gradually.  Patient is also concerned as her  has to go in for MVR surgery in the next 4 weeks.    Current Diabetes Regimen:  Tresiba 44 units per day  Fiasp taking between 8- 15 units with meals.    Point-of-care A1c 2021: 6.1%  Serum glycohemoglobin 2021: 6.1%    BG Diary: 2021 testing glucose twice per day.   Average .    .  Patient has had multiple hypoglycemic events as she is quick to dose for postprandial highs and/or over bolusing her meals.  Patient will probably drop into the 40s to 60s.  Patient states she is still hypoglycemic aware.  And has not required any assistance for any hypoglycemic lows.    Weight has decreased 14 pounds since last appointment.  This is secondary to her acute illness in September.    Diabetes Complications   Retinopathy: No known retinopathy.  Last eye exam: Patient reports she has not had an appointment tomorrow.  2020-Dr. Manuel.   Neuropathy: Denies paresthesias or numbness in hands or feet. Denies any foot wounds.  Exercise: Minimal.  Diet: Fair.      BP currently 110/64.  No current ACE or ARB therapy.     Ref. Range 2020 06:56   Creatinine, Urine Latest Units: mg/dL 58.73   Microalbumin, Urine Random Latest Units: mg/dL <1.2       2. Hypothyroidism   Currently taking Levothyroxine 112mcg QD.  This is been her same dosage for 12 months.  Pt takes thyroid hormone 1 hour before breakfast, adequate compliance.  Denies taking calcium, iron and antacid  supplements.   Patient denies mental fogginess and constipation. Patient denies palpitations and increased anxiousness.  Patient also denies dysphagia, shortness of breath and anterior neck tenderness.       Ref. Range 7/22/2021 07:20   TSH Latest Ref Range: 0.380 - 5.330 uIU/mL 1.300   Free T-4 Latest Ref Range: 0.93 - 1.70 ng/dL 1.28       3. Dyslipidemia  Currently taking Lovastatin 20mg QD.  Patient has tolerated statin therapy well for over 2 years.  Denies muscle fatigue and weakness.      Ref. Range 7/22/2021 07:20   Cholesterol,Tot Latest Ref Range: 100 - 199 mg/dL 192   Triglycerides Latest Ref Range: 0 - 149 mg/dL 102   HDL Latest Ref Range: >=40 mg/dL 91   LDL Latest Ref Range: <100 mg/dL 81         4. Vitamin D Deficiency  Currently taking Vitamin D 2000IU QD.      Ref. Range 2/17/2021 07:46   25-Hydroxy   Vitamin D 25 Latest Ref Range: 30 - 100 ng/mL 71       Patient's medications, allergies, and social histories were reviewed and updated as appropriate.    ROS:     CONS:     No fever, no chills   EYES:     No diplopia, no blurry vision   CV:           No chest pain, no palpitations   PULM:     No SOB, no cough, no hemoptysis.   GI:            No nausea, no vomiting, no diarrhea, no constipation   ENDO:     No polyuria, no polydipsia, no heat intolerance, no cold intolerance       Past Medical History:  Problem List:  2021-09: MICHAELA (obstructive sleep apnea)  2021-09: Bacteremia  2021-08: Sepsis ([facility])  2021-08: Thrombocytopenia ([facility])  2016-12: Influenza A  2016-12: COPD (chronic obstructive pulmonary disease) ([facility])  2016-12: Osteoporosis  2016-12: Pneumonia  2016-12: Acute on chronic respiratory failure with hypoxia ([facility])  2016-10: Hypotension  2016-10: Dehydration  2016-10: Tobacco abuse  2014-11: Insulin pump status  2014-08: Type 1 diabetes mellitus with neurological manifestations,   uncontrolled ([facility])  2014-08: Diabetic polyneuropathy ([facility])  2014-04: Disorder of bone and cartilage  2014-04: Fitting  "and adjustment of insulin pump  2014-04: Encounter for long-term (current) use of insulin ([facility])  2014-02: Low back pain  2013-09: Hypothyroid  2013-09: Obesity  2013-09: Postoperative anemia due to acute blood loss  2013-09: Hip pain  2013-08: Hypertension  2013-08: Dyslipidemia  2013-08: Vitamin D deficiency  2013-08: Uncontrolled type 1 diabetes mellitus (HCC)      Past Surgical History:  Past Surgical History:   Procedure Laterality Date   • HIP ARTHROPLASTY TOTAL  9/9/2013    Performed by Pedro Warren M.D. at SURGERY [facility] ORS   • BREAST RECONSTRUCTION  2001   • MASTECTOMY  1992    right, full   • MASTECTOMY  1987, 2001    partial, left x2        Allergies:  Bactrim [sulfamethoxazole w-trimethoprim] and Percocet [oxycodone-acetaminophen]     Social History:  Social History     Tobacco Use   • Smoking status: Current Every Day Smoker     Packs/day: 1.00     Years: 40.00     Pack years: 40.00     Types: Cigarettes   • Smokeless tobacco: Never Used   Vaping Use   • Vaping Use: Never used   Substance Use Topics   • Alcohol use: Yes     Comment: 2 per day   • Drug use: No        Family History:   family history includes Lung Disease in an other family member.      PHYSICAL EXAM:   OBJECTIVE:  Vital signs: /64   Pulse 64   Ht 1.727 m (5' 8\")   Wt 101 kg (222 lb)   SpO2 97%   BMI 33.75 kg/m²   GENERAL: Well-developed, well-nourished in no apparent distress.   EYE:  No ocular asymmetry, PERRLA  HENT: Pink, moist mucous membranes.    NECK: No thyromegaly.   CARDIOVASCULAR:  No murmurs  LUNGS: Clear breath sounds  ABDOMEN: Soft, nontender   EXTREMITIES: No clubbing, cyanosis, or edema.   NEUROLOGICAL: No gross focal motor abnormalities   LYMPH: No cervical adenopathy palpated.   SKIN: No rashes, lesions.       ASSESSMENT/PLAN:     1. Uncontrolled type 1 diabetes mellitus with hyperglycemia (HCC)  Unstable secondary to significant amount of hypoglycemic events.  Current Diabetes Regimen:  Tresiba 44 " units per day  Reduce Fiasp  to 8-12 units with meals    Patient has been approved for drug therapy assistance through the pharmaceutical companies.  The insulin will be shipped to our office and the patient will be notified when the medications are available.    Continue balanced meal planning, my plate method.   Recommend 2L-3L of water daily.   Recommend exercise 150 minutes per week.   Daily foot inspection.   Dilated EYE Exam Due in 12/2021 and patient has an appointment tomorrow.    2. Acquired hypothyroidism  Stable.  Recommend increasing Levothyroxine 112mcg QD.     3. Dyslipidemia  Stable  Continue Lovastatin 20mg QD.     4. Vitamin D deficiency  Stable  Continue Vitamin D 2000IU QD.     5.  Encounter for long-term use of insulin  Unstable.  Plan as per #1.    Repeat labs 1 week prior to appointment.   Next appointment in 4-5 months.      Thank you kindly for allowing me to participate in the diabetes care plan for this patient.    Rossy Lowry, APRN  11/29/2021      CC:   Blaire Perez M.D.

## 2021-11-30 RX ORDER — ALBUTEROL SULFATE 2.5 MG/3ML
SOLUTION RESPIRATORY (INHALATION)
COMMUNITY
Start: 2021-11-23 | End: 2024-01-10

## 2021-12-01 ENCOUNTER — TELEPHONE (OUTPATIENT)
Dept: RESPIRATORY THERAPY | Facility: MEDICAL CENTER | Age: 71
End: 2021-12-01

## 2021-12-01 NOTE — TELEPHONE ENCOUNTER
COPD program follow up phone call:    Did you stop smoking because of the program? No  Not smoking at time of follow up? No  Did you refill your medications? Yes  Did you take them everyday as prescribed? Yes  Have you seen [facility] since discharging from the hospital? N/A  Have you seen Home Health since discharging from the hospital? N/A  Have you seen [facility] since discharging from the hospital? N/A  Have you seen [facility] since discharging from the hospital? N/A

## 2021-12-13 ENCOUNTER — SLEEP STUDY (OUTPATIENT)
Dept: SLEEP MEDICINE | Facility: MEDICAL CENTER | Age: 71
End: 2021-12-13
Attending: STUDENT IN AN ORGANIZED HEALTH CARE EDUCATION/TRAINING PROGRAM
Payer: MEDICARE

## 2021-12-13 DIAGNOSIS — J44.9 OSA AND COPD OVERLAP SYNDROME (HCC): ICD-10-CM

## 2021-12-13 DIAGNOSIS — G47.33 OSA AND COPD OVERLAP SYNDROME (HCC): ICD-10-CM

## 2021-12-13 PROCEDURE — 95810 POLYSOM 6/> YRS 4/> PARAM: CPT | Performed by: STUDENT IN AN ORGANIZED HEALTH CARE EDUCATION/TRAINING PROGRAM

## 2021-12-15 NOTE — PROCEDURES
MONTAGE: Standard  STUDY TYPE: Diagnostic    RECORDING TECHNIQUE:   After the scalp was prepared, gold plated electrodes were applied to the scalp according to the International 10-20 System. EEG (electroencephalogram) was continuously monitored from the O1-M2, O2-M1, C3-M2, C4-M1, F3-M2, and F4-M1. EOGs (electrooculograms) were monitored by electrodes placed at the left and right outer canthi. Chin EMG (electromyogram) was monitored by electrodes placed on the mentalis and sub-mentalis muscles. Nasal and oral airflow were monitored using a triple port thermocouple as well as oronasal pressure transducer. Respiratory effort was measured by inductive plethysmography technology employing abdominal and thoracic belts. Blood oxygen saturation and pulse were monitored by pulse oximetry. Heart rhythm was monitored by surface electrocardiogram. Leg EMG was studied using surface electrodes placed on left and right anterior tibialis. A microphone was used to monitor tracheal sounds and snoring. Body position was monitored and documented by technician observation.     SCORING CRITERIA:   A modification of the AASM manual for scoring of sleep and associated events was used. Obstructive apneas were scored by cessation of airflow for at least 10 seconds with continuing respiratory effort. Central apneas were scored by cessation of airflow for at least 10 seconds with no respiratory effort. Hypopneas were scored by a 30% or more reduction in airflow for at least 10 seconds accompanied by arterial oxygen desaturation of 3% or an arousal. For CMS (Medicare) patients, per AASM rule 1B, hypopneas are scored by 30% with mild reduction in airflow for at least 10 seconds accompanied by arterial saturation decreased at 4%.    Study start time was 09:17:06 PM. Diagnostic recording time was 8h 25.5m with a total sleep time of 6h 55.5m resulting in a sleep efficiency of 82.20%%. Sleep latency from the start of the study was 23 minutes and  the latency from sleep to REM was 289 minutes. In total,335 arousals were scored for an arousal index of 48.4.    Patient has known COPD and is on supplemental oxygen 24 hours a day.  First 166 minutes of study were done without supplemental oxygen and remaining 339 minutes of the study were done with supplemental oxygen.    Respiratory:  There were a total of 2 apneas consisting of 2 obstructive apneas, 0 mixed apneas, and 0 central apneas. A total of 83 hypopneas were scored. The apnea index was 0.29 per hour and the hypopnea index was 11.99 per hour resulting in an overall AHI of 12.27. AHI during rem was 4.2 and AHI while supine was 16.98.  Oximetry:  There was a mean oxygen saturation of 93.0%. The minimum oxygen saturation in NREM was 79.0 % and in REM was 95.0%. The patient spent 110.5 minutes of TST with SaO2 <88%.  Cardiac:  The highest heart rate seen while awake was 97 BPM while the highest heart rate during sleep was 84 BPM with an average sleeping heart rate of 62 BPM.  Limb Movements:  There were a total of 398 PLMs during sleep which resulted in a PLMS index of 57.5. Of these, 229 were associated with arousals which resulted in a PLMS arousal index of 33.1.    Impression:  1.  Mild obstructive sleep apnea overall AHI 12.27  2.  Nocturnal hypoxia minimum O2 sat 79% time below 88% saturation of 110 minutes  3.  Periodic limb movements elevated    Recommendations:  I recommend that the patient should return for a CPAP/BiPAP titration due to the severity of sleep disordered breathing  and sleep-related hypoxia.    Patient currently is on bilevel PAP therapy at home 15/11 cm.  To requalify for BiPAP therapy will need PSG titration study.     In some cases alternative treatment options may be proven effective in resolving sleep apnea. These options include upper airway surgery, the use of a dental orthotic, weight loss orpositional therapy. Clinical correlation is required. In general patients with sleep  apnea are advised to avoid alcohol, sedatives and not to operate a motor vehicle while drowsy.  Untreated sleep apnea increases the risk for cardiovascular and neurovascular disease.

## 2021-12-21 ENCOUNTER — TELEPHONE (OUTPATIENT)
Dept: SLEEP MEDICINE | Facility: MEDICAL CENTER | Age: 71
End: 2021-12-21

## 2021-12-22 NOTE — TELEPHONE ENCOUNTER
----- Message from Matias Talbot M.D. sent at 12/20/2021 12:29 PM PST -----  Can you please schedule for Titration study. Thank you  
Called pt and schedule SS titration that was order per Dr. Talbot on 1/8/2022 @7:35 pm. The pt also stated to me that she would like a sleep aid for the night of her SS, please advise.   
[phone]

## 2021-12-28 ENCOUNTER — APPOINTMENT (OUTPATIENT)
Dept: SLEEP MEDICINE | Facility: MEDICAL CENTER | Age: 71
End: 2021-12-28
Payer: MEDICARE

## 2022-01-03 ENCOUNTER — TELEPHONE (OUTPATIENT)
Dept: SLEEP MEDICINE | Facility: MEDICAL CENTER | Age: 72
End: 2022-01-03

## 2022-01-03 DIAGNOSIS — G47.00 ACUTE INSOMNIA: ICD-10-CM

## 2022-01-03 RX ORDER — ZOLPIDEM TARTRATE 5 MG/1
5 TABLET ORAL NIGHTLY PRN
Qty: 2 TABLET | Refills: 0 | Status: SHIPPED | OUTPATIENT
Start: 2022-01-03 | End: 2022-01-04

## 2022-01-03 RX ORDER — ZOLPIDEM TARTRATE 5 MG/1
5 TABLET ORAL NIGHTLY PRN
Qty: 2 TABLET | Refills: 0 | Status: SHIPPED | OUTPATIENT
Start: 2022-01-03 | End: 2022-01-03

## 2022-01-08 ENCOUNTER — SLEEP STUDY (OUTPATIENT)
Dept: SLEEP MEDICINE | Facility: MEDICAL CENTER | Age: 72
End: 2022-01-08
Attending: STUDENT IN AN ORGANIZED HEALTH CARE EDUCATION/TRAINING PROGRAM
Payer: MEDICARE

## 2022-01-08 DIAGNOSIS — J44.9 OSA AND COPD OVERLAP SYNDROME (HCC): ICD-10-CM

## 2022-01-08 DIAGNOSIS — G47.33 OSA AND COPD OVERLAP SYNDROME (HCC): ICD-10-CM

## 2022-01-08 PROCEDURE — 95811 POLYSOM 6/>YRS CPAP 4/> PARM: CPT | Performed by: STUDENT IN AN ORGANIZED HEALTH CARE EDUCATION/TRAINING PROGRAM

## 2022-01-10 NOTE — PROCEDURES
ID: [id number] Date: 1/8/2022 Exam No.:   MONTAGE: Standard    STUDY TYPE: Treatment    RECORDING TECHNIQUE:   After the scalp was prepared, gold plated electrodes were applied to the scalp according to the International 10-20 System. EEG (electroencephalogram) was continuously monitored from the O1-M2, O2-M1, C3-M2, C4-M1, F3-M2, and F4-M1. EOGs (electrooculograms) were monitored by electrodes placed at the left and right outer canthi. Chin EMG (electromyogram) was monitored by electrodes placed on the mentalis and sub-mentalis muscles. Nasal and oral airflow were monitored using a triple port thermocouple as well as oronasal pressure transducer. Respiratory effort was measured by inductive plethysmography technology employing abdominal and thoracic belts. Blood oxygen saturation and pulse were monitored by pulse oximetry. Heart rhythm was monitored by surface electrocardiogram. Leg EMG was studied using surface electrodes placed on left and right anterior tibialis. A microphone was used to monitor tracheal sounds and snoring. Body position was monitored and documented by technician observation.     SCORING CRITERIA:   A modification of the AASM manual for scoring of sleep and associated events was used. Obstructive apneas were scored by cessation of airflow for at least 10 seconds with continuing respiratory effort. Central apneas were scored by cessation of airflow for at least 10 seconds with no respiratory effort. Hypopneas were scored by a 30% or more reduction in airflow for at least 10 seconds accompanied by arterial oxygen desaturation of 3% or an arousal. For CMS (Medicare) patients, per AASM rule 1B, hypopneas are scored by 30% with mild reduction in airflow for at least 10 seconds accompanied by arterial saturation decreased at 4%.    Study start time was 09:17:32 PM. Diagnostic recording time was 9h 14.5m with a total sleep time of 8h 8.5m resulting in a sleep efficiency of 88.10%%. Sleep latency from  the start of the study was 28 minutes and the latency from sleep to REM was 130 minutes. In total,211 arousals were scored for an arousal index of 25.9.    Respiratory:  There were a total of 1 apneas consisting of 1 obstructive apneas, 0 mixed apneas, and 0 central apneas. A total of 3 hypopneas were scored. The apnea index was 0.12 per hour and the hypopnea index was 0.37 per hour resulting in an overall AHI of 0.49. AHI during rem was 0.0 and AHI while supine was 0.47.  Oximetry:  There was a mean oxygen saturation of 92.0%. The minimum oxygen saturation in NREM was 85.0 % and in REM was 85.0%. The patient spent 107.0 minutes of TST with SaO2 <88%.  Cardiac:  The highest heart rate seen while awake was 86 BPM while the highest heart rate during sleep was 78 BPM with an average sleeping heart rate of 62 BPM.  Limb Movements:  There were a total of 450 PLMs during sleep which resulted in a PLMS index of 55.3. Of these, 142 were associated with arousals which resulted in a PLMS arousal index of 17.4.  Titration: CPAP was tried from 5cm H2O to7cm H2O. Bipap was tried from 11/7 to 12/8cm H2O. Oxygen was added from 1-2LPM.  This was a fully attended sleep study. This test was technically adequate. This patient was titrated on CPAP starting at 5 cm of water pressure. Patient was titrated up to BiPAP 12/8 cm of water pressure. Patient did best at BiPAP 11/7 cm of water pressure. Patient spent 174 minutes at that pressure and their AHI was 0.7 which is considered treated obstructive sleep apnea.     Impression:  1. improvement with obstructive sleep apnea events on PAP therapy  2. patient did best with BiPAP EPAP 7 IPAP 11  3. nocturnal hypoxia despite adequate control of sleep apnea events minimum oxygen saturation 85% time below 88% saturation of 107 minutes, supplemental oxygen was needed during study at 2 L/min  4. PLM's noted    Recommendations:  I recommend BiPAP of 11/7 cm with bleeding 2 L/min supplemental oxygen.      I also recommend 30 day compliance download to assess the efficacy to the recommended pressure, measure leak, apnea hypopnea index and compliance for further outpatient monitoring and management of CPAP therapy. In some cases alternative treatment options may be proven effective in resolving sleep apnea. These options include upper airway surgery, the use of a dental orthotic, weight loss orpositional therapy. Clinical correlation is required. In general patients with sleep apnea are advised to avoid alcohol, sedatives and not to operate a motor vehicle while drowsy.  Untreated sleep apnea increases the risk for cardiovascular and neurovascular disease.

## 2022-01-12 ENCOUNTER — TELEPHONE (OUTPATIENT)
Dept: SLEEP MEDICINE | Facility: MEDICAL CENTER | Age: 72
End: 2022-01-12

## 2022-01-12 DIAGNOSIS — J44.9 OSA AND COPD OVERLAP SYNDROME (HCC): Primary | ICD-10-CM

## 2022-01-12 DIAGNOSIS — G47.33 OSA AND COPD OVERLAP SYNDROME (HCC): Primary | ICD-10-CM

## 2022-01-14 NOTE — TELEPHONE ENCOUNTER
called pt to inform her that Dr. Talbot place a order for a new bipap machine and  i informed the pt that i will be faxing the order to [facility] with all the needed information. i informed the pt that [facility] will be contacting her regarding the order in a few weeks but if she does not hear from them i advise her to give them a call right away and pt is already schedule for a [facility] apt on 4/18/2022 @11:00 am with .

## 2022-03-07 ENCOUNTER — PATIENT MESSAGE (OUTPATIENT)
Dept: HEALTH INFORMATION MANAGEMENT | Facility: OTHER | Age: 72
End: 2022-03-07
Payer: MEDICARE

## 2022-03-09 ENCOUNTER — HOSPITAL ENCOUNTER (OUTPATIENT)
Dept: LAB | Facility: MEDICAL CENTER | Age: 72
End: 2022-03-09
Attending: NURSE PRACTITIONER
Payer: MEDICARE

## 2022-03-09 LAB
25(OH)D3 SERPL-MCNC: 62 NG/ML (ref 30–100)
T3FREE SERPL-MCNC: 2.56 PG/ML (ref 2–4.4)
T4 FREE SERPL-MCNC: 1.15 NG/DL (ref 0.93–1.7)
TSH SERPL DL<=0.005 MIU/L-ACNC: 1.19 UIU/ML (ref 0.38–5.33)

## 2022-03-09 PROCEDURE — 84443 ASSAY THYROID STIM HORMONE: CPT

## 2022-03-09 PROCEDURE — 82306 VITAMIN D 25 HYDROXY: CPT

## 2022-03-09 PROCEDURE — 36415 COLL VENOUS BLD VENIPUNCTURE: CPT

## 2022-03-09 PROCEDURE — 84439 ASSAY OF FREE THYROXINE: CPT

## 2022-03-09 PROCEDURE — 84481 FREE ASSAY (FT-3): CPT

## 2022-04-18 ENCOUNTER — OFFICE VISIT (OUTPATIENT)
Dept: SLEEP MEDICINE | Facility: MEDICAL CENTER | Age: 72
End: 2022-04-18
Payer: MEDICARE

## 2022-04-18 VITALS
RESPIRATION RATE: 12 BRPM | BODY MASS INDEX: 34.86 KG/M2 | WEIGHT: 230 LBS | OXYGEN SATURATION: 96 % | HEART RATE: 85 BPM | SYSTOLIC BLOOD PRESSURE: 124 MMHG | DIASTOLIC BLOOD PRESSURE: 72 MMHG | HEIGHT: 68 IN

## 2022-04-18 DIAGNOSIS — G47.33 OSA AND COPD OVERLAP SYNDROME (HCC): Primary | ICD-10-CM

## 2022-04-18 DIAGNOSIS — J44.9 OSA AND COPD OVERLAP SYNDROME (HCC): Primary | ICD-10-CM

## 2022-04-18 PROCEDURE — 99213 OFFICE O/P EST LOW 20 MIN: CPT | Performed by: STUDENT IN AN ORGANIZED HEALTH CARE EDUCATION/TRAINING PROGRAM

## 2022-04-18 ASSESSMENT — FIBROSIS 4 INDEX: FIB4 SCORE: 0.51

## 2022-04-18 ASSESSMENT — PATIENT HEALTH QUESTIONNAIRE - PHQ9: CLINICAL INTERPRETATION OF PHQ2 SCORE: 0

## 2022-04-18 NOTE — PROGRESS NOTES
[facility] Follow-up Visit    CC: Follow-up for first compliance since receiving a BiPAP machine      HPI:  Michelle Espinoza is a 71 y.o.female  with hypothyroidism, type 1 diabetes mellitus, COPD on 3 to 5 L supplemental oxygen, diabetic neuropathy, and obstructive sleep apnea on BiPAP with supplemental oxygen 4 L/min.  Presents to sleep clinic for follow-up for MICHAELA management after receiving a BiPAP machine.      States she has had a machine for just over a month.  Overall she is very happy with her new machine as she finds it is quiet and seems to work well.  States she is sleeping well on the machine and finds that her sleep is restorative.  She is calmly sleeping 10 hours a night.  She uses her machine nightly.  Overall finds the mask and pressures comfortable.     she is following with pulmonology and does have an appointment next month with Dr. Majano.  States previously there was discussion with her pulmonologist regarding trilogy inhaler.  She is currently on BrezTri and states she does not have enough to get to her next appointment.  Reports interest in continuing current medication until next appointment and would like a sample at today's visit.    DME provider: [facility]  Device: Aircurve 10   Mask: nasal   Aerophagia: No   Snoring: No   Dry mouth: No   Leak: No   Skin irritation: No   Chin strap: No       Sleep History  12/13/2021 PSG showed mild obstructive sleep apnea overall 4% AHI 12.27, minimum oxygen saturation 79%.  1/8/2022 PSG titration study showed improvement with PAP therapy and she did best with BiPAP EPAP 7 IPAP 11, there was persistent nocturnal hypoxia with Pap therapy and was recommended she continue supplemental oxygen with Pap therapy    Patient Active Problem List    Diagnosis Date Noted   • MICHAELA (obstructive sleep apnea) 09/01/2021   • Bacteremia 09/01/2021   • Sepsis (HCC) 08/31/2021   • Thrombocytopenia (HCC) 08/31/2021   • Influenza A 12/27/2016   • COPD (chronic  obstructive pulmonary disease) ([facility]) 12/27/2016   • Osteoporosis 12/27/2016   • Pneumonia 12/26/2016   • Acute on chronic respiratory failure with hypoxia ([facility]) 12/26/2016   • Tobacco abuse 10/20/2016   • Insulin pump status 11/17/2014   • Type 1 diabetes mellitus with neurological manifestations, uncontrolled ([facility]) 08/22/2014   • Diabetic polyneuropathy ([facility]) 08/22/2014   • Disorder of bone and cartilage 04/23/2014   • Fitting and adjustment of insulin pump 04/23/2014   • Encounter for long-term (current) use of insulin ([facility]) 04/23/2014   • Low back pain 02/06/2014   • Hypothyroid 09/10/2013   • Obesity 09/10/2013   • Postoperative anemia due to acute blood loss 09/10/2013   • Hip pain 09/09/2013   • Dyslipidemia 08/22/2013   • Vitamin D deficiency 08/22/2013   • Uncontrolled type 1 diabetes mellitus ([facility]) 08/22/2013       Past Medical History:   Diagnosis Date   • Anesthesia     PONV   • Arthritis    • ASTHMA    • Cancer ([facility])     history of breast cancer   • Chickenpox    • Dental disorder     dentures, full upper and lower   • Diabetes     insulin pump   • Diabetic neuropathy ([facility])    • EMPHYSEMA    • Encounter for long-term (current) use of insulin ([facility]) 4/23/2014   • Epilepsy ([facility])    • Fitting and adjustment of insulin pump 4/23/2014   • Heart burn    • Mumps    • osteopenia 4/23/2014   • Pain     hip   • Psychiatric problem     depression   • Sleep apnea     CPAP   • Unspecified disorder of thyroid     hypothyroid        Past Surgical History:   Procedure Laterality Date   • HIP ARTHROPLASTY TOTAL  9/9/2013    Performed by Pedro Warren M.D. at [facility]   • BREAST RECONSTRUCTION  2001   • MASTECTOMY  1992    right, full   • MASTECTOMY  1987, 2001    partial, left x2       Family History   Problem Relation Age of Onset   • Lung Disease Other        Social History     Socioeconomic History   • Marital status:      Spouse name: Not on file   • Number of children: Not on file   • Years  of education: Not on file   • Highest education level: Not on file   Occupational History   • Not on file   Tobacco Use   • Smoking status: Current Every Day Smoker     Packs/day: 1.00     Years: 40.00     Pack years: 40.00     Types: Cigarettes   • Smokeless tobacco: Never Used   Vaping Use   • Vaping Use: Never used   Substance and Sexual Activity   • Alcohol use: Yes     Comment: 2 per day   • Drug use: No   • Sexual activity: Not on file   Other Topics Concern   • Not on file   Social History Narrative   • Not on file     Social Determinants of Health     Financial Resource Strain: Not on file   Food Insecurity: Not on file   Transportation Needs: Not on file   Physical Activity: Not on file   Stress: Not on file   Social Connections: Not on file   Intimate Partner Violence: Not on file   Housing Stability: Not on file       Current Outpatient Medications   Medication Sig Dispense Refill   • Budeson-Glycopyrrol-Formoterol (BREZTRI AEROSPHERE) 160-9-4.8 MCG/ACT Aerosol Inhale 2 Puffs 2 times a day for 30 days. 10.7 g 0   • albuterol (PROVENTIL) 2.5mg/3ml Nebu Soln solution for nebulization      • Ascorbic Acid (VITAMIN C) 1000 MG Tab Take  by mouth.     • Multiple Vitamins-Minerals (CENTRUM SILVER 50+WOMEN) Tab Take 1 Tablet by mouth every day.     • albuterol 108 (90 Base) MCG/ACT Aero Soln inhalation aerosol Inhale 1-2 Puffs every four hours as needed for Shortness of Breath.     • Insulin Degludec (TRESIBA FLEXTOUCH) 100 UNIT/ML Solution Pen-injector Inject 44 Units under the skin every day.     • levothyroxine (SYNTHROID) 112 MCG Tab Take 1 tablet by mouth Every morning on an empty stomach. 90 tablet 4   • esomeprazole (NEXIUM) 20 MG capsule Take 20 mg by mouth 2 times a day.     • montelukast (SINGULAIR) 10 MG Tab Take 10 mg by mouth every day.     • Insulin Aspart, w/Niacinamide, 100 UNIT/ML Solution Inject 8-15 Units under the skin 3 times a day before meals. 1 unit per 5 grams of carbs  Pt takes with  "snacks as well as 3 times a day     • Cyanocobalamin (VITAMIN B 12 PO) Take 1 Tablet by mouth every day.     • Cholecalciferol (VITAMIN D) 2000 UNIT Tab Take 4,000 Units by mouth every day.     • captopril (CAPOTEN) 12.5 MG Tab Take 12.5 mg by mouth every day.     • ipratropium-albuterol (DUONEB) 0.5-2.5 (3) MG/3ML nebulizer solution 3 mL by Nebulization route every 6 hours as needed for Shortness of Breath. 25 Bullet 0   • cetirizine (ZYRTEC) 10 MG Tab Take 10 mg by mouth 2 times a day.     • B Complex Cap Take 1 Cap by mouth every day.     • fluoxetine (PROZAC) 20 MG CAPS Take 20 mg by mouth every day.     • raloxifene (EVISTA) 60 MG Tab Take 60 mg by mouth every day.     • aspirin EC (ECOTRIN) 81 MG TBEC Take 81 mg by mouth every day.     • lovastatin (MEVACOR) 20 MG TABS Take 20 mg by mouth every evening.     • beclomethasone (QVAR) 80 MCG/ACT inhaler Inhale 2 Puffs by mouth 2 times a day. Indications: Chronic Bronchitis with Asthma     • tiotropium (SPIRIVA) 18 MCG Cap Inhale 18 mcg by mouth every day. (Patient not taking: Reported on 4/18/2022)       No current facility-administered medications for this visit.        ALLERGIES: Bactrim [sulfamethoxazole w-trimethoprim] and Percocet [oxycodone-acetaminophen]    ROS  Constitutional: Denies fevers, Denies weight changes  Ears/Nose/Throat/Mouth: Denies nasal congestion or sore throat   Cardiovascular: Denies chest pain  Respiratory: Denies shortness of breath, Denies cough  Gastrointestinal/Hepatic: Denies nausea, vomiting  Sleep: see HPI      PHYSICAL EXAM  /72 (BP Location: Left arm, Patient Position: Sitting, BP Cuff Size: Adult)   Pulse 85   Resp 12   Ht 1.727 m (5' 8\")   Wt 104 kg (230 lb)   SpO2 96% Comment: 4L 24/7  BMI 34.97 kg/m²   Appearance: Well-nourished, well-developed, no acute distress  Eyes:  No scleral icterus , EOMI  ENMT: masked  Musculoskeletal:  Grossly normal; gait and station normal; digits and nails normal  Skin:  No rashes, " petechiae, cyanosis  Neurologic: without focal signs; oriented to person, time, place, and purpose; judgement intact      Medical Decision Making   Assessment and Plan  Michelle Espinoza is a 71 y.o.female  with hypothyroidism, type 1 diabetes mellitus, COPD on 3 to 5 L supplemental oxygen, diabetic neuropathy, and obstructive sleep apnea on BiPAP with supplemental oxygen 4 L/min.  Presents to sleep clinic for follow-up for MICHAELA management after receiving a BiPAP machine.        The medical record was reviewed.    Obstructive sleep apnea and COPD overlap syndrome  Compliance data reviewed showing 100% usage > 4hours in last 30  days. Average AHI 0.2 events/hour. Pt continues to use and benefit from machine.    Current settings BiPAP 11/7 with 4 L supplemental oxygen.    She continues to report improvement with PAP therapy.  She plans to see pulmonology in 1 month.  She is currently low on inhalers.  Renewed sample prescription at today's visit for BrezTri    PLAN:   -Order placed for mask and supplies   -Sample ordered in office for BrezTri  -Advised to reach out via MyChart with questions     Has been advised to continue the current  BiPAP 11/7 with 4 L supplemental oxygen, clean equipment frequently, and get new mask and supplies as allowed by insurance and DME. Recommend an earlier appointment, if significant treatment barriers develop.    The risks of untreated sleep apnea were discussed with the patient at length. Patients with MICHAELA are at increased risk of cardiovascular disease including coronary artery disease, systemic arterial hypertension, pulmonary arterial hypertension, cardiac arrythmias, and stroke. The patient was advised to avoid driving a motor vehicle when drowsy.    Positive airway pressure will favorably impact many of the adverse conditions and effects provoked by MICHAELA.    Have advised the patient to follow up with the appropriate healthcare practitioners for all other medical problems and  issues.    Return in about 1 year (around 4/18/2023).      Please note portions of this record was created using voice recognition software. I have made every reasonable attempt to correct obvious errors, but I expect that there are errors of grammar and possibly content I did not discover before finalizing the note.

## 2022-04-19 ENCOUNTER — TELEPHONE (OUTPATIENT)
Dept: RESPIRATORY THERAPY | Facility: MEDICAL CENTER | Age: 72
End: 2022-04-19
Payer: MEDICARE

## 2022-04-19 NOTE — TELEPHONE ENCOUNTER
COPD program follow up phone call:    Did you stop smoking because of the program? No  Not smoking at time of follow up? No  Did you refill your medications? Yes  Did you take them everyday as prescribed? Yes  Have you seen [facility] since discharging from the hospital? n/a  Have you seen Home Health since discharging from the hospital? n/a  Have you seen [facility] since discharging from the hospital? n/a  Have you seen [facility] since [facility] from the hospital? N/a  Has an appointment with pulmonology next month.

## 2022-05-04 DIAGNOSIS — E03.9 ACQUIRED HYPOTHYROIDISM: ICD-10-CM

## 2022-05-04 RX ORDER — LEVOTHYROXINE SODIUM 112 UG/1
TABLET ORAL
Qty: 90 TABLET | Refills: 0 | Status: SHIPPED | OUTPATIENT
Start: 2022-05-04 | End: 2022-08-16 | Stop reason: SDUPTHER

## 2022-05-17 ENCOUNTER — TELEPHONE (OUTPATIENT)
Dept: HEALTH INFORMATION MANAGEMENT | Facility: OTHER | Age: 72
End: 2022-05-17
Payer: MEDICARE

## 2022-05-20 ENCOUNTER — OFFICE VISIT (OUTPATIENT)
Dept: ENDOCRINOLOGY | Facility: MEDICAL CENTER | Age: 72
End: 2022-05-20
Attending: NURSE PRACTITIONER
Payer: MEDICARE

## 2022-05-20 VITALS
BODY MASS INDEX: 34.25 KG/M2 | SYSTOLIC BLOOD PRESSURE: 126 MMHG | HEIGHT: 68 IN | HEART RATE: 69 BPM | OXYGEN SATURATION: 100 % | WEIGHT: 226 LBS | DIASTOLIC BLOOD PRESSURE: 72 MMHG

## 2022-05-20 DIAGNOSIS — E78.5 DYSLIPIDEMIA: ICD-10-CM

## 2022-05-20 DIAGNOSIS — E03.9 HYPOTHYROIDISM (ACQUIRED): ICD-10-CM

## 2022-05-20 DIAGNOSIS — E55.9 VITAMIN D DEFICIENCY: ICD-10-CM

## 2022-05-20 DIAGNOSIS — Z79.4 ENCOUNTER FOR LONG-TERM (CURRENT) USE OF INSULIN (HCC): ICD-10-CM

## 2022-05-20 LAB
HBA1C MFR BLD: 7.3 % (ref 0–5.6)
INT CON NEG: NEGATIVE
INT CON POS: POSITIVE

## 2022-05-20 PROCEDURE — 83036 HEMOGLOBIN GLYCOSYLATED A1C: CPT | Performed by: NURSE PRACTITIONER

## 2022-05-20 PROCEDURE — 99214 OFFICE O/P EST MOD 30 MIN: CPT | Performed by: NURSE PRACTITIONER

## 2022-05-20 PROCEDURE — 99212 OFFICE O/P EST SF 10 MIN: CPT | Performed by: NURSE PRACTITIONER

## 2022-05-20 RX ORDER — CELECOXIB 200 MG/1
200 CAPSULE ORAL DAILY
COMMUNITY
Start: 2022-05-06

## 2022-05-20 RX ORDER — METHYLPREDNISOLONE 4 MG/1
TABLET ORAL
COMMUNITY
Start: 2022-05-06 | End: 2022-05-23

## 2022-05-20 ASSESSMENT — FIBROSIS 4 INDEX: FIB4 SCORE: 0.51

## 2022-05-20 NOTE — PROGRESS NOTES
CHIEF COMPLAINT: Patient is here for follow up of Type 1 Diabetes Mellitus, Hypothyroidism, Dyslipidemia and Vitamin D Deficiency.      HPI:     Michelle Espinoza is a 71 y.o. female with for continued evaluation & treatment of the following:     Type 1 Diabetes Mellitus   Pt reports she's getting over a stomach virus. Recently her Granddaughter and her family had to move in with her. That has increased her stress from day to day.   Continues on home oxygen at 5 L, end stage COPD.       Current Diabetes Regimen:  Tresiba 44 units per day  Fiasp taking between 8- 12 units with meals. CHO Ratio 1:5.  Correction Dose 1 unit for every 50 over 150.       POC A1c 05/20/2022: 7.3%  Point-of-care A1c 11/29/2021: 6.1%  Serum glycohemoglobin 07/22/2021: 6.1%    BG Diary 05/20/2022 testing glucose twice per day.   Average .    .  Patient reports no hypoglycemic events since last appointment. Patient stopped being overly aggressive with postprandial corrections. Her glucose average has trended up slightly. However the patient required medrol dose keri in the last 6 weeks as well.     Weight has increased 4 pounds since last appointment.     Diabetes Complications   Retinopathy: No known retinopathy.  Last eye exam: 03/2022-Dr. Manuel.   Neuropathy: Denies paresthesias or numbness in hands or feet. Denies any foot wounds.  Exercise: Minimal.  Diet: Fair.      BP currently 126/72.  No current ACE or ARB therapy.     Latest Reference Range & Units 11/17/21 15:30   Creatinine, Urine mg/dL 19.83   Microalbumin, Urine Random mg/dL <1.2         Hypothyroidism   Currently taking Levothyroxine 112mcg QD with extra 1/2 twice a week for the last month.   Pt takes thyroid hormone 1 hour before breakfast, adequate compliance.  Denies taking calcium, iron and antacid supplements.   Patient denies mental fogginess and constipation. Patient denies palpitations and increased anxiousness.  Patient also denies dysphagia, shortness of breath  and anterior neck tenderness     Latest Reference Range & Units 03/09/22 15:10   TSH 0.380 - 5.330 uIU/mL 1.190 [1]   Free T-4 0.93 - 1.70 ng/dL 1.15   T3,Free 2.00 - 4.40 pg/mL 2.56         Dyslipidemia  Currently taking Lovastatin 20mg QD.  Patient has tolerated statin therapy well for over 2 years.  Denies muscle fatigue and weakness.      Latest Reference Range & Units 07/22/21 07:20   Cholesterol,Tot 100 - 199 mg/dL 192   Triglycerides 0 - 149 mg/dL 102   HDL >=40 mg/dL 91   LDL <100 mg/dL 81       Vitamin D Deficiency  Currently taking Vitamin D 2000IU QD.      Latest Reference Range & Units 03/09/22 15:10   25-Hydroxy   Vitamin D 25 30 - 100 ng/mL 62 [1]          Patient's medications, allergies, and social histories were reviewed and updated as appropriate.    ROS:     CONS:     No fever, no chills   EYES:     No diplopia, no blurry vision   CV:           No chest pain, no palpitations   PULM:     Positive SOB, Positive cough, no hemoptysis.   GI:            No nausea, no vomiting, no diarrhea, no constipation   ENDO:     No polyuria, no polydipsia, no heat intolerance, no cold intolerance       Past Medical History:  Problem List:  2021-09: MICHAELA (obstructive sleep apnea)  2021-09: Bacteremia  2021-08: Sepsis ([facility])  2021-08: Thrombocytopenia ([facility])  2016-12: Influenza A  2016-12: COPD (chronic obstructive pulmonary disease) ([facility])  2016-12: Osteoporosis  2016-12: Pneumonia  2016-12: Acute on chronic respiratory failure with hypoxia ([facility])  2016-10: Hypotension  2016-10: Dehydration  2016-10: Tobacco abuse  2014-11: Insulin pump status  2014-08: Type 1 diabetes mellitus with neurological manifestations,   uncontrolled ([facility])  2014-08: Diabetic polyneuropathy ([facility])  2014-04: Disorder of bone and cartilage  2014-04: Fitting and adjustment of insulin pump  2014-04: Encounter for long-term (current) use of insulin ([facility])  2014-02: Low back pain  2013-09: Hypothyroid  2013-09: Obesity  2013-09: Postoperative anemia due  "to acute blood loss  2013-09: Hip pain  2013-08: Hypertension  2013-08: Dyslipidemia  2013-08: Vitamin D deficiency  2013-08: Uncontrolled type 1 diabetes mellitus (HCC)      Past Surgical History:  Past Surgical History:   Procedure Laterality Date   • HIP ARTHROPLASTY TOTAL  9/9/2013    Performed by Pedro Warrne M.D. at [facility]   • BREAST RECONSTRUCTION  2001   • MASTECTOMY  1992    right, full   • MASTECTOMY  1987, 2001    partial, left x2        Allergies:  Bactrim [sulfamethoxazole w-trimethoprim] and Percocet [oxycodone-acetaminophen]     Social History:  Social History     Tobacco Use   • Smoking status: Current Every Day Smoker     Packs/day: 1.00     Years: 40.00     Pack years: 40.00     Types: Cigarettes   • Smokeless tobacco: Never Used   Vaping Use   • Vaping Use: Never used   Substance Use Topics   • Alcohol use: Yes     Comment: 2 per day   • Drug use: No        Family History:   family history includes Lung Disease in an other family member.      PHYSICAL EXAM:   OBJECTIVE:  Vital signs: /72 (BP Location: Left arm, Patient Position: Sitting, BP Cuff Size: Large adult)   Pulse 69   Ht 1.727 m (5' 8\")   Wt 103 kg (226 lb)   SpO2 100%   BMI 34.36 kg/m²   GENERAL: Well-developed, well-nourished in no apparent distress.   EYE:  No ocular asymmetry, PERRLA  HENT: Pink, moist mucous membranes.    NECK: No thyromegaly.   CARDIOVASCULAR:  No murmurs  LUNGS: Decreased breath sounds T/O  ABDOMEN: Soft, nontender   EXTREMITIES: No clubbing, cyanosis, or edema.   NEUROLOGICAL: No gross focal motor abnormalities   LYMPH: No cervical adenopathy palpated.   SKIN: No rashes, lesions.       ASSESSMENT/PLAN:     1. Uncontrolled type 1 diabetes mellitus with hyperglycemia (HCC)  Stable.  Goal for A1c is below 7.5%; minimal hypoglycemic episodes.   Current Diabetes Regimen:  Tresiba 44 units per day  Fiasp  to 8-12 units with meals 1:5  Correction Dose of 1 unit for every 50 over 150. "     Patient has been approved for drug therapy assistance through the pharmaceutical [employer].  The insulin is shipped to our office and the patient will be notified when the medications are available.    Continue balanced meal planning, my plate method.   Recommend 2L-3L of water daily.   Recommend exercise 150 minutes per week.   Daily foot inspection.   Dilated EYE Exam Due in 12/2021 and patient has an appointment tomorrow.    2. Acquired hypothyroidism  Stable.  Continue taking Levothyroxine 112mcg QD with extra 1/2 twice a month.      3. Dyslipidemia  Stable  Continue Lovastatin 20mg QD.     4. Vitamin D deficiency  Stable  Continue Vitamin D 2000IU QD.     5.  Encounter for long-term use of insulin  Unstable.  Plan as per #1.    Repeat labs 1 week prior to appointment.   Next appointment in 4-5 months.      Thank you kindly for allowing me to participate in the diabetes care plan for this patient.    Rossy Lowry, APRN  05/20/2022      CC:   Blaire Perez M.D.

## 2022-05-23 ENCOUNTER — OFFICE VISIT (OUTPATIENT)
Dept: SLEEP MEDICINE | Facility: MEDICAL CENTER | Age: 72
End: 2022-05-23
Payer: MEDICARE

## 2022-05-23 VITALS
BODY MASS INDEX: 34.1 KG/M2 | HEART RATE: 95 BPM | WEIGHT: 225 LBS | SYSTOLIC BLOOD PRESSURE: 130 MMHG | OXYGEN SATURATION: 93 % | DIASTOLIC BLOOD PRESSURE: 76 MMHG | RESPIRATION RATE: 16 BRPM | HEIGHT: 68 IN

## 2022-05-23 DIAGNOSIS — J96.11 CHRONIC RESPIRATORY FAILURE WITH HYPOXIA (HCC): ICD-10-CM

## 2022-05-23 DIAGNOSIS — J44.9 CHRONIC OBSTRUCTIVE PULMONARY DISEASE, UNSPECIFIED COPD TYPE (HCC): ICD-10-CM

## 2022-05-23 DIAGNOSIS — Z72.0 TOBACCO USE: ICD-10-CM

## 2022-05-23 PROCEDURE — 99204 OFFICE O/P NEW MOD 45 MIN: CPT | Performed by: INTERNAL MEDICINE

## 2022-05-23 RX ORDER — AZITHROMYCIN 250 MG/1
TABLET, FILM COATED ORAL
Qty: 6 TABLET | Refills: 0 | Status: SHIPPED | OUTPATIENT
Start: 2022-05-23 | End: 2022-10-14

## 2022-05-23 RX ORDER — PREDNISONE 10 MG/1
TABLET ORAL
Qty: 18 TABLET | Refills: 0 | Status: SHIPPED | OUTPATIENT
Start: 2022-05-23 | End: 2022-10-06

## 2022-05-23 ASSESSMENT — ENCOUNTER SYMPTOMS
CONSTIPATION: 0
SORE THROAT: 0
DIAPHORESIS: 0
SHORTNESS OF BREATH: 1
WEIGHT LOSS: 0
DEPRESSION: 0
DIARRHEA: 0
PHOTOPHOBIA: 0
EYE DISCHARGE: 0
NAUSEA: 0
HEMOPTYSIS: 0
STRIDOR: 0
EYE PAIN: 0
FALLS: 0
EYE REDNESS: 0
WHEEZING: 0
DIZZINESS: 0
PND: 0
NECK PAIN: 0
MYALGIAS: 0
BLURRED VISION: 0
COUGH: 1
PALPITATIONS: 0
HEADACHES: 0
CHILLS: 0
BACK PAIN: 0
FEVER: 0
FOCAL WEAKNESS: 0
WEAKNESS: 0
ORTHOPNEA: 0
SPUTUM PRODUCTION: 0
SPEECH CHANGE: 0
CLAUDICATION: 0
HEARTBURN: 0
VOMITING: 0
SINUS PAIN: 0
ABDOMINAL PAIN: 0
DOUBLE VISION: 0
TREMORS: 0

## 2022-05-23 ASSESSMENT — FIBROSIS 4 INDEX: FIB4 SCORE: 0.51

## 2022-05-23 NOTE — PROGRESS NOTES
Chief Complaint   Patient presents with   • Establish Care     Ref by NILA Perez M.D. for COPD and Pneumonia, unspecified organism         HPI: This patient is a 71 y.o. female presenting for evaluation of COPD.  Past medical history is significant for type 1 diabetes, hypothyroid, dyslipidemia, recent diagnosis of obstructive sleep apnea and COPD with hypoxic respiratory failure.  She had been started on supplemental oxygen by her primary care per patient in the last year although had purchased a portable concentrator on her own in the past.  She is a current tobacco smoker with greater than 40-pack-year history currently smoking just under a pack per day.  She has been on Spiriva and Qvar in the past and more recently was given Breztri samples which she feels are only slightly more effective than her combination therapy previously.  She has a nebulizer at home which she uses infrequently and albuterol.  She is currently on supplemental oxygen at 3 L at rest and 5 L with activity but still occasionally desaturates to as low as 86% on 5 L.  Functionally she is mMRC 0-1.  She was hospitalized for pneumonia and E. coli bacteremia in late August and early September of last year.  CT chest at that time showed emphysematous changes with patchy groundglass infiltrates bilaterally involving both upper and lower lobes.  No follow-up imaging.  No pulmonary nodules.  No pulmonary function testing.  She does have chronic cough with difficulty clearing mucus.  Over the past week her chronic cough has worsened in addition to worsening chest congestion and she has both a 4-year-old and a 6-year-old grandson at home currently 6-year-old is ill at home from school.    Past Medical History:   Diagnosis Date   • Anesthesia     PONV   • Arthritis    • ASTHMA    • Cancer (HCC)     history of breast cancer   • Chickenpox    • Cough    • Dental disorder     dentures, full upper and lower   • Diabetes     insulin pump   • Diabetic  Chief Complaint   Patient presents with   • Throat Problem     Pain in throat       HPI: This patient is a 41 year old male who is seen for evaluation.  He complains of a sore throat for 3 days.  He feels as though there is something stuck in his throat.  He has not had any exacerbation of his sore throat with swallowing.  He has not had any fever.  He has heartburn and takes omeprazole as prescribed during his last visit.  He drinks 3-4 glasses of water per day and 12 cups of regular coffee a day.  He also has an occasional caffeinated soda.  He is able to finish his meals.  He has not had any difficulty breathing.  His nose has been plugged.      Past Medical History:   Diagnosis Date   • Allergy    • Essential (primary) hypertension    • High cholesterol        ALLERGIES:   Allergen Reactions   • Pollen Other (See Comments)       Current Outpatient Medications   Medication Sig Dispense Refill   • omeprazole (PrilOSEC) 20 MG capsule Take 1 capsule by mouth every evening. Indications: Gastroesophageal Reflux Disease, Heartburn 90 capsule 3   • tiZANidine (ZANAFLEX) 4 MG tablet TAKE 1 TABLET BY MOUTH EVERY NIGHT AS NEEDED FOR MUSCLE SPASMS 10 tablet 0   • predniSONE (DELTASONE) 20 MG tablet 2 po for 3 days then 1 po for 3 days in Am after eating 9 tablet 0   • losartan (COZAAR) 25 MG tablet Take 1 tablet by mouth daily. 90 tablet 3   • pantoprazole (PROTONIX) 40 MG tablet Take 1 tablet by mouth daily. 90 tablet 3   • fenofibrate (TRICOR) 54 MG tablet Take 1 tablet by mouth daily. 90 tablet 2   • montelukast (Singulair) 10 MG tablet Take 1 tablet by mouth nightly. 90 tablet 3   • rizatriptan (Maxalt) 10 MG tablet Take 1 tablet by mouth at onset of migraine. May repeat after 2 hours if needed. 10 tablet 0   • sertraline (ZOLOFT) 50 MG tablet Take 1 tablet by mouth 2 times daily. 60 tablet 2   • albuterol 108 (90 Base) MCG/ACT inhaler INHALE 2 PUFFS INTO THE LUNGS EVERY 4 HOURS AS NEEDED FOR WHEEZING 8.5 g 1   •  Vitamin D, Ergocalciferol, 1.25 mg (50,000 units) capsule Take 1 capsule by mouth 1 day a week. 16 capsule 0   • cetirizine (ZyrTEC Allergy) 10 MG tablet Take 1 tablet by mouth daily. 90 tablet 3   • olopatadine (Pataday) 0.2 % ophthalmic solution Place 1 drop into both eyes daily. 2.5 mL 11   • hydrOXYzine (ATARAX) 10 MG tablet Take 1 tablet by mouth 3 times daily as needed for Anxiety. 30 tablet 3   • mupirocin (BACTROBAN) 2 % cream Apply topically 3 times daily. 15 g 0   • EPINEPHrine 0.3 MG/0.3ML auto-injector Inject 0.3 mLs into the muscle once as needed for Anaphylaxis. 2 each 4   • fluticasone (Flonase) 50 MCG/ACT nasal spray Spray 1 spray in each nostril daily for 7 days. 1 each 0     No current facility-administered medications for this visit.       ROS: Patient denies any weight loss, fatigue, chest pain or abdominal pain. Patient denies any difficulty breathing or swallowing.    The patient's past medical history, ROS, medications and allergies are all reviewed.    PE: Well nourished, well developed male in no acute distress, alert and oriented.    Visit Vitals  /78   Pulse 74   Resp 16   SpO2 99%       Eyes: Symmetric, occular mobility grossly intact bilaterally.    Skin: No suspicious masses or lesion in the head and neck region.    Ears: External auditory canals and tympanic membranes are normal. No evidence of effusion or inflammation. Hearing grossly intact bilaterally to finger rub.    Nose: Anterior rhinoscopy shows bilateral septal deflections and large inferior turbinates.    Oral Cavity/Oropharynx: Unremarkable, tonsils are 2+ without erythema. There is no evidence of any mucosal mass or lesion.    Hypopharynx/larynx:  Indirect examination was unremarkable except for mild edema over the posterior larynx.  Vocal fold motion is normal bilaterally    Neck: No suspicious musculoskeletal mass or lymphadenopathy, no palpable thyroid mass or enlargement. Trachea midline.    Chest: Breathing is  neuropathy ([facility])    • EMPHYSEMA    • Encounter for long-term (current) use of insulin ([facility]) 04/23/2014   • Epilepsy ([facility])    • Fitting and adjustment of insulin pump 04/23/2014   • Heart burn    • Mumps    • osteopenia 04/23/2014   • Pain     hip   • Psychiatric problem     depression   • Shortness of breath    • Sleep apnea     CPAP   • Unspecified disorder of thyroid     hypothyroid   • Wheezing        Social History     Socioeconomic History   • Marital status:      Spouse name: Not on file   • Number of children: Not on file   • Years of education: Not on file   • Highest education level: Not on file   Occupational History   • Not on file   Tobacco Use   • Smoking status: Current Every Day Smoker     Packs/day: 1.00     Years: 40.00     Pack years: 40.00     Types: Cigarettes   • Smokeless tobacco: Never Used   Vaping Use   • Vaping Use: Never used   Substance and Sexual Activity   • Alcohol use: Yes     Comment: 2 per day   • Drug use: No   • Sexual activity: Not on file   Other Topics Concern   • Not on file   Social History Narrative   • Not on file     Social Determinants of Health     Financial Resource Strain: Not on file   Food Insecurity: Not on file   Transportation Needs: Not on file   Physical Activity: Not on file   Stress: Not on file   Social Connections: Not on file   Intimate Partner Violence: Not on file   Housing Stability: Not on file       Family History   Problem Relation Age of Onset   • Lung Disease Other        Current Outpatient Medications on File Prior to Visit   Medication Sig Dispense Refill   • celecoxib (CELEBREX) 200 MG Cap Take 200 mg by mouth every day.     • levothyroxine (SYNTHROID) 112 MCG Tab TAKE 1 TABLET BY MOUTH IN THE MORNING ON AN EMPTY STOMACH 90 Tablet 0   • albuterol (PROVENTIL) 2.5mg/3ml Nebu Soln solution for nebulization      • Ascorbic Acid (VITAMIN C) 1000 MG Tab Take  by mouth.     • Multiple Vitamins-Minerals (CENTRUM SILVER 50+WOMEN) Tab Take 1 Tablet  by mouth every day.     • albuterol 108 (90 Base) MCG/ACT Aero Soln inhalation aerosol Inhale 1-2 Puffs every four hours as needed for Shortness of Breath.     • Insulin Degludec (TRESIBA FLEXTOUCH) 100 UNIT/ML Solution Pen-injector Inject 44 Units under the skin every day.     • esomeprazole (NEXIUM) 20 MG capsule Take 20 mg by mouth 2 times a day.     • montelukast (SINGULAIR) 10 MG Tab Take 10 mg by mouth every day.     • Insulin Aspart, w/Niacinamide, 100 UNIT/ML Solution Inject 8-15 Units under the skin 3 times a day before meals. 1 unit per 5 grams of carbs  Pt takes with snacks as well as 3 times a day     • Cyanocobalamin (VITAMIN B 12 PO) Take 1 Tablet by mouth every day.     • Cholecalciferol (VITAMIN D) 2000 UNIT Tab Take 4,000 Units by mouth every day.     • captopril (CAPOTEN) 12.5 MG Tab Take 12.5 mg by mouth every day.     • ipratropium-albuterol (DUONEB) 0.5-2.5 (3) MG/3ML nebulizer solution 3 mL by Nebulization route every 6 hours as needed for Shortness of Breath. 25 Bullet 0   • cetirizine (ZYRTEC) 10 MG Tab Take 10 mg by mouth 2 times a day.     • B Complex Cap Take 1 Cap by mouth every day.     • fluoxetine (PROZAC) 20 MG CAPS Take 20 mg by mouth every day.     • raloxifene (EVISTA) 60 MG Tab Take 60 mg by mouth every day.     • aspirin EC (ECOTRIN) 81 MG TBEC Take 81 mg by mouth every day.     • lovastatin (MEVACOR) 20 MG TABS Take 20 mg by mouth every evening.       No current facility-administered medications on file prior to visit.       Bactrim [sulfamethoxazole w-trimethoprim] and Percocet [oxycodone-acetaminophen]      ROS:   Review of Systems   Constitutional: Negative for chills, diaphoresis, fever, malaise/fatigue and weight loss.   HENT: Negative for congestion, ear discharge, ear pain, hearing loss, nosebleeds, sinus pain, sore throat and tinnitus.    Eyes: Negative for blurred vision, double vision, photophobia, pain, discharge and redness.   Respiratory: Positive for cough and  unlabored.    Assessment/Plan: 41 year old male with Globus sensation, GERD, throat pain, nasal septal deviation and inferior turbinate hypertrophy. He is reassured.  He is asked to continue with omeprazole and to minimize his caffeine intake.  He should increase his water intake and use a warm humidifier in his bedroom at night.  He is scheduled for septoplasty and inferior turbinate resection over the next 3 weeks.   "shortness of breath. Negative for hemoptysis, sputum production, wheezing and stridor.    Cardiovascular: Negative for chest pain, palpitations, orthopnea, claudication, leg swelling and PND.   Gastrointestinal: Negative for abdominal pain, constipation, diarrhea, heartburn, nausea and vomiting.   Genitourinary: Negative for dysuria and urgency.   Musculoskeletal: Negative for back pain, falls, joint pain, myalgias and neck pain.   Skin: Negative for itching and rash.   Neurological: Negative for dizziness, tremors, speech change, focal weakness, weakness and headaches.   Endo/Heme/Allergies: Negative for environmental allergies.   Psychiatric/Behavioral: Negative for depression.       /76 (BP Location: Left arm, Patient Position: Sitting, BP Cuff Size: Adult)   Pulse 95   Resp 16   Ht 1.727 m (5' 8\")   Wt 102 kg (225 lb)   SpO2 93%   Physical Exam  Vitals reviewed.   Constitutional:       General: She is not in acute distress.     Appearance: Normal appearance. She is well-developed and normal weight.   HENT:      Head: Normocephalic and atraumatic.      Right Ear: External ear normal.      Left Ear: External ear normal.      Nose: Nose normal. No congestion.      Mouth/Throat:      Mouth: Mucous membranes are moist.      Pharynx: Oropharynx is clear. No oropharyngeal exudate.   Eyes:      General: No scleral icterus.     Extraocular Movements: Extraocular movements intact.      Conjunctiva/sclera: Conjunctivae normal.      Pupils: Pupils are equal, round, and reactive to light.   Neck:      Vascular: No JVD.      Trachea: No tracheal deviation.   Cardiovascular:      Rate and Rhythm: Normal rate and regular rhythm.      Heart sounds: Normal heart sounds. No murmur heard.    No friction rub. No gallop.   Pulmonary:      Effort: Pulmonary effort is normal. No accessory muscle usage or respiratory distress.      Breath sounds: Wheezing present. No rales.   Abdominal:      General: There is no distension. "      Palpations: Abdomen is soft.      Tenderness: There is no abdominal tenderness.   Musculoskeletal:         General: No tenderness or deformity. Normal range of motion.      Cervical back: Normal range of motion and neck supple.      Right lower leg: No edema.      Left lower leg: No edema.   Lymphadenopathy:      Cervical: No cervical adenopathy.   Skin:     General: Skin is warm and dry.      Findings: No rash.      Nails: There is no clubbing.   Neurological:      Mental Status: She is alert and oriented to person, place, and time.      Cranial Nerves: No cranial nerve deficit.      Gait: Gait normal.   Psychiatric:         Mood and Affect: Mood normal.         Behavior: Behavior normal.         PFTs as reviewed by me personally: as per hPI    Imaging as reviewed by me personally:  As per HPI    Assessment:  1. Chronic obstructive pulmonary disease, unspecified COPD type (HCC)  PULMONARY FUNCTION TESTS -Test requested: Complete Pulmonary Function Test    Fluticasone-Umeclidin-Vilant (TRELEGY ELLIPTA) 100-62.5-25 MCG/INH AEROSOL POWDER, BREATH ACTIVATED inhalation    CT-CHEST (THORAX) W/O    predniSONE (DELTASONE) 10 MG Tab    azithromycin (ZITHROMAX) 250 MG Tab   2. Chronic respiratory failure with hypoxia (HCC)     3. Tobacco use         Plan:  1.  Chronic but new to me.  I suspect severity of her illness is quite severe given oxygen needs and physical exam with decreased air movement throughout.  We will give her a 1 month sample of Trelegy and she can determine which 1 is more effective for her Breztri, or Trelegy and we can continue the preferred agent.  I will obtain pulmonary function testing.  I recommended she start scheduling nebulized bronchodilators twice daily for airway clearance.  Pending pulmonary function testing, we can discuss pulmonary rehab.  I did give her azithromycin and prednisone today for worsening of her baseline cough.  She was counseled on tobacco cessation.  Up-to-date on  vaccines.  2.  Secondary to COPD.  Suspect she has needed oxygen for some time.  We will continue supplemental oxygen at 3 L/min at rest so long as SPO2 is greater than 89% and up to 5 L with activity.  She does have high flow concentrator at home.  Pending pulmonary function test can consider referral to pulmonary rehab.  3.  Patient was counseled on tobacco cessation.  I have ordered follow-up CT to ensure clearance of infiltrates seen during her hospital stay last fall.  After that we can discuss referral for lung cancer screening.  Return in about 4 months (around 9/23/2022) for ct chest, PFTs both at any time.

## 2022-05-25 ENCOUNTER — PATIENT MESSAGE (OUTPATIENT)
Dept: SLEEP MEDICINE | Facility: MEDICAL CENTER | Age: 72
End: 2022-05-25
Payer: MEDICARE

## 2022-06-06 ENCOUNTER — PATIENT MESSAGE (OUTPATIENT)
Dept: SLEEP MEDICINE | Facility: MEDICAL CENTER | Age: 72
End: 2022-06-06
Payer: MEDICARE

## 2022-06-06 DIAGNOSIS — J44.9 CHRONIC OBSTRUCTIVE PULMONARY DISEASE, UNSPECIFIED COPD TYPE (HCC): ICD-10-CM

## 2022-06-06 NOTE — PATIENT COMMUNICATION
Have we ever prescribed this med? Yes.  If yes, what date? 5/23/22     Last OV: 5/23/22 Dr. Majano     Next OV: 10/6/22 Dr. Majano     DX: COPD     Medications: trelegy

## 2022-07-15 ENCOUNTER — TELEPHONE (OUTPATIENT)
Dept: RESPIRATORY THERAPY | Facility: MEDICAL CENTER | Age: 72
End: 2022-07-15
Payer: MEDICARE

## 2022-07-15 NOTE — TELEPHONE ENCOUNTER
COPD program follow up phone call:     Did you stop smoking because of the program? No  Not smoking at time of follow up? No  Did you refill your medications? Yes  Did you take them everyday as prescribed? Yes  Have you seen your PCP since discharging from the hospital? Yes  Have you seen Home Health since discharging from the hospital? n/a  Have you seen [facility] since discharging from the hospital? n/a  Have you seen [facility] since [facility] from the hospital? n/a

## 2022-08-16 DIAGNOSIS — E03.9 ACQUIRED HYPOTHYROIDISM: ICD-10-CM

## 2022-08-16 RX ORDER — LEVOTHYROXINE SODIUM 112 UG/1
112 TABLET ORAL
Qty: 90 TABLET | Refills: 2 | Status: SHIPPED | OUTPATIENT
Start: 2022-08-16 | End: 2024-01-24 | Stop reason: SDUPTHER

## 2022-10-03 ENCOUNTER — HOSPITAL ENCOUNTER (OUTPATIENT)
Dept: RADIOLOGY | Facility: MEDICAL CENTER | Age: 72
End: 2022-10-03
Attending: INTERNAL MEDICINE
Payer: MEDICARE

## 2022-10-03 DIAGNOSIS — J44.9 CHRONIC OBSTRUCTIVE PULMONARY DISEASE, UNSPECIFIED COPD TYPE (HCC): ICD-10-CM

## 2022-10-03 PROCEDURE — 71250 CT THORAX DX C-: CPT

## 2022-10-05 ENCOUNTER — TELEPHONE (OUTPATIENT)
Dept: RESPIRATORY THERAPY | Facility: MEDICAL CENTER | Age: 72
End: 2022-10-05
Payer: MEDICARE

## 2022-10-06 ENCOUNTER — OFFICE VISIT (OUTPATIENT)
Dept: SLEEP MEDICINE | Facility: MEDICAL CENTER | Age: 72
End: 2022-10-06
Payer: MEDICARE

## 2022-10-06 ENCOUNTER — NON-PROVIDER VISIT (OUTPATIENT)
Dept: SLEEP MEDICINE | Facility: MEDICAL CENTER | Age: 72
End: 2022-10-06
Attending: INTERNAL MEDICINE
Payer: MEDICARE

## 2022-10-06 ENCOUNTER — TELEPHONE (OUTPATIENT)
Dept: ENDOCRINOLOGY | Facility: MEDICAL CENTER | Age: 72
End: 2022-10-06

## 2022-10-06 VITALS
SYSTOLIC BLOOD PRESSURE: 124 MMHG | HEIGHT: 68 IN | HEART RATE: 70 BPM | DIASTOLIC BLOOD PRESSURE: 84 MMHG | WEIGHT: 228 LBS | RESPIRATION RATE: 16 BRPM | OXYGEN SATURATION: 99 % | BODY MASS INDEX: 34.56 KG/M2

## 2022-10-06 VITALS — HEIGHT: 68 IN | BODY MASS INDEX: 34.56 KG/M2 | WEIGHT: 228 LBS

## 2022-10-06 DIAGNOSIS — J44.9 CHRONIC OBSTRUCTIVE PULMONARY DISEASE, UNSPECIFIED COPD TYPE (HCC): ICD-10-CM

## 2022-10-06 DIAGNOSIS — R93.2 ABNORMAL LIVER CT: ICD-10-CM

## 2022-10-06 DIAGNOSIS — J96.11 CHRONIC RESPIRATORY FAILURE WITH HYPOXIA (HCC): ICD-10-CM

## 2022-10-06 DIAGNOSIS — Z72.0 TOBACCO USE: ICD-10-CM

## 2022-10-06 DIAGNOSIS — E04.1 THYROID NODULE: ICD-10-CM

## 2022-10-06 DIAGNOSIS — Z23 NEED FOR VACCINATION: ICD-10-CM

## 2022-10-06 PROCEDURE — G0008 ADMIN INFLUENZA VIRUS VAC: HCPCS | Performed by: INTERNAL MEDICINE

## 2022-10-06 PROCEDURE — 94060 EVALUATION OF WHEEZING: CPT | Performed by: INTERNAL MEDICINE

## 2022-10-06 PROCEDURE — 94729 DIFFUSING CAPACITY: CPT | Performed by: INTERNAL MEDICINE

## 2022-10-06 PROCEDURE — 99214 OFFICE O/P EST MOD 30 MIN: CPT | Mod: 25 | Performed by: INTERNAL MEDICINE

## 2022-10-06 PROCEDURE — 94726 PLETHYSMOGRAPHY LUNG VOLUMES: CPT | Performed by: INTERNAL MEDICINE

## 2022-10-06 PROCEDURE — 90662 IIV NO PRSV INCREASED AG IM: CPT | Performed by: INTERNAL MEDICINE

## 2022-10-06 RX ORDER — CAPTOPRIL 25 MG/1
25 TABLET ORAL 2 TIMES DAILY
COMMUNITY

## 2022-10-06 ASSESSMENT — ENCOUNTER SYMPTOMS
DIARRHEA: 0
TREMORS: 0
NECK PAIN: 0
CONSTIPATION: 0
NAUSEA: 0
CHILLS: 0
SPUTUM PRODUCTION: 1
DOUBLE VISION: 0
HEADACHES: 0
DEPRESSION: 0
COUGH: 1
WHEEZING: 0
EYE DISCHARGE: 0
MYALGIAS: 0
FALLS: 0
PHOTOPHOBIA: 0
EYE PAIN: 0
CLAUDICATION: 0
SHORTNESS OF BREATH: 1
ORTHOPNEA: 0
VOMITING: 0
ABDOMINAL PAIN: 0
FEVER: 0
WEAKNESS: 0
PND: 0
SORE THROAT: 0
STRIDOR: 0
PALPITATIONS: 0
FOCAL WEAKNESS: 0
BLURRED VISION: 0
BACK PAIN: 0
HEARTBURN: 0
DIAPHORESIS: 0
SINUS PAIN: 0
SPEECH CHANGE: 0
DIZZINESS: 0
HEMOPTYSIS: 0
WEIGHT LOSS: 0
EYE REDNESS: 0

## 2022-10-06 ASSESSMENT — PULMONARY FUNCTION TESTS
FEV1/FVC: 45
FEV1/FVC_PERCENT_CHANGE: 7
FEV1/FVC_PERCENT_PREDICTED: 53
FEV1/FVC_PERCENT_PREDICTED: 57
FEV1: 0.9
FVC_PERCENT_PREDICTED: 57
FEV1/FVC_PREDICTED: 77
FEV1/FVC_PERCENT_PREDICTED: 53
FEV1/FVC_PERCENT_PREDICTED: 57
FEV1_LLN: 2.04
FVC_LLN: 2.67
FVC_PREDICTED: 3.2
FVC: 2.03
FEV1_PERCENT_CHANGE: 20
FEV1/FVC: 44.33
FVC: 1.82
FEV1/FVC: 41
FEV1/FVC: 41
FEV1/FVC_PERCENT_CHANGE: 182
FEV1_PERCENT_PREDICTED: 30
FEV1: 0.75
FEV1_PREDICTED: 2.45
FEV1/FVC_PERCENT_LLN: 65
FEV1_PERCENT_CHANGE: 11
FEV1_PERCENT_PREDICTED: 36
FVC_PERCENT_PREDICTED: 63
FEV1/FVC_PERCENT_PREDICTED: 77

## 2022-10-06 ASSESSMENT — FIBROSIS 4 INDEX
FIB4 SCORE: 0.52
FIB4 SCORE: 0.52

## 2022-10-06 NOTE — PROCEDURES
Technician: Nanda Wood RRT, CPFT  Good patient effort & cooperation.  The results of this test meet the ATS/ERS standards for acceptability & reproducibility.  Test was performed on the [employer] Body Plethysmograph-Elite DX system.  Predicted equations for Spirometry are GLI-2012, ITS for lung volumes, and GLI-2017 for DLCO.  The DLCO was uncorrected for Hgb.  A bronchodilator of Ventolin HFA -2puffs via spacer administered.  DLCO performed during dilation period.    Interpretation;    Baseline spirometry shows airflow obstruction with FEV1/FVC ratio 41 and FEV1 of 0.75 L or 30% predicted.  There is significant bronchodilator response based on improvement in FEV1 2.9 L or 36% predicted which is a 20% improvement.  Total lung capacity is within normal limits at 106% predicted however there is air trapping with residual volume of 164% predicted.  Diffusion capacity is reduced at 57% predicted.  Pulmonary function testing shows airflow obstruction with air trapping and reduction in DLCO consistent with stated diagnosis of COPD.  There is significant bronchodilator responsiveness.

## 2022-10-06 NOTE — PROGRESS NOTES
Chief Complaint   Patient presents with    Follow-Up     4 month fv   last seen on 5/23/2022- Dr. Majano     [facility]     CT - Chest done 10/3/2022   PFT done on 10/6/2022         HPI: This patient is a 72 y.o. female whom is followed in our clinic for COPD c/b chronic hypoxic respiratory failure last seen by me on 5/23/22.  Past medical history is significant for type 1 diabetes, hypothyroid, dyslipidemia, MICHAELA on CPAP. She had been started on supplemental oxygen by her primary care per patient in the last year although had purchased a portable concentrator on her own in the past.  She is a current tobacco smoker with greater than 40-pack-year history currently smoking just under a pack per day.  She has been on Spiriva and Qvar in the past and we transitioned her to Trelegy 100 last visit. She uses nebulized bronchodilators and mucinex for airway clearance. She is currently on supplemental oxygen at 3 L at rest and 5 L with activity but still occasionally desaturates to as low as 86% on 5 L.  Functionally she is mMRC 0-1.  She was hospitalized for pneumonia and E. coli bacteremia in late August and early September of last year.  CT chest at that time showed emphysematous changes with patchy groundglass infiltrates bilaterally involving both upper and lower lobes. We ordered repeat CT which showed clearance of infiltrate but also showed R thyroid nodule 15 mm in size and hypoattenuation of the liver and focal densisities more prominent than on prior CT. PFTs today show severe air flow obstruction with FEV1 post BD of 36% pred, +BD response, normal TLC at 106% pred, air trapping with RV of 164% pred and DLCO of 57% pred. She is mainly sedentary and we did discuss pulmonary rehab which she is a candidate for and is interested in participating. No exacerbation since our last visit.     Past Medical History:   Diagnosis Date    Anesthesia     PONV    Arthritis     ASTHMA     Cancer (HCC)     history of breast  cancer    Chickenpox     Cough     Dental disorder     dentures, full upper and lower    Diabetes     insulin pump    Diabetic neuropathy (HCC)     EMPHYSEMA     Encounter for long-term (current) use of insulin (HCC) 04/23/2014    Epilepsy (HCC)     Fitting and adjustment of insulin pump 04/23/2014    Heart burn     Mumps     osteopenia 04/23/2014    Pain     hip    Psychiatric problem     depression    Shortness of breath     Sleep apnea     CPAP    Unspecified disorder of thyroid     hypothyroid    Wheezing        Social History     Socioeconomic History    Marital status:      Spouse name: Not on file    Number of children: Not on file    Years of education: Not on file    Highest education level: Not on file   Occupational History    Not on file   Tobacco Use    Smoking status: Every Day     Packs/day: 1.00     Years: 40.00     Pack years: 40.00     Types: Cigarettes    Smokeless tobacco: Never   Vaping Use    Vaping Use: Never used   Substance and Sexual Activity    Alcohol use: Yes     Comment: 2 per day    Drug use: No    Sexual activity: Not on file   Other Topics Concern    Not on file   Social History Narrative    Not on file     Social Determinants of Health     Financial Resource Strain: Not on file   Food Insecurity: Not on file   Transportation Needs: Not on file   Physical Activity: Not on file   Stress: Not on file   Social Connections: Not on file   Intimate Partner Violence: Not on file   Housing Stability: Not on file       Family History   Problem Relation Age of Onset    Lung Disease Other        Current Outpatient Medications on File Prior to Visit   Medication Sig Dispense Refill    captopril (CAPOTEN) 25 MG Tab Take 25 mg by mouth 2 times a day.      levothyroxine (SYNTHROID) 112 MCG Tab Take 1 Tablet by mouth every morning on an empty stomach. 90 Tablet 2    Fluticasone-Umeclidin-Vilant (TRELEGY ELLIPTA) 100-62.5-25 MCG/INH AEROSOL POWDER, BREATH ACTIVATED inhalation Inhale 1  Inhalation every day. 1 Each 6    celecoxib (CELEBREX) 200 MG Cap Take 200 mg by mouth every day.      albuterol (PROVENTIL) 2.5mg/3ml Nebu Soln solution for nebulization       Ascorbic Acid (VITAMIN C) 1000 MG Tab Take  by mouth.      Multiple Vitamins-Minerals (CENTRUM SILVER 50+WOMEN) Tab Take 1 Tablet by mouth every day.      albuterol 108 (90 Base) MCG/ACT Aero Soln inhalation aerosol Inhale 1-2 Puffs every four hours as needed for Shortness of Breath.      Insulin Degludec (TRESIBA FLEXTOUCH) 100 UNIT/ML Solution Pen-injector Inject 44 Units under the skin every day.      esomeprazole (NEXIUM) 20 MG capsule Take 20 mg by mouth 2 times a day.      montelukast (SINGULAIR) 10 MG Tab Take 10 mg by mouth every day.      Insulin Aspart, w/Niacinamide, 100 UNIT/ML Solution Inject 8-15 Units under the skin 3 times a day before meals. 1 unit per 5 grams of carbs  Pt takes with snacks as well as 3 times a day      Cyanocobalamin (VITAMIN B 12 PO) Take 1 Tablet by mouth every day.      Cholecalciferol (VITAMIN D) 2000 UNIT Tab Take 4,000 Units by mouth every day.      ipratropium-albuterol (DUONEB) 0.5-2.5 (3) MG/3ML nebulizer solution 3 mL by Nebulization route every 6 hours as needed for Shortness of Breath. 25 Bullet 0    cetirizine (ZYRTEC) 10 MG Tab Take 10 mg by mouth 2 times a day.      B Complex Cap Take 1 Cap by mouth every day.      fluoxetine (PROZAC) 20 MG CAPS Take 20 mg by mouth every day.      raloxifene (EVISTA) 60 MG Tab Take 60 mg by mouth every day.      aspirin EC (ECOTRIN) 81 MG TBEC Take 81 mg by mouth every day.      lovastatin (MEVACOR) 20 MG TABS Take 20 mg by mouth every evening.      predniSONE (DELTASONE) 10 MG Tab Take 30mg x 3 days, then take 20mg x 3 days, then take 10mg x 3 days, with food, then discontinue. 18 Tablet 0    azithromycin (ZITHROMAX) 250 MG Tab Take 2 tablets on day 1, then take 1 tablet a day for 4 days. 6 Tablet 0    captopril (CAPOTEN) 12.5 MG Tab Take 12.5 mg by mouth  "every day.       No current facility-administered medications on file prior to visit.       Bactrim [sulfamethoxazole w-trimethoprim] and Percocet [oxycodone-acetaminophen]      ROS:   Review of Systems   Constitutional:  Negative for chills, diaphoresis, fever, malaise/fatigue and weight loss.   HENT:  Negative for congestion, ear discharge, ear pain, hearing loss, nosebleeds, sinus pain, sore throat and tinnitus.    Eyes:  Negative for blurred vision, double vision, photophobia, pain, discharge and redness.   Respiratory:  Positive for cough, sputum production and shortness of breath. Negative for hemoptysis, wheezing and stridor.    Cardiovascular:  Negative for chest pain, palpitations, orthopnea, claudication, leg swelling and PND.   Gastrointestinal:  Negative for abdominal pain, constipation, diarrhea, heartburn, nausea and vomiting.   Genitourinary:  Negative for dysuria and urgency.   Musculoskeletal:  Negative for back pain, falls, joint pain, myalgias and neck pain.   Skin:  Negative for itching and rash.   Neurological:  Negative for dizziness, tremors, speech change, focal weakness, weakness and headaches.   Endo/Heme/Allergies:  Negative for environmental allergies.   Psychiatric/Behavioral:  Negative for depression.      /84 (BP Location: Right arm, Patient Position: Sitting, BP Cuff Size: Adult)   Pulse 70   Resp 16   Ht 1.727 m (5' 8\")   Wt 103 kg (228 lb)   SpO2 99%   Physical Exam  Constitutional:       General: She is not in acute distress.     Appearance: Normal appearance. She is well-developed and normal weight.   HENT:      Head: Normocephalic and atraumatic.      Right Ear: External ear normal.      Left Ear: External ear normal.      Nose: Nose normal. No congestion.      Mouth/Throat:      Mouth: Mucous membranes are moist.      Pharynx: Oropharynx is clear. No oropharyngeal exudate.   Eyes:      General: No scleral icterus.     Extraocular Movements: Extraocular movements " intact.      Conjunctiva/sclera: Conjunctivae normal.      Pupils: Pupils are equal, round, and reactive to light.   Neck:      Vascular: No JVD.      Trachea: No tracheal deviation.   Cardiovascular:      Rate and Rhythm: Normal rate and regular rhythm.      Heart sounds: Normal heart sounds. No murmur heard.    No friction rub. No gallop.   Pulmonary:      Effort: Pulmonary effort is normal. No accessory muscle usage or respiratory distress.      Breath sounds: Normal breath sounds. No wheezing or rales.   Abdominal:      General: There is no distension.      Palpations: Abdomen is soft.      Tenderness: There is no abdominal tenderness.   Musculoskeletal:         General: No tenderness or deformity. Normal range of motion.      Cervical back: Normal range of motion and neck supple.      Right lower leg: No edema.      Left lower leg: No edema.   Lymphadenopathy:      Cervical: No cervical adenopathy.   Skin:     General: Skin is warm and dry.      Findings: No rash.      Nails: There is no clubbing.   Neurological:      Mental Status: She is alert and oriented to person, place, and time.      Cranial Nerves: No cranial nerve deficit.      Gait: Gait normal.   Psychiatric:         Behavior: Behavior normal.       PFTs as reviewed by me personally: as per hPI    Imaging as reviewed by me personally:  as per hPI    Assessment:  1. Chronic obstructive pulmonary disease, unspecified COPD type (HCC)  Referral to Pulmonary Rehab    DME Other      2. Chronic respiratory failure with hypoxia (HCC)  Referral to Pulmonary Rehab    DME Other      3. Need for vaccination  INFLUENZA VACCINE, HIGH DOSE (65+ ONLY)      4. Tobacco use        5. Thyroid nodule        6. Abnormal liver CT            Plan:  Chronic, severe and unknown if progressive. Sxs controlled currently on Trelegy but functionally she is mMRC 3-4. Referral to pulmonary rehab. Add flutter valve use for airway clearance to nebulized COREY and mucinex. Continue  trelegy and supplemental O2. Counseled on tobacco cessation  Chronic and 2/2 COPD. Pt is compliant with and benefiting from O2 use at 3-5LPM. Referral to pulmonary rehab  Pt given influenza vaccine today  Counseled on tobacco cessation; can refer for lung Ca screening in one year  Pt see endocrine later this month  Will CC PCP for further evaluation  Return in about 6 months (around 4/6/2023).

## 2022-10-06 NOTE — Clinical Note
Michelle is new to you but apparently saw your NP previously. She has a 15 mm thyroid nodule I wanted to inform you of on her CT chest

## 2022-10-07 NOTE — TELEPHONE ENCOUNTER
Patient's primary care informed me that the patient has an incidental thyroid nodule seen on chest CT    We will discuss this on her appointment

## 2022-10-10 ENCOUNTER — TELEPHONE (OUTPATIENT)
Dept: SLEEP MEDICINE | Facility: MEDICAL CENTER | Age: 72
End: 2022-10-10
Payer: MEDICARE

## 2022-10-10 DIAGNOSIS — J44.9 CHRONIC OBSTRUCTIVE PULMONARY DISEASE, UNSPECIFIED COPD TYPE (HCC): ICD-10-CM

## 2022-10-10 NOTE — TELEPHONE ENCOUNTER
"VOICEMAIL  1. Caller Name: patient                       Call Back Number: [phone] (home)     2. Message: patient is currently in the \"donut hole\" on her trelegy medication. Her co pay is 150. Patient called insurance and they will submit a PA for lower co pay cost. She cannot be inform exactly how much. Patient is unsure what she should do regarding getting medication. Please advise.   Samples  Assistance program  Pharmacy program     3. Patient approves office to leave a detailed voicemail/Appfoliohart message: N\A  "

## 2022-10-12 ENCOUNTER — HOSPITAL ENCOUNTER (OUTPATIENT)
Dept: LAB | Facility: MEDICAL CENTER | Age: 72
End: 2022-10-12
Attending: NURSE PRACTITIONER
Payer: MEDICARE

## 2022-10-12 LAB
25(OH)D3 SERPL-MCNC: 54 NG/ML (ref 30–100)
ALBUMIN SERPL BCP-MCNC: 3.9 G/DL (ref 3.2–4.9)
ALBUMIN/GLOB SERPL: 1.6 G/DL
ALP SERPL-CCNC: 114 U/L (ref 30–99)
ALT SERPL-CCNC: 22 U/L (ref 2–50)
ANION GAP SERPL CALC-SCNC: 11 MMOL/L (ref 7–16)
AST SERPL-CCNC: 29 U/L (ref 12–45)
BILIRUB SERPL-MCNC: 0.5 MG/DL (ref 0.1–1.5)
BUN SERPL-MCNC: 8 MG/DL (ref 8–22)
CALCIUM SERPL-MCNC: 9 MG/DL (ref 8.5–10.5)
CHLORIDE SERPL-SCNC: 103 MMOL/L (ref 96–112)
CHOLEST SERPL-MCNC: 187 MG/DL (ref 100–199)
CO2 SERPL-SCNC: 26 MMOL/L (ref 20–33)
CREAT SERPL-MCNC: 0.9 MG/DL (ref 0.5–1.4)
CREAT UR-MCNC: 44.26 MG/DL
FASTING STATUS PATIENT QL REPORTED: NORMAL
GFR SERPLBLD CREATININE-BSD FMLA CKD-EPI: 68 ML/MIN/1.73 M 2
GLOBULIN SER CALC-MCNC: 2.4 G/DL (ref 1.9–3.5)
GLUCOSE SERPL-MCNC: 140 MG/DL (ref 65–99)
HDLC SERPL-MCNC: 73 MG/DL
LDLC SERPL CALC-MCNC: 88 MG/DL
MICROALBUMIN UR-MCNC: <1.2 MG/DL
MICROALBUMIN/CREAT UR: NORMAL MG/G (ref 0–30)
POTASSIUM SERPL-SCNC: 4.5 MMOL/L (ref 3.6–5.5)
PROT SERPL-MCNC: 6.3 G/DL (ref 6–8.2)
SODIUM SERPL-SCNC: 140 MMOL/L (ref 135–145)
T3FREE SERPL-MCNC: 2.91 PG/ML (ref 2–4.4)
T4 FREE SERPL-MCNC: 1.21 NG/DL (ref 0.93–1.7)
TRIGL SERPL-MCNC: 129 MG/DL (ref 0–149)
TSH SERPL DL<=0.005 MIU/L-ACNC: 1.22 UIU/ML (ref 0.38–5.33)

## 2022-10-12 PROCEDURE — 80061 LIPID PANEL: CPT

## 2022-10-12 PROCEDURE — 82570 ASSAY OF URINE CREATININE: CPT

## 2022-10-12 PROCEDURE — 84481 FREE ASSAY (FT-3): CPT

## 2022-10-12 PROCEDURE — 84439 ASSAY OF FREE THYROXINE: CPT

## 2022-10-12 PROCEDURE — 82306 VITAMIN D 25 HYDROXY: CPT

## 2022-10-12 PROCEDURE — 82043 UR ALBUMIN QUANTITATIVE: CPT

## 2022-10-12 PROCEDURE — 80053 COMPREHEN METABOLIC PANEL: CPT

## 2022-10-12 PROCEDURE — 36415 COLL VENOUS BLD VENIPUNCTURE: CPT

## 2022-10-12 PROCEDURE — 84443 ASSAY THYROID STIM HORMONE: CPT

## 2022-10-12 NOTE — TELEPHONE ENCOUNTER
Marely Majano M.D.  You 2 days ago     I can place a referral to COPD pharm program       Phone Number Called: [phone] (home)     Call outcome: Spoke to patient regarding message below.    Message: pt informed referral placed to COPD PHARMACOTHERAPY SERVICE. They should be able to assist her with Trelegy

## 2022-10-14 ENCOUNTER — OFFICE VISIT (OUTPATIENT)
Dept: ENDOCRINOLOGY | Facility: MEDICAL CENTER | Age: 72
End: 2022-10-14
Attending: INTERNAL MEDICINE
Payer: MEDICARE

## 2022-10-14 VITALS
BODY MASS INDEX: 35.31 KG/M2 | DIASTOLIC BLOOD PRESSURE: 80 MMHG | OXYGEN SATURATION: 100 % | SYSTOLIC BLOOD PRESSURE: 124 MMHG | WEIGHT: 233 LBS | HEART RATE: 84 BPM | HEIGHT: 68 IN

## 2022-10-14 DIAGNOSIS — E03.9 ACQUIRED HYPOTHYROIDISM: ICD-10-CM

## 2022-10-14 DIAGNOSIS — E04.1 RIGHT THYROID NODULE: ICD-10-CM

## 2022-10-14 DIAGNOSIS — E10.65 UNCONTROLLED TYPE 1 DIABETES MELLITUS WITH HYPERGLYCEMIA (HCC): ICD-10-CM

## 2022-10-14 DIAGNOSIS — Z79.4 ENCOUNTER FOR LONG-TERM (CURRENT) USE OF INSULIN (HCC): ICD-10-CM

## 2022-10-14 DIAGNOSIS — E78.5 DYSLIPIDEMIA: ICD-10-CM

## 2022-10-14 DIAGNOSIS — E55.9 VITAMIN D DEFICIENCY: ICD-10-CM

## 2022-10-14 LAB
HBA1C MFR BLD: 6.5 % (ref 0–5.6)
INT CON NEG: ABNORMAL
INT CON POS: ABNORMAL

## 2022-10-14 PROCEDURE — 99212 OFFICE O/P EST SF 10 MIN: CPT | Performed by: INTERNAL MEDICINE

## 2022-10-14 PROCEDURE — 99215 OFFICE O/P EST HI 40 MIN: CPT | Performed by: INTERNAL MEDICINE

## 2022-10-14 PROCEDURE — 83036 HEMOGLOBIN GLYCOSYLATED A1C: CPT | Performed by: INTERNAL MEDICINE

## 2022-10-14 ASSESSMENT — FIBROSIS 4 INDEX: FIB4 SCORE: 1.18

## 2022-10-14 NOTE — PROGRESS NOTES
CHIEF COMPLAINT: Patient is here for follow up of Type 1 Diabetes Mellitus    HPI:     Michelle Espinoza is a 72 y.o. female with Type 1 Diabetes Mellitus here for follow up.    Labs from 10/14/2022 HbA1c is 6.5%      She was diagnosed with type 1 diabetes over 40 years ago.  She was previously on an insulin pump but stopped utilizing it because of cost related issues.  She is currently on multiple daily insulin injections.  Major comorbid issues include COPD predominantly emphysematous,  Hyperlipidemia, hypertension, and primary hypothyroidism.  She is oxygen dependent.        Currently she is on:  Tresiba 44 units daily  fiasp 1 unit for every 5 g (average 8 to 12 units 3 times a day with each meal)  correction scale 1 unit for every 50 greater than 150      She denies side effects with her medications  She is interested in a glucose sensor  She has been checking her sugars 4 times a day for the past 6 months  She has been injecting insulin 4 times a day for the past 6 months  I discussed that I will be sending the freestyle kiet 2 prescription to [facility]      Incidentally she had a CT scan of her chest on October 3, 2022 which showed an incidental thyroid nodule in the posterior portion of the right lobe.  She has not had a formal thyroid ultrasound.  She does have a personal history of breast cancer with prior radiotherapy  She has a family history of Hashimoto's with her daughter  She denies a family history of thyroid cancer          For her hyperlipidemia she is on lovastatin 20 mg daily   She denies any side effects.  LDL cholesterol was 88 on October 2022      She does not have diabetic kidney disease.  She has hypertension and is on captopril 25 mg twice a day  U ACR was less than 30 on October 2022  Her last EGFR was greater than 60 on October 2022        She has an upcoming eye exam appoint with Dr. Watson on November 1, 2022    She denies peripheral neuropathy symptoms        With respect  to her primary hypothyroidism   She is on levothyroxine 112 MCG daily  She reports good compliance  Her TSH was optimal 1.2 on October 12, 2022          BG Diary:10/14/22  Patient forgot her glucose meter    Weight has been stable    Diabetes Complications   Retinopathy: No known retinopathy.  Last eye exam: Patient will have an eye exam on November 1, 2022 [facility]  Neuropathy: Denies paresthesias or numbness in hands or feet. Denies any foot wounds.  Exercise: Minimal.  Diet: Fair.  Patient's medications, allergies, and social histories were reviewed and updated as appropriate.    ROS:     CONS:     No fever, no chills   EYES:     No diplopia, no blurry vision   CV:           No chest pain, no palpitations   PULM:     No SOB, no cough, no hemoptysis.   GI:            No nausea, no vomiting, no diarrhea, no constipation   ENDO:     No polyuria, no polydipsia, no heat intolerance, no cold intolerance       Past Medical History:  Problem List:  2021-09: MICHAELA (obstructive sleep apnea)  2021-09: Bacteremia  2021-08: Sepsis ([facility])  2021-08: Thrombocytopenia ([facility])  2016-12: Influenza A  2016-12: COPD (chronic obstructive pulmonary disease) ([facility])  2016-12: Osteoporosis  2016-12: Pneumonia  2016-12: Acute on chronic respiratory failure with hypoxia ([facility])  2016-10: Hypotension  2016-10: Dehydration  2016-10: Tobacco abuse  2014-11: Insulin pump status  2014-08: Type 1 diabetes mellitus with neurological manifestations,   uncontrolled ([facility])  2014-08: Diabetic polyneuropathy ([facility])  2014-04: Disorder of bone and cartilage  2014-04: Fitting and adjustment of insulin pump  2014-04: Encounter for long-term (current) use of insulin ([facility])  2014-02: Low back pain  2013-09: Hypothyroid  2013-09: Obesity  2013-09: Postoperative anemia due to acute blood loss  2013-09: Hip pain  2013-08: Hypertension  2013-08: Dyslipidemia  2013-08: Vitamin D deficiency  2013-08: Uncontrolled type 1 diabetes mellitus      Past Surgical  "History:  Past Surgical History:   Procedure Laterality Date    HIP ARTHROPLASTY TOTAL  9/9/2013    Performed by Pedro Warren M.D. at SURGERY [facility]    BREAST RECONSTRUCTION  2001    MASTECTOMY  1992    right, full    MASTECTOMY  1987, 2001    partial, left x2        Allergies:  Bactrim [sulfamethoxazole w-trimethoprim] and Percocet [oxycodone-acetaminophen]     Social History:  Social History     Tobacco Use    Smoking status: Every Day     Packs/day: 1.00     Years: 40.00     Pack years: 40.00     Types: Cigarettes    Smokeless tobacco: Never   Vaping Use    Vaping Use: Never used   Substance Use Topics    Alcohol use: Yes     Comment: 2 per day    Drug use: No        Family History:   family history includes Lung Disease in an other family member.      PHYSICAL EXAM:   OBJECTIVE:  Vital signs: /80   Pulse 84   Ht 1.727 m (5' 8\")   Wt 106 kg (233 lb)   SpO2 100%   BMI 35.43 kg/m²   GENERAL: Well-developed, well-nourished in no apparent distress.   EYE:  No ocular asymmetry, PERRLA  HENT: Pink, moist mucous membranes.    NECK: Thyroid exam was difficult as her neck is short and wide  CARDIOVASCULAR:  No murmurs  LUNGS: Clear breath sounds  ABDOMEN: Soft, nontender   EXTREMITIES: No clubbing, cyanosis, or edema.   NEUROLOGICAL: No gross focal motor abnormalities   LYMPH: No cervical adenopathy palpated.   SKIN: No rashes, lesions.     Labs:  Lab Results   Component Value Date/Time    HBA1C 6.5 (A) 10/14/2022 10:28 AM        Lab Results   Component Value Date/Time    WBC 11.9 (H) 09/13/2021 10:10 PM    RBC 3.96 (L) 09/13/2021 10:10 PM    HEMOGLOBIN 12.9 09/13/2021 10:10 PM    MCV 96.0 09/13/2021 10:10 PM    MCH 32.6 09/13/2021 10:10 PM    MCHC 33.9 09/13/2021 10:10 PM    RDW 45.1 09/13/2021 10:10 PM    MPV 10.4 09/13/2021 10:10 PM       Lab Results   Component Value Date/Time    SODIUM 140 10/12/2022 09:25 AM    POTASSIUM 4.5 10/12/2022 09:25 AM    CHLORIDE 103 10/12/2022 09:25 AM    CO2 26 " 10/12/2022 09:25 AM    ANION 11.0 10/12/2022 09:25 AM    GLUCOSE 140 (H) 10/12/2022 09:25 AM    BUN 8 10/12/2022 09:25 AM    CREATININE 0.90 10/12/2022 09:25 AM    CALCIUM 9.0 10/12/2022 09:25 AM    ASTSGOT 29 10/12/2022 09:25 AM    ALTSGPT 22 10/12/2022 09:25 AM    TBILIRUBIN 0.5 10/12/2022 09:25 AM    ALBUMIN 3.9 10/12/2022 09:25 AM    TOTPROTEIN 6.3 10/12/2022 09:25 AM    GLOBULIN 2.4 10/12/2022 09:25 AM    AGRATIO 1.6 10/12/2022 09:25 AM       Lab Results   Component Value Date/Time    CHOLSTRLTOT 187 10/12/2022 0925    TRIGLYCERIDE 129 10/12/2022 0925    HDL 73 10/12/2022 0925    LDL 88 10/12/2022 0925       Lab Results   Component Value Date/Time    MALBCRT see below 10/12/2022 09:25 AM    MICROALBUR <1.2 10/12/2022 09:25 AM        Lab Results   Component Value Date/Time    TSHULTRASEN 1.220 10/12/2022 0925     No results found for: FREEDIR  Lab Results   Component Value Date/Time    FREET3 2.91 10/12/2022 0925     No results found for: THYSTIMIG        ASSESSMENT/PLAN:     1. Uncontrolled type 1 diabetes mellitus with hyperglycemia (HCC)  Improved control   A1c is down to 6.5%  I am ordering a glucose sensor for her  I want her to continue Tresiba 44 units daily  Continue fiasp 1 unit for every 5 g of carbs  Continue same correction scale  She is up-to-date with her labs  I want her to get copies of her eye exam  Follow-up with me in 6 months        2. Acquired hypothyroidism  Controlled  Continue levothyroxine 112 MCG daily  Repeat TSH in 6 months    3. Dyslipidemia  Controlled  Continue lovastatin  Repeat fasting lipids in 6 months    4. Vitamin D deficiency  Stable  Continue monitoring vitamin D levels    5. Encounter for long-term (current) use of insulin (HCC)  Patient is on long-term insulin therapy for type 1 diabetes    6. Right thyroid nodule  Stable  We will schedule for formal thyroid ultrasound the office to evaluate incidental thyroid nodule seen on CT scan        Return in about 6 months (around  4/14/2023).      Thank you kindly for allowing me to participate in the diabetes care plan for this patient.    Keny Robison MD, [facility], [facility]  10/14/22    CC:   Blaire Perez M.D.

## 2022-10-14 NOTE — PROGRESS NOTES
RN-CDE Note    Subjective:   Endocrinology Clinic Progress Note  PCP: Blaire Perez M.D.    HPI:  Michelle Espinoza is a 72 y.o. old patient who is seen today by the Diabetes Nurse Specialist for review of Type 1 Diabetes and Hypothyroidism.  Recent changes in health: Will be going to pulmonary rehab.  DM:   Last A1c:   Lab Results   Component Value Date/Time    HBA1C 6.5 (A) 10/14/2022 10:28 AM      Previous A1c was 7.3 on 5/20/22  A1C GOAL: < 7    Diabetes Medications:   Tresiba 44 units daily  FIASP 1:5 plus 1:50>150 ( 8-12 units TID )    Exercise: Walking as tolerated with oxygen  Diet: Back to eating better since family moved out  Patient's body mass index is 35.43 kg/m². Exercise and nutrition counseling were performed at this visit.    Glucose monitoring frequency: Testing blood sugars 4 times daily  140's  Hypoglycemic episodes: yes - had 1 in at 33 with no symptoms  Last Retinal Exam:  November with [facility]  Daily Foot Exam: Yes   Foot Exam:  Monofilament: done  Monofilament testing with a 10 gram force: sensation intact: intact bilaterally  Visual Inspection: Feet without maceration, ulcers, fissures.  Pedal pulses: intact bilaterally   Lab Results   Component Value Date/Time    MALBCRT see below 10/12/2022 09:25 AM    MICROALBUR <1.2 10/12/2022 09:25 AM        ACR Albumin/Creatinine Ratio goal <30     HTN:   Blood pressure goal <140/<80 .   Currently Rx ACE/ARB: Yes     Dyslipidemia:    Lab Results   Component Value Date/Time    CHOLSTRLTOT 187 10/12/2022 09:25 AM    LDL 88 10/12/2022 09:25 AM    HDL 73 10/12/2022 09:25 AM    TRIGLYCERIDE 129 10/12/2022 09:25 AM         Currently Rx Statin: Yes     She  reports that she has been smoking cigarettes. She has a 40.00 pack-year smoking history. She has never used smokeless tobacco.      Plan:     Discussed and educated on:   - All medications, side effects and compliance (discussed carefully)  - Annual eye examinations at Ophthalmology  - Home  glucose monitoring emphasized  - Weight control and daily exercise    Recommended medication changes: No changes at this time.  Getting patient assistance for her insulin.

## 2022-10-17 ENCOUNTER — PATIENT MESSAGE (OUTPATIENT)
Dept: SLEEP MEDICINE | Facility: MEDICAL CENTER | Age: 72
End: 2022-10-17
Payer: MEDICARE

## 2022-10-17 DIAGNOSIS — J96.11 CHRONIC RESPIRATORY FAILURE WITH HYPOXIA (HCC): ICD-10-CM

## 2022-10-17 DIAGNOSIS — J44.9 CHRONIC OBSTRUCTIVE PULMONARY DISEASE, UNSPECIFIED COPD TYPE (HCC): ICD-10-CM

## 2022-10-17 NOTE — PATIENT COMMUNICATION
[facility]. Patient needs qualifying oxygen testing. Testing will need to be done prior to 11/6/22. Patient last seen 10/6/22. New order will need to be placed and all sent to [facility].

## 2022-10-18 ENCOUNTER — PATIENT MESSAGE (OUTPATIENT)
Dept: SLEEP MEDICINE | Facility: MEDICAL CENTER | Age: 72
End: 2022-10-18
Payer: MEDICARE

## 2022-10-18 DIAGNOSIS — J96.11 CHRONIC RESPIRATORY FAILURE WITH HYPOXIA (HCC): ICD-10-CM

## 2022-10-18 DIAGNOSIS — J44.9 CHRONIC OBSTRUCTIVE PULMONARY DISEASE, UNSPECIFIED COPD TYPE (HCC): ICD-10-CM

## 2022-10-18 NOTE — PATIENT COMMUNICATION
Phone Number Called: [phone] (home)     Call outcome: Spoke to patient regarding message below.    Message: patient scheduled to complete 6mw 10/19/22 10:00 am. Provided address information.

## 2022-10-19 ENCOUNTER — NON-PROVIDER VISIT (OUTPATIENT)
Dept: SLEEP MEDICINE | Facility: MEDICAL CENTER | Age: 72
End: 2022-10-19
Attending: INTERNAL MEDICINE
Payer: MEDICARE

## 2022-10-19 DIAGNOSIS — J44.9 CHRONIC OBSTRUCTIVE PULMONARY DISEASE, UNSPECIFIED COPD TYPE (HCC): ICD-10-CM

## 2022-10-19 DIAGNOSIS — J96.11 CHRONIC RESPIRATORY FAILURE WITH HYPOXIA (HCC): ICD-10-CM

## 2022-10-19 PROCEDURE — 94618 PULMONARY STRESS TESTING: CPT | Performed by: INTERNAL MEDICINE

## 2022-10-19 ASSESSMENT — 6 MINUTE WALK TEST (6MWT)
TOTAL REST TIME: 180
COMMENTS: ROOM AIR
PERCEIVED FATIGUE AT 6 MIN: 0
AMBULATES WITH O2: WITHOUT O2
PERCEIVED BREATHLESSNESS AT 4 MIN: 5
PERCEIVED BREATHLESSNESS AT 1 MIN: 0.5
SAO2 AT 3 MIN: 83
PERCEIVED FATIGUE AT 2 MIN: 0
PERCEIVED FATIGUE AT 5 MIN: 0
COMMENTS: UPPED 02 TO 3 LPM
PERCEIVED BREATHLESSNESS AT 5 MIN: 5
SAO2 AT 5 MIN: 90
HEART RATE: 72
HEART RATE AT 5 MIN: 82
PERCEIVED BREATHLESSNESS AT 3 MIN: 4
NUMBER OF RESTS: 3
COMMENTS: ADDED 02 TO 2 LPM
HEART RATE AT 2 MIN: 90
HEART RATE AT 3 MIN: 94
SAO2 AT 6 MIN: 91
HEART RATE AT 4 MIN: 94
PERCEIVED BREATHLESSNESS AT 2 MIN: 3
PERCEIVED FATIGUE AT 1 MIN: 0
PERCEIVED BREATHLESSNESS AT 6 MIN: 5
SAO2 AT 1 MIN: 90
HEART RATE AT 1 MIN: 117
HEART RATE AT 2 MIN: 92
SAO2 AT 1 MIN: 91
PERCEIVED FATIGUE AT 1 MIN: 0
PERCENT OF NORMAL WALKED: 29
SITTING BLOOD PRESSURE: 128/74
PERCEIVED BREATHLESSNESS AT 2 MIN: 5
BLOOD PRESSURE: RIGHT ARM
PERCEIVED FATIGUE AT 4 MIN: 0
HEART RATE AT 1 MIN: 89
PERCEIVED FATIGUE AT 2 MIN: 0
COMMENTS: UPPED 02 TO 4 LPM
BLOOD PRESSURE AT 1 MIN: 140/89
O2 SAT PERCENT ROOM AIR: 92
SAO2 AT 2 MIN: 90
SAO2 AT 2 MIN: 87
HEART RATE AT 6 MIN: 84
PERCEIVED BREATHLESSNESS AT 1 MIN: 5
SAO2 AT 4 MIN: 86

## 2022-10-19 NOTE — PROCEDURES
Test started on Room Air in minute 2 added 02 to 2 lpm in minute 3 upped 02 to 3 lpm and in minute 4 upped 02 to 4 lpm patient finished test on 4 lpm 02.    6-minute walk test    At baseline patient's oxygen saturation was 92% on room air with a blood pressure of 128/74 and a heart rate of 72.  Patient was able to ambulate for 1 minute prior to oxygen desaturation to 87%.  Patient was then placed on 2 L per nasal cannula of supplemental oxygen.  She ambulated for 1 more minute, desaturated to 83%, and was placed on 3 L per nasal cannula.  Patient ambulated for one 1 minute until desaturating to 86% and was placed on 4 L/min per nasal cannula.  Patient was then able to complete the study on 4 L of oxygen per nasal cannula with ending saturation of 91% and a heart rate of 84.  Patient needed to stop 3 times for 60 seconds each time during her study.  Total distance traveled was 360 feet (120 m).  Supplemental oxygen was required at 4 L/min for significant exertion    Ethel Correia MD RD  Pulmonary and Critical Care    Available on [facility]

## 2022-10-19 NOTE — PATIENT COMMUNICATION
6mw completed patient requires oxygen please place update oxygen order to continue oxygen needs.     4 LPM

## 2022-11-14 ENCOUNTER — DOCUMENTATION (OUTPATIENT)
Dept: VASCULAR LAB | Facility: MEDICAL CENTER | Age: 72
End: 2022-11-14
Payer: MEDICARE

## 2022-11-14 NOTE — PROGRESS NOTES
[facility]      Called and left msg for pt to call back to establish care regarding pharmacotherapy referral for COPD medication affordability from Dr. Marely Majano on 10/10/22.      LVM requesting patient call the clinic back to further discuss PAP for Trelegy and if she will financially qualify for available programs.    Will reach out to patient again at a later time.      Insurance: [facility]  PCP: non-[facility]   Locations to be seen:  Pul clinic     Phone number left for return call or any questions or concerns.  [facility] at [phone], fax [phone]      Elijah DentD

## 2022-11-15 NOTE — PROGRESS NOTES
Pt returned our call. Below is the copay for the triple inhalers    Trelegy $190  Breztri $92    Pt states she will meet income requirement for Breztri PAP. Scheduled appt for 11/21 - pt to bring proof of income    Katya Gilliam, PharmD

## 2022-11-21 ENCOUNTER — NON-PROVIDER VISIT (OUTPATIENT)
Dept: SLEEP MEDICINE | Facility: MEDICAL CENTER | Age: 72
End: 2022-11-21
Payer: MEDICARE

## 2022-11-21 DIAGNOSIS — J44.9 CHRONIC OBSTRUCTIVE PULMONARY DISEASE, UNSPECIFIED COPD TYPE (HCC): ICD-10-CM

## 2022-11-21 PROCEDURE — 99213 OFFICE O/P EST LOW 20 MIN: CPT | Performed by: INTERNAL MEDICINE

## 2022-11-28 ENCOUNTER — TELEPHONE (OUTPATIENT)
Dept: VASCULAR LAB | Facility: MEDICAL CENTER | Age: 72
End: 2022-11-28
Payer: MEDICARE

## 2022-12-19 ENCOUNTER — PATIENT MESSAGE (OUTPATIENT)
Dept: SLEEP MEDICINE | Facility: MEDICAL CENTER | Age: 72
End: 2022-12-19
Payer: MEDICARE

## 2022-12-19 DIAGNOSIS — J96.11 CHRONIC RESPIRATORY FAILURE WITH HYPOXIA (HCC): ICD-10-CM

## 2023-02-01 ENCOUNTER — TELEMEDICINE (OUTPATIENT)
Dept: SLEEP MEDICINE | Facility: MEDICAL CENTER | Age: 73
End: 2023-02-01
Payer: MEDICARE

## 2023-02-01 DIAGNOSIS — J44.9 OSA AND COPD OVERLAP SYNDROME (HCC): Primary | ICD-10-CM

## 2023-02-01 DIAGNOSIS — G47.33 OSA AND COPD OVERLAP SYNDROME (HCC): Primary | ICD-10-CM

## 2023-02-01 DIAGNOSIS — J96.11 CHRONIC RESPIRATORY FAILURE WITH HYPOXIA (HCC): ICD-10-CM

## 2023-02-01 PROCEDURE — 99213 OFFICE O/P EST LOW 20 MIN: CPT | Mod: 95 | Performed by: STUDENT IN AN ORGANIZED HEALTH CARE EDUCATION/TRAINING PROGRAM

## 2023-02-01 NOTE — PROGRESS NOTES
[facility] Sleep Center Virtual Follow Up Note     Date: 2023 / Time: 2:40 PM    CC: Telemedicine sleep follow-up    This sleep consultation is provided using Telemedicine and secure encrypted software. Consent was obtained.    Consulting MD: Matias Talbot M.D.  Requesting Physician: Blaire Perez M.D.  Patient name:      Michelle Espinoza  : 1950  MRN: [id number]     This visit was conducted via Zoom using secure and encrypted videoconferencing technology.   The patient was in a private location [place] [facility]    The patient's identity was confirmed and verbal consent was obtained for this virtual visit.      HISTORY OF PRESENT ILLNESS:     Michelle Espinoza is a 72 y.o. female with COPD, chronic respiratory failure on supplemental oxygen, hypothyroidism, diabetes mellitus, osteoporosis, and obstructive sleep apnea on BiPAP with supplemental oxygen.  Presents to Sleep Clinic for yearly follow-up regarding management of sleep apnea.     She reports using her BiPAP machine with supplemental oxygen nightly.  She is using supplemental oxygen 24 hours a day and follows with pulmonology.  She states she has increased her flow rate to 6 L/minute.  She is using 6 L/minute all the time.    States she is sleeping with the head of the bed slightly elevated.  Overall finds her mask and pressures comfortable.  She is getting supplies regularly.  Currently has no acute complaints.    DME provider: [facility]  Device: Aircurve 10   Mask: nasal   Aerophagia: No   Snoring: No   Dry mouth: No   Leak: No   Skin irritation: No   Chin strap: No         Sleep History  2021 PSG showed mild obstructive sleep apnea overall 4% AHI 12.27, minimum oxygen saturation 79%.  2022 PSG titration study showed improvement with PAP therapy and she did best with BiPAP EPAP 7 IPAP 11, there was persistent nocturnal hypoxia with Pap therapy and was recommended she continue supplemental oxygen  with Pap therapy      MEDICAL HISTORY  Past Medical History:   Diagnosis Date    Anesthesia     PONV    Arthritis     ASTHMA     Cancer (HCC)     history of breast cancer    Chickenpox     Cough     Dental disorder     dentures, full upper and lower    Diabetes     insulin pump    Diabetic neuropathy (HCC)     EMPHYSEMA     Encounter for long-term (current) use of insulin ([facility]) 04/23/2014    Epilepsy ([facility])     Fitting and adjustment of insulin pump 04/23/2014    Heart burn     Mumps     osteopenia 04/23/2014    Pain     hip    Psychiatric problem     depression    Shortness of breath     Sleep apnea     CPAP    Unspecified disorder of thyroid     hypothyroid    Wheezing         SURGICAL HISTORY  Past Surgical History:   Procedure Laterality Date    HIP ARTHROPLASTY TOTAL  9/9/2013    Performed by Pedro Warren M.D. at SURGERY [facility]    BREAST RECONSTRUCTION  2001    MASTECTOMY  1992    right, full    MASTECTOMY  1987, 2001    partial, left x2        FAMILY HISTORY  Family History   Problem Relation Age of Onset    Lung Disease Other        SOCIAL HISTORY  Social History     Socioeconomic History    Marital status:    Tobacco Use    Smoking status: Every Day     Packs/day: 1.00     Years: 40.00     Pack years: 40.00     Types: Cigarettes    Smokeless tobacco: Never   Vaping Use    Vaping Use: Never used   Substance and Sexual Activity    Alcohol use: Yes     Comment: 2 per day    Drug use: No        CURRENT MEDICATIONS  Current Outpatient Medications   Medication Sig Dispense Refill    Budeson-Glycopyrrol-Formoterol (BREZTRI AEROSPHERE) 160-9-4.8 MCG/ACT Aerosol Inhale 2 Puffs 2 times a day. 10.7 g 6    Continuous Blood Gluc  (FREESTYLE MIKEY 2 READER) Device 1 Each continuous. 1 Each 1    Continuous Blood Gluc Sensor (FREESTYLE MIKEY 2 SENSOR) Misc 1 Each every 14 days. 2 Each 11    captopril (CAPOTEN) 25 MG Tab Take 25 mg by mouth 2 times a day.      levothyroxine (SYNTHROID) 112 MCG Tab  Take 1 Tablet by mouth every morning on an empty stomach. 90 Tablet 2    celecoxib (CELEBREX) 200 MG Cap Take 200 mg by mouth every day.      albuterol (PROVENTIL) 2.5mg/3ml Nebu Soln solution for nebulization       Multiple Vitamins-Minerals (CENTRUM SILVER 50+WOMEN) Tab Take 1 Tablet by mouth every day.      albuterol 108 (90 Base) MCG/ACT Aero Soln inhalation aerosol Inhale 1-2 Puffs every four hours as needed for Shortness of Breath.      Insulin Degludec (TRESIBA FLEXTOUCH) 100 UNIT/ML Solution Pen-injector Inject 44 Units under the skin every day.      esomeprazole (NEXIUM) 20 MG capsule Take 20 mg by mouth 2 times a day.      montelukast (SINGULAIR) 10 MG Tab Take 10 mg by mouth every day.      Insulin Aspart, w/Niacinamide, 100 UNIT/ML Solution Inject 8-15 Units under the skin 3 times a day before meals. 1 unit per 5 grams of carbs  Pt takes with snacks as well as 3 times a day      Cyanocobalamin (VITAMIN B 12 PO) Take 1 Tablet by mouth every day.      Cholecalciferol (VITAMIN D) 2000 UNIT Tab Take 4,000 Units by mouth every day.      ipratropium-albuterol (DUONEB) 0.5-2.5 (3) MG/3ML nebulizer solution 3 mL by Nebulization route every 6 hours as needed for Shortness of Breath. 25 Bullet 0    cetirizine (ZYRTEC) 10 MG Tab Take 10 mg by mouth 2 times a day.      B Complex Cap Take 1 Cap by mouth every day.      fluoxetine (PROZAC) 20 MG CAPS Take 20 mg by mouth every day.      raloxifene (EVISTA) 60 MG Tab Take 60 mg by mouth every day.      aspirin EC (ECOTRIN) 81 MG TBEC Take 81 mg by mouth every day.      lovastatin (MEVACOR) 20 MG TABS Take 20 mg by mouth every evening.       No current facility-administered medications for this visit.       REVIEW OF SYSTEMS  Constitutional: Denies fevers, Denies weight changes  Ears/Nose/Throat/Mouth: Denies nasal congestion or sore throat   Cardiovascular: Denies chest pain or palpitations   Respiratory: Chronic shortness of breath, Denies  cough  Gastrointestinal/Hepatic: Denies abdominal pain, nausea, vomiting, diarrhea  Musculoskeletal/Rheum: Denies  joint pain and swelling   Sleep: See HPI      Physical Examination:  Vitals/ General Appearance:   Weight/BMI: There is no height or weight on file to calculate BMI.  There were no vitals taken for this visit.  There were no vitals filed for this visit.    General: alert and oriented to time, place and person. Cooperative and in no apparent distress.   Constitutional: Alert, no distress, well-groomed.  Voice: normal volume and orville  Head: normocephalic   Pulmonary: Voice normal volume and orville. No sounds or signs of increased work of breathing.   Neurologic: No involuntary movements     Assessment and Plan  Michelle Espinoza is a 72 y.o. female with COPD, chronic respiratory failure on supplemental oxygen, hypothyroidism, diabetes mellitus, osteoporosis, and obstructive sleep apnea on BiPAP with supplemental oxygen.  Presents to Sleep Clinic for yearly follow-up regarding management of sleep apnea.  Obstructive sleep apnea    The medical record was reviewed.    Diagnostic and titration nocturnal polysomnogram's, home sleep apnea tests, continuous nocturnal oximetry results, multiple sleep latency tests, and compliance reports reviewed.  Compliance data reviewed showing 100% usage > 4hours in last 30  days. Average AHI 0.9 events/hour.  Currently on fixed BiPAP 11/7 with supplemental oxygen. Pt continues to use and benefit from machine.        PLAN:   -Order placed for supply renewal   -Advised to reach out via MyChart with any questions     Has been advised to continue the current BiPAP with supplemental oxygen, clean equipment frequently, and get new mask and supplies as allowed by insurance and DME. Recommend an earlier appointment, if significant treatment barriers develop.  Patients with MICHAELA are at increased risk of cardiovascular disease including coronary artery disease, systemic arterial  hypertension, pulmonary arterial hypertension, cardiac arrythmias, and stroke. The patient was advised to avoid driving a motor vehicle when drowsy.    Positive airway pressure will favorably impact many of the adverse conditions and effects provoked by MICHAELA.    Have advised the patient to follow up with the appropriate healthcare practitioners for all other medical problems and issues.    Return in about 1 year (around 2/1/2024).      Please note portions of this record was created using voice recognition software. I have made every reasonable attempt to correct obvious errors, but I expect that there are errors of grammar and possibly content I did not discover before finalizing the note.

## 2023-02-13 ENCOUNTER — APPOINTMENT (OUTPATIENT)
Dept: ENDOCRINOLOGY | Facility: MEDICAL CENTER | Age: 73
End: 2023-02-13
Attending: INTERNAL MEDICINE
Payer: MEDICARE

## 2023-02-14 ENCOUNTER — PATIENT MESSAGE (OUTPATIENT)
Dept: SLEEP MEDICINE | Facility: MEDICAL CENTER | Age: 73
End: 2023-02-14
Payer: MEDICARE

## 2023-02-17 ENCOUNTER — PATIENT MESSAGE (OUTPATIENT)
Dept: SLEEP MEDICINE | Facility: MEDICAL CENTER | Age: 73
End: 2023-02-17
Payer: MEDICARE

## 2023-02-17 RX ORDER — PREDNISONE 10 MG/1
TABLET ORAL
Qty: 18 TABLET | Refills: 0 | Status: SHIPPED | OUTPATIENT
Start: 2023-02-17 | End: 2023-04-10

## 2023-02-17 RX ORDER — AZITHROMYCIN 250 MG/1
TABLET, FILM COATED ORAL
Qty: 6 TABLET | Refills: 0 | Status: SHIPPED | OUTPATIENT
Start: 2023-02-17 | End: 2023-04-10

## 2023-02-17 RX ORDER — ALBUTEROL SULFATE 90 UG/1
1-2 AEROSOL, METERED RESPIRATORY (INHALATION) EVERY 4 HOURS PRN
Qty: 8.5 G | Refills: 6 | Status: SHIPPED | OUTPATIENT
Start: 2023-02-17

## 2023-03-03 ENCOUNTER — DOCUMENTATION (OUTPATIENT)
Dept: VASCULAR LAB | Facility: MEDICAL CENTER | Age: 73
End: 2023-03-03
Payer: MEDICARE

## 2023-03-03 NOTE — PROGRESS NOTES
[facility] Pharmacotherapy Clinic for [facility] Pulmonary Medicine        Called patient today to follow up with her and to ensure she is getting the Breztri Inhaler shipped directly to her from the  via PAP from [employer].   LVM requesting she call the clinic back to update me on her current status or with any questions or concerns.     Will follow up with the patient again at a later time.     Elijah DentD

## 2023-04-10 ENCOUNTER — TELEMEDICINE (OUTPATIENT)
Dept: SLEEP MEDICINE | Facility: MEDICAL CENTER | Age: 73
End: 2023-04-10
Attending: INTERNAL MEDICINE
Payer: MEDICARE

## 2023-04-10 VITALS
DIASTOLIC BLOOD PRESSURE: 76 MMHG | HEIGHT: 68 IN | OXYGEN SATURATION: 98 % | BODY MASS INDEX: 35.43 KG/M2 | HEART RATE: 68 BPM | SYSTOLIC BLOOD PRESSURE: 169 MMHG

## 2023-04-10 DIAGNOSIS — G47.33 OSA (OBSTRUCTIVE SLEEP APNEA): ICD-10-CM

## 2023-04-10 DIAGNOSIS — J96.11 CHRONIC RESPIRATORY FAILURE WITH HYPOXIA (HCC): ICD-10-CM

## 2023-04-10 DIAGNOSIS — Z72.0 TOBACCO USE: ICD-10-CM

## 2023-04-10 DIAGNOSIS — J44.9 CHRONIC OBSTRUCTIVE PULMONARY DISEASE, UNSPECIFIED COPD TYPE (HCC): ICD-10-CM

## 2023-04-10 PROCEDURE — 99214 OFFICE O/P EST MOD 30 MIN: CPT | Mod: 95 | Performed by: INTERNAL MEDICINE

## 2023-04-10 RX ORDER — PREDNISONE 10 MG/1
TABLET ORAL
Qty: 30 TABLET | Refills: 0 | Status: SHIPPED | OUTPATIENT
Start: 2023-04-10 | End: 2023-04-14

## 2023-04-10 RX ORDER — AZITHROMYCIN 250 MG/1
TABLET, FILM COATED ORAL
Qty: 6 TABLET | Refills: 0 | Status: SHIPPED | OUTPATIENT
Start: 2023-04-10 | End: 2023-04-14

## 2023-04-10 ASSESSMENT — ENCOUNTER SYMPTOMS
NECK PAIN: 0
ORTHOPNEA: 0
SINUS PAIN: 0
PHOTOPHOBIA: 0
BACK PAIN: 0
NAUSEA: 0
CONSTIPATION: 0
SORE THROAT: 0
STRIDOR: 0
HEADACHES: 0
DEPRESSION: 0
FEVER: 0
WHEEZING: 0
COUGH: 0
SPUTUM PRODUCTION: 0
SPEECH CHANGE: 0
HEMOPTYSIS: 0
PALPITATIONS: 0
CLAUDICATION: 0
HEARTBURN: 0
EYE PAIN: 0
MYALGIAS: 0
TREMORS: 0
DIAPHORESIS: 0
VOMITING: 0
DOUBLE VISION: 0
DIARRHEA: 0
FALLS: 0
BLURRED VISION: 0
SHORTNESS OF BREATH: 1
CHILLS: 0
ABDOMINAL PAIN: 0
PND: 0
DIZZINESS: 0
WEIGHT LOSS: 0
WEAKNESS: 0
FOCAL WEAKNESS: 0
EYE DISCHARGE: 0
EYE REDNESS: 0

## 2023-04-10 NOTE — PROGRESS NOTES
Chief Complaint   Patient presents with    COPD     Last seen 10/6/22      This evaluation was conducted via Zoom using secure and encrypted videoconferencing technology. The patient was in their home in the [facility].    The patient's identity was confirmed and verbal consent was obtained for this virtual visit.     HPI: This patient is a 72 y.o. female whom is followed in our clinic for COPD c/b chronic hypoxemic respiratory failure last seen by me on 10/6/22.  Past medical history is significant for type 1 diabetes, hypothyroid, dyslipidemia, MICHAELA on CPAP followed by sleep medicine. She is a current tobacco smoker with greater than 40-pack-year history currently smoking just under a pack per day.  She has been on Spiriva and Qvar in the past and we transitioned her to Trelegy 100. She also uses nebulized bronchodilators up to 4x daily. O2 needs have increased over the past 18 months to now 6LPM which she is using at all time. She tells me today she has not been out of the house in 3 months. Functionally she is MMRC 3-4. She was hospitalized for pneumonia and E. coli bacteremia in late August and early September of 2021  CT chest at that time showed emphysematous changes with patchy groundglass infiltrates bilaterally involving both upper and lower lobes. We ordered repeat CT which showed clearance of infiltrate but also showed R thyroid nodule 15 mm in size and hypoattenuation of the liver and focal densisities more prominent than on prior CT. PFTs from 10/2022 showed severe air flow obstruction with FEV1 post BD of 36% pred, +BD response, normal TLC at 106% pred, air trapping with RV of 164% pred and DLCO of 57% pred.  She was tx for acute exacerbation on February but overall has become severely limited due to SOB and O2 dependence. She was referred to pulmonary rehab but did not receive VM that was reportedly left for her on 1/3. She does not think she could drive twice weekly to [email or web address] but would consider  virtual program.    Past Medical History:   Diagnosis Date    Anesthesia     PONV    Arthritis     ASTHMA     Cancer ([facility])     history of breast cancer    Chickenpox     Cough     Dental disorder     dentures, full upper and lower    Diabetes     insulin pump    Diabetic neuropathy ([facility])     EMPHYSEMA     Encounter for long-term (current) use of insulin ([facility]) 04/23/2014    Epilepsy ([facility])     Fitting and adjustment of insulin pump 04/23/2014    Heart burn     Mumps     osteopenia 04/23/2014    Pain     hip    Psychiatric problem     depression    Shortness of breath     Sleep apnea     CPAP    Unspecified disorder of thyroid     hypothyroid    Wheezing        Social History     Socioeconomic History    Marital status:      Spouse name: Not on file    Number of children: Not on file    Years of education: Not on file    Highest education level: Not on file   Occupational History    Not on file   Tobacco Use    Smoking status: Every Day     Packs/day: 1.00     Years: 40.00     Pack years: 40.00     Types: Cigarettes    Smokeless tobacco: Never   Vaping Use    Vaping Use: Never used   Substance and Sexual Activity    Alcohol use: Yes     Comment: 2 per day    Drug use: No    Sexual activity: Not on file   Other Topics Concern    Not on file   Social History Narrative    Not on file     Social Determinants of Health     Financial Resource Strain: Not on file   Food Insecurity: Not on file   Transportation Needs: Not on file   Physical Activity: Not on file   Stress: Not on file   Social Connections: Not on file   Intimate Partner Violence: Not on file   Housing Stability: Not on file       Family History   Problem Relation Age of Onset    Lung Disease Other        Current Outpatient Medications on File Prior to Visit   Medication Sig Dispense Refill    albuterol 108 (90 Base) MCG/ACT Aero Soln inhalation aerosol Inhale 1-2 Puffs every four hours as needed for Shortness of Breath. 8.5 g 6     Budeson-Glycopyrrol-Formoterol (BREZTRI AEROSPHERE) 160-9-4.8 MCG/ACT Aerosol Inhale 2 Puffs 2 times a day. 10.7 g 6    Continuous Blood Gluc  (FREESTYLE MIKEY 2 READER) Device 1 Each continuous. 1 Each 1    Continuous Blood Gluc Sensor (FREESTYLE MIKEY 2 SENSOR) Misc 1 Each every 14 days. 2 Each 11    captopril (CAPOTEN) 25 MG Tab Take 25 mg by mouth 2 times a day.      levothyroxine (SYNTHROID) 112 MCG Tab Take 1 Tablet by mouth every morning on an empty stomach. 90 Tablet 2    celecoxib (CELEBREX) 200 MG Cap Take 200 mg by mouth every day.      albuterol (PROVENTIL) 2.5mg/3ml Nebu Soln solution for nebulization       Multiple Vitamins-Minerals (CENTRUM SILVER 50+WOMEN) Tab Take 1 Tablet by mouth every day.      Insulin Degludec (TRESIBA FLEXTOUCH) 100 UNIT/ML Solution Pen-injector Inject 44 Units under the skin every day.      esomeprazole (NEXIUM) 20 MG capsule Take 20 mg by mouth 2 times a day.      montelukast (SINGULAIR) 10 MG Tab Take 10 mg by mouth every day.      Insulin Aspart, w/Niacinamide, 100 UNIT/ML Solution Inject 8-15 Units under the skin 3 times a day before meals. 1 unit per 5 grams of carbs  Pt takes with snacks as well as 3 times a day      Cyanocobalamin (VITAMIN B 12 PO) Take 1 Tablet by mouth every day.      Cholecalciferol (VITAMIN D) 2000 UNIT Tab Take 4,000 Units by mouth every day.      ipratropium-albuterol (DUONEB) 0.5-2.5 (3) MG/3ML nebulizer solution 3 mL by Nebulization route every 6 hours as needed for Shortness of Breath. 25 Bullet 0    cetirizine (ZYRTEC) 10 MG Tab Take 10 mg by mouth 2 times a day.      B Complex Cap Take 1 Cap by mouth every day.      fluoxetine (PROZAC) 20 MG CAPS Take 20 mg by mouth every day.      raloxifene (EVISTA) 60 MG Tab Take 60 mg by mouth every day.      aspirin EC (ECOTRIN) 81 MG TBEC Take 81 mg by mouth every day.      lovastatin (MEVACOR) 20 MG TABS Take 20 mg by mouth every evening.      predniSONE (DELTASONE) 10 MG Tab Take 30mg x 3  "days, then take 20mg x 3 days, then take 10mg x 3 days, with food, then discontinue. 18 Tablet 0    azithromycin (ZITHROMAX) 250 MG Tab Take 2 tablets on day 1, then take 1 tablet a day for 4 days. 6 Tablet 0     No current facility-administered medications on file prior to visit.       Bactrim [sulfamethoxazole w-trimethoprim] and Percocet [oxycodone-acetaminophen]      ROS:   Review of Systems   Constitutional:  Negative for chills, diaphoresis, fever, malaise/fatigue and weight loss.   HENT:  Negative for congestion, ear discharge, ear pain, hearing loss, nosebleeds, sinus pain, sore throat and tinnitus.    Eyes:  Negative for blurred vision, double vision, photophobia, pain, discharge and redness.   Respiratory:  Positive for shortness of breath. Negative for cough, hemoptysis, sputum production, wheezing and stridor.    Cardiovascular:  Negative for chest pain, palpitations, orthopnea, claudication, leg swelling and PND.   Gastrointestinal:  Negative for abdominal pain, constipation, diarrhea, heartburn, nausea and vomiting.   Genitourinary:  Negative for dysuria and urgency.   Musculoskeletal:  Negative for back pain, falls, joint pain, myalgias and neck pain.   Skin:  Negative for itching and rash.   Neurological:  Negative for dizziness, tremors, speech change, focal weakness, weakness and headaches.   Endo/Heme/Allergies:  Negative for environmental allergies.   Psychiatric/Behavioral:  Negative for depression.      BP (!) 169/76 (BP Location: Right arm, Patient Position: Sitting, BP Cuff Size: Adult)   Pulse 68   Ht 1.727 m (5' 8\")   SpO2 98%   Physical Exam  Vitals reviewed.   Constitutional:       General: She is not in acute distress.     Appearance: Normal appearance. She is obese.   HENT:      Head: Normocephalic and atraumatic.      Right Ear: External ear normal.      Left Ear: External ear normal.      Nose: Nose normal. No congestion.      Mouth/Throat:      Mouth: Mucous membranes are moist. "      Pharynx: Oropharynx is clear.   Eyes:      General: No scleral icterus.     Extraocular Movements: Extraocular movements intact.      Conjunctiva/sclera: Conjunctivae normal.      Pupils: Pupils are equal, round, and reactive to light.   Neurological:      Mental Status: She is alert and oriented to person, place, and time.      Cranial Nerves: No cranial nerve deficit.   Psychiatric:         Mood and Affect: Mood normal.         Behavior: Behavior normal.       PFTs as reviewed by me personally: as per HPI    Imaging as reviewed by me personally:  as per hPI    Assessment:  1. Chronic obstructive pulmonary disease, unspecified COPD type (HCC)  predniSONE (DELTASONE) 10 MG Tab    azithromycin (ZITHROMAX) 250 MG Tab      2. Chronic respiratory failure with hypoxia (HCC)  predniSONE (DELTASONE) 10 MG Tab    azithromycin (ZITHROMAX) 250 MG Tab      3. MICHAELA (obstructive sleep apnea)        4. Tobacco use            Plan:  Chronic, severe and progressive. Continue trelegy and nebulized bronchodilators. On cpap already at night. Emergency pred/abx refilled. I strongly encouraged her to call pulmonary rehab to see about virtual program and counseled on tobacco cessation  Chronic and 2/2 COPD. Pt is compliant with and benefiting from O2 at 6lPM  On CPAP and followed by sleep medicine  Counseled on tobacco cessation; no concerning nodules 10/22; discuss lung Ca screening next visit if she wants to continue  Return in about 5 months (around 9/10/2023).

## 2023-04-11 ENCOUNTER — HOSPITAL ENCOUNTER (OUTPATIENT)
Dept: LAB | Facility: MEDICAL CENTER | Age: 73
End: 2023-04-11
Attending: INTERNAL MEDICINE
Payer: MEDICARE

## 2023-04-11 DIAGNOSIS — Z79.4 ENCOUNTER FOR LONG-TERM (CURRENT) USE OF INSULIN (HCC): ICD-10-CM

## 2023-04-11 DIAGNOSIS — E78.5 DYSLIPIDEMIA: ICD-10-CM

## 2023-04-11 DIAGNOSIS — E03.9 ACQUIRED HYPOTHYROIDISM: ICD-10-CM

## 2023-04-11 DIAGNOSIS — E10.65 UNCONTROLLED TYPE 1 DIABETES MELLITUS WITH HYPERGLYCEMIA (HCC): ICD-10-CM

## 2023-04-11 DIAGNOSIS — E55.9 VITAMIN D DEFICIENCY: ICD-10-CM

## 2023-04-11 LAB
25(OH)D3 SERPL-MCNC: 66 NG/ML (ref 30–100)
ALBUMIN SERPL BCP-MCNC: 4 G/DL (ref 3.2–4.9)
ALBUMIN/GLOB SERPL: 1.4 G/DL
ALP SERPL-CCNC: 105 U/L (ref 30–99)
ALT SERPL-CCNC: 24 U/L (ref 2–50)
ANION GAP SERPL CALC-SCNC: 10 MMOL/L (ref 7–16)
AST SERPL-CCNC: 22 U/L (ref 12–45)
BILIRUB SERPL-MCNC: 0.4 MG/DL (ref 0.1–1.5)
BUN SERPL-MCNC: 12 MG/DL (ref 8–22)
CALCIUM ALBUM COR SERPL-MCNC: 9.1 MG/DL (ref 8.5–10.5)
CALCIUM SERPL-MCNC: 9.1 MG/DL (ref 8.5–10.5)
CHLORIDE SERPL-SCNC: 102 MMOL/L (ref 96–112)
CHOLEST SERPL-MCNC: 206 MG/DL (ref 100–199)
CO2 SERPL-SCNC: 25 MMOL/L (ref 20–33)
CREAT SERPL-MCNC: 0.85 MG/DL (ref 0.5–1.4)
FASTING STATUS PATIENT QL REPORTED: NORMAL
GFR SERPLBLD CREATININE-BSD FMLA CKD-EPI: 73 ML/MIN/1.73 M 2
GLOBULIN SER CALC-MCNC: 2.8 G/DL (ref 1.9–3.5)
GLUCOSE SERPL-MCNC: 129 MG/DL (ref 65–99)
HDLC SERPL-MCNC: 71 MG/DL
LDLC SERPL CALC-MCNC: 112 MG/DL
POTASSIUM SERPL-SCNC: 4.4 MMOL/L (ref 3.6–5.5)
PROT SERPL-MCNC: 6.8 G/DL (ref 6–8.2)
SODIUM SERPL-SCNC: 137 MMOL/L (ref 135–145)
T4 FREE SERPL-MCNC: 1.11 NG/DL (ref 0.93–1.7)
TRIGL SERPL-MCNC: 117 MG/DL (ref 0–149)
TSH SERPL DL<=0.005 MIU/L-ACNC: 2.58 UIU/ML (ref 0.38–5.33)

## 2023-04-11 PROCEDURE — 80061 LIPID PANEL: CPT

## 2023-04-11 PROCEDURE — 84439 ASSAY OF FREE THYROXINE: CPT

## 2023-04-11 PROCEDURE — 36415 COLL VENOUS BLD VENIPUNCTURE: CPT

## 2023-04-11 PROCEDURE — 82306 VITAMIN D 25 HYDROXY: CPT

## 2023-04-11 PROCEDURE — 84443 ASSAY THYROID STIM HORMONE: CPT

## 2023-04-11 PROCEDURE — 80053 COMPREHEN METABOLIC PANEL: CPT

## 2023-04-11 PROCEDURE — 82570 ASSAY OF URINE CREATININE: CPT

## 2023-04-11 PROCEDURE — 82043 UR ALBUMIN QUANTITATIVE: CPT

## 2023-04-12 LAB
CREAT UR-MCNC: 38.03 MG/DL
MICROALBUMIN UR-MCNC: <1.2 MG/DL
MICROALBUMIN/CREAT UR: NORMAL MG/G (ref 0–30)

## 2023-04-14 ENCOUNTER — OFFICE VISIT (OUTPATIENT)
Dept: ENDOCRINOLOGY | Facility: MEDICAL CENTER | Age: 73
End: 2023-04-14
Attending: INTERNAL MEDICINE
Payer: MEDICARE

## 2023-04-14 DIAGNOSIS — E78.5 DYSLIPIDEMIA: ICD-10-CM

## 2023-04-14 DIAGNOSIS — Z79.4 ENCOUNTER FOR LONG-TERM (CURRENT) USE OF INSULIN (HCC): ICD-10-CM

## 2023-04-14 DIAGNOSIS — E03.9 ACQUIRED HYPOTHYROIDISM: ICD-10-CM

## 2023-04-14 DIAGNOSIS — E10.9 CONTROLLED DIABETES MELLITUS TYPE 1 WITHOUT COMPLICATIONS (HCC): ICD-10-CM

## 2023-04-14 DIAGNOSIS — E55.9 VITAMIN D DEFICIENCY: ICD-10-CM

## 2023-04-14 DIAGNOSIS — E04.1 RIGHT THYROID NODULE: ICD-10-CM

## 2023-04-14 LAB
HBA1C MFR BLD: 6.9 % (ref ?–5.8)
POCT INT CON NEG: NEGATIVE
POCT INT CON POS: POSITIVE

## 2023-04-14 PROCEDURE — 99214 OFFICE O/P EST MOD 30 MIN: CPT | Performed by: INTERNAL MEDICINE

## 2023-04-14 PROCEDURE — 83036 HEMOGLOBIN GLYCOSYLATED A1C: CPT | Performed by: INTERNAL MEDICINE

## 2023-04-14 PROCEDURE — 95251 CONT GLUC MNTR ANALYSIS I&R: CPT | Performed by: INTERNAL MEDICINE

## 2023-04-14 ASSESSMENT — FIBROSIS 4 INDEX: FIB4 SCORE: 0.86

## 2023-04-14 ASSESSMENT — PATIENT HEALTH QUESTIONNAIRE - PHQ9: CLINICAL INTERPRETATION OF PHQ2 SCORE: 0

## 2023-04-14 NOTE — PROGRESS NOTES
Monofilament testing with a 10 gram force: sensation intact: decreased bilaterally  Visual Inspection: Feet without maceration, ulcers, fissures.  Pedal pulses: intact bilaterally

## 2023-04-14 NOTE — PROGRESS NOTES
CHIEF COMPLAINT: Patient is here for follow up of Type 1 Diabetes Mellitus    HPI:     Michelle Espinoza is a 72 y.o. female with Type 1 Diabetes Mellitus here for follow up.        Labs from 4/14/2023 Hba1c is 6.9%  Labs from 10/14/2022 HbA1c is 6.5%      She was diagnosed with type 1 diabetes over 40 years ago.  She was previously on an insulin pump but stopped utilizing it because of cost related issues.  She is currently on multiple daily insulin injections.  Major comorbid issues include COPD predominantly emphysematous,  Hyperlipidemia, hypertension, and primary hypothyroidism.  She is oxygen dependent.  She has been wearing a CGM for over 6 months          Currently she is on:  Tresiba 48 units daily  fiasp 1 unit for every 5 g (average 8 to 12 units 3 times a day with each meal)  correction scale 1 unit for every 50 greater than 150      She denies side effects with her medications  Download of her CGM shows an avwrage BG of 150 with TIR in 77%      Incidentally she had a CT scan of her chest on October 3, 2022 which showed an incidental thyroid nodule in the posterior portion of the right lobe.   I scheduled her for an US but she missed it due to a respiratory infection        For her hyperlipidemia she is on lovastatin 20 mg daily   She denies any side effects.  LDL cholesterol was 112 on 4/2023      She does not have diabetic kidney disease.  She has hypertension and is on captopril 25 mg twice a day  U ACR was less than 30 on 4/2023  Her last EGFR was greater than 60 on 4.2023      We are getting an eye exam today     She denies peripheral neuropathy symptoms        With respect to her primary hypothyroidism   She is on levothyroxine 112 MCG daily  She reports good compliance  TSH is on 4/2023 on 2.5  Her TSH was optimal 1.2 on October 12, 2022          BG Diary:  Patient forgot her glucose meter    Weight has been stable    Diabetes Complications   Retinopathy: No known retinopathy.  Last eye exam:  Patient will have an eye exam on November 1, 2022 at [facility]  Neuropathy: Denies paresthesias or numbness in hands or feet. Denies any foot wounds.  Exercise: Minimal.  Diet: Fair.  Patient's medications, allergies, and social histories were reviewed and updated as appropriate.    ROS:     CONS:     No fever, no chills   EYES:     No diplopia, no blurry vision   CV:           No chest pain, no palpitations   PULM:     No SOB, no cough, no hemoptysis.   GI:            No nausea, no vomiting, no diarrhea, no constipation   ENDO:     No polyuria, no polydipsia, no heat intolerance, no cold intolerance       Past Medical History:  Problem List:  2021-09: MICHAELA (obstructive sleep apnea)  2021-09: Bacteremia  2021-08: Sepsis ([facility])  2021-08: Thrombocytopenia ([facility])  2016-12: Influenza A  2016-12: COPD (chronic obstructive pulmonary disease) ([facility])  2016-12: Osteoporosis  2016-12: Pneumonia  2016-12: Acute on chronic respiratory failure with hypoxia ([facility])  2016-10: Hypotension  2016-10: Dehydration  2016-10: Tobacco abuse  2014-11: Insulin pump status  2014-08: Type 1 diabetes mellitus with neurological manifestations,   uncontrolled ([facility])  2014-08: Diabetic polyneuropathy ([facility])  2014-04: Disorder of bone and cartilage  2014-04: Fitting and adjustment of insulin pump  2014-04: Encounter for long-term (current) use of insulin ([facility])  2014-02: Low back pain  2013-09: Acquired hypothyroidism  2013-09: Obesity  2013-09: Postoperative anemia due to acute blood loss  2013-09: Hip pain  2013-08: Hypertension  2013-08: Dyslipidemia  2013-08: Vitamin D deficiency  2013-08: Uncontrolled type 1 diabetes mellitus      Past Surgical History:  Past Surgical History:   Procedure Laterality Date    HIP ARTHROPLASTY TOTAL  9/9/2013    Performed by Pedro Warren M.D. at [facility]    BREAST RECONSTRUCTION  2001    MASTECTOMY  1992    right, full    MASTECTOMY  1987, 2001    partial, left x2        Allergies:  Bactrim  "[sulfamethoxazole w-trimethoprim] and Percocet [oxycodone-acetaminophen]     Social History:  Social History     Tobacco Use    Smoking status: Every Day     Packs/day: 1.00     Years: 40.00     Pack years: 40.00     Types: Cigarettes    Smokeless tobacco: Never   Vaping Use    Vaping Use: Never used   Substance Use Topics    Alcohol use: Yes     Comment: 2 per day    Drug use: No        Family History:   family history includes Lung Disease in an other family member.      PHYSICAL EXAM:   OBJECTIVE:  Vital signs: /70   Pulse 80   Ht 1.727 m (5' 8\")   Wt 125 kg (275 lb)   SpO2 95%   BMI 41.81 kg/m²   GENERAL: Well-developed, well-nourished in no apparent distress.   EYE:  No ocular asymmetry, PERRLA  HENT: Pink, moist mucous membranes.    NECK: Thyroid exam was difficult as her neck is short and wide  CARDIOVASCULAR:  No murmurs  LUNGS: Clear breath sounds  ABDOMEN: Soft, nontender   EXTREMITIES: No clubbing, cyanosis, or edema.   NEUROLOGICAL: No gross focal motor abnormalities   LYMPH: No cervical adenopathy palpated.   SKIN: No rashes, lesions.     Labs:  Lab Results   Component Value Date/Time    HBA1C 6.9 (A) 04/14/2023 01:13 PM        Lab Results   Component Value Date/Time    WBC 11.9 (H) 09/13/2021 10:10 PM    RBC 3.96 (L) 09/13/2021 10:10 PM    HEMOGLOBIN 12.9 09/13/2021 10:10 PM    MCV 96.0 09/13/2021 10:10 PM    MCH 32.6 09/13/2021 10:10 PM    MCHC 33.9 09/13/2021 10:10 PM    RDW 45.1 09/13/2021 10:10 PM    MPV 10.4 09/13/2021 10:10 PM       Lab Results   Component Value Date/Time    SODIUM 137 04/11/2023 09:09 AM    POTASSIUM 4.4 04/11/2023 09:09 AM    CHLORIDE 102 04/11/2023 09:09 AM    CO2 25 04/11/2023 09:09 AM    ANION 10.0 04/11/2023 09:09 AM    GLUCOSE 129 (H) 04/11/2023 09:09 AM    BUN 12 04/11/2023 09:09 AM    CREATININE 0.85 04/11/2023 09:09 AM    CALCIUM 9.1 04/11/2023 09:09 AM    ASTSGOT 22 04/11/2023 09:09 AM    ALTSGPT 24 04/11/2023 09:09 AM    TBILIRUBIN 0.4 04/11/2023 09:09 AM "    ALBUMIN 4.0 04/11/2023 09:09 AM    TOTPROTEIN 6.8 04/11/2023 09:09 AM    GLOBULIN 2.8 04/11/2023 09:09 AM    AGRATIO 1.4 04/11/2023 09:09 AM       Lab Results   Component Value Date/Time    CHOLSTRLTOT 187 10/12/2022 0925    TRIGLYCERIDE 129 10/12/2022 0925    HDL 73 10/12/2022 0925    LDL 88 10/12/2022 0925       Lab Results   Component Value Date/Time    MALBCRT see below 04/11/2023 09:08 AM    MICROALBUR <1.2 04/11/2023 09:08 AM        Lab Results   Component Value Date/Time    TSHULTRASEN 1.220 10/12/2022 0925     No results found for: FREEDIR  Lab Results   Component Value Date/Time    FREET3 2.91 10/12/2022 0925     No results found for: THYSTIMIG        ASSESSMENT/PLAN:     1. Uncontrolled type 1 diabetes mellitus with hyperglycemia (HCC)  Stable control  A1c 6.9%  Her CGM was downloaded and reviewed  Increase Tresiba to 50 units daily  Continue fiasp 1 unit for every 5 g of carbs  Continue same correction scale  She is up-to-date with her labs  We are getting an eye exam today  I am going to see her in the office in 3 months for an ultrasound to evaluate the thyroid nodule that was seen on CT scan      2. Acquired hypothyroidism  Controlled  Continue levothyroxine 112 MCG daily  Repeat TSH in 6 months    3. Dyslipidemia  Controlled  Continue lovastatin  Repeat fasting lipids in 12 months    4. Vitamin D deficiency  Stable  Continue monitoring vitamin D levels    5. Encounter for long-term (current) use of insulin (HCC)  Patient is on long-term insulin therapy for type 1 diabetes    6. Right thyroid nodule  Stable  We will schedule for formal thyroid ultrasound the office to evaluate incidental thyroid nodule seen on CT scan        Return in about 3 months (around 7/14/2023).      Thank you kindly for allowing me to participate in the diabetes care plan for this patient.    Keny Robison MD, FACE, NU      CC:   Blaire Perez M.D.

## 2023-04-17 VITALS
OXYGEN SATURATION: 95 % | HEART RATE: 80 BPM | DIASTOLIC BLOOD PRESSURE: 70 MMHG | WEIGHT: 235 LBS | HEIGHT: 68 IN | SYSTOLIC BLOOD PRESSURE: 120 MMHG | BODY MASS INDEX: 35.61 KG/M2

## 2023-06-02 ENCOUNTER — DOCUMENTATION (OUTPATIENT)
Dept: VASCULAR LAB | Facility: MEDICAL CENTER | Age: 73
End: 2023-06-02
Payer: MEDICARE

## 2023-06-02 NOTE — PROGRESS NOTES
[facility] for [facility] Pulmonary Medicine        Received communication form [employer] requesting that a new rx (with wet signature) for a refill on Breztri be sent in for the patient to be able to obtain the medication.   This was completed and faxed today.       Elijah DentD

## 2023-08-09 PROBLEM — J96.11 CHRONIC RESPIRATORY FAILURE WITH HYPOXIA (HCC): Status: ACTIVE | Noted: 2023-08-09

## 2023-08-17 DIAGNOSIS — E10.65 UNCONTROLLED TYPE 1 DIABETES MELLITUS WITH HYPERGLYCEMIA (HCC): ICD-10-CM

## 2023-09-07 ENCOUNTER — TELEPHONE (OUTPATIENT)
Dept: HEALTH INFORMATION MANAGEMENT | Facility: OTHER | Age: 73
End: 2023-09-07
Payer: MEDICARE

## 2023-09-20 ENCOUNTER — TELEMEDICINE (OUTPATIENT)
Dept: SLEEP MEDICINE | Facility: MEDICAL CENTER | Age: 73
End: 2023-09-20
Attending: INTERNAL MEDICINE
Payer: MEDICARE

## 2023-09-20 VITALS
WEIGHT: 227 LBS | HEART RATE: 66 BPM | HEIGHT: 68 IN | DIASTOLIC BLOOD PRESSURE: 67 MMHG | OXYGEN SATURATION: 96 % | SYSTOLIC BLOOD PRESSURE: 133 MMHG | BODY MASS INDEX: 34.4 KG/M2

## 2023-09-20 DIAGNOSIS — J44.9 CHRONIC OBSTRUCTIVE PULMONARY DISEASE, UNSPECIFIED COPD TYPE (HCC): ICD-10-CM

## 2023-09-20 DIAGNOSIS — J96.11 CHRONIC RESPIRATORY FAILURE WITH HYPOXIA (HCC): ICD-10-CM

## 2023-09-20 DIAGNOSIS — G47.33 OSA (OBSTRUCTIVE SLEEP APNEA): ICD-10-CM

## 2023-09-20 DIAGNOSIS — Z72.0 TOBACCO USE: ICD-10-CM

## 2023-09-20 PROCEDURE — 99213 OFFICE O/P EST LOW 20 MIN: CPT | Mod: 95 | Performed by: INTERNAL MEDICINE

## 2023-09-20 RX ORDER — AZITHROMYCIN 250 MG/1
TABLET, FILM COATED ORAL
Qty: 6 TABLET | Refills: 0 | Status: SHIPPED | OUTPATIENT
Start: 2023-09-20

## 2023-09-20 RX ORDER — PREDNISONE 10 MG/1
TABLET ORAL
Qty: 18 TABLET | Refills: 0 | Status: SHIPPED | OUTPATIENT
Start: 2023-09-20 | End: 2024-01-24

## 2023-09-20 RX ORDER — PREDNISONE 10 MG/1
10 TABLET ORAL DAILY
COMMUNITY
End: 2023-09-20

## 2023-09-20 ASSESSMENT — ENCOUNTER SYMPTOMS
CONSTIPATION: 0
CLAUDICATION: 0
COUGH: 1
ORTHOPNEA: 0
MYALGIAS: 0
DEPRESSION: 0
TREMORS: 0
FEVER: 0
PHOTOPHOBIA: 0
DOUBLE VISION: 0
WEIGHT LOSS: 0
SORE THROAT: 0
VOMITING: 0
SPUTUM PRODUCTION: 1
SHORTNESS OF BREATH: 1
FOCAL WEAKNESS: 0
EYE PAIN: 0
NAUSEA: 0
EYE DISCHARGE: 0
HEMOPTYSIS: 0
FALLS: 0
PALPITATIONS: 0
PND: 0
BACK PAIN: 0
WEAKNESS: 0
DIZZINESS: 0
ABDOMINAL PAIN: 0
HEARTBURN: 0
DIARRHEA: 0
EYE REDNESS: 0
DIAPHORESIS: 0
SPEECH CHANGE: 0
STRIDOR: 0
SINUS PAIN: 0
HEADACHES: 0
BLURRED VISION: 0
CHILLS: 0
WHEEZING: 0
NECK PAIN: 0

## 2023-09-20 NOTE — PROGRESS NOTES
Chief Complaint   Patient presents with    Follow-Up     LAST SEEN 4/10/23     This evaluation was conducted via Zoom using secure and encrypted videoconferencing technology. The patient was in their home in the [facility].    The patient's identity was confirmed and verbal consent was obtained for this virtual visit.     HPI: This patient is a 72 y.o. female whom is followed in our clinic for copd c/b  last seen by me on 4/10/23.  PMHx is significant for type 1 diabetes, hypothyroid, dyslipidemia, MICHAELA on CPAP followed by sleep medicine. She is a current tobacco smoker with greater than 40-pack-year history currently smoking just under a pack per day. She has been on Spiriva and Qvar in the past and we transitioned her to Trelegy 100. She also uses nebulized bronchodilators up to 4x daily. O2 needs have stabilized since our last visit in April but increased over the past 2 years to 5-6 LPM.  She was hospitalized for pneumonia and E. coli bacteremia in late August and early September of 2021  CT chest at that time showed emphysematous changes with patchy groundglass infiltrates bilaterally involving both upper and lower lobes. We ordered repeat CT which showed clearance of infiltrate but also showed R thyroid nodule 15 mm in size and hypoattenuation of the liver and focal densisities more prominent than on prior CT. PFTs from 10/2022 showed severe air flow obstruction with FEV1 post BD of 36% pred, +BD response, normal TLC at 106% pred, air trapping with RV of 164% pred and DLCO of 57% pred. She was tx for acute exacerbation in February and is on emergency azithromcyin and prednisone now. She is on the wait list for virtual pulmonary rehab. She has declined any f/u chest imaging or lung Ca screening.     Past Medical History:   Diagnosis Date    Anesthesia     PONV    Arthritis     ASTHMA     Cancer (HCC)     history of breast cancer    Chickenpox     Cough     Dental disorder     dentures, full upper and lower     Diabetes     insulin pump    Diabetic neuropathy (HCC)     EMPHYSEMA     Encounter for long-term (current) use of insulin (HCC) 04/23/2014    Epilepsy (HCC)     Fitting and adjustment of insulin pump 04/23/2014    Heart burn     Mumps     osteopenia 04/23/2014    Pain     hip    Psychiatric problem     depression    Shortness of breath     Sleep apnea     CPAP    Unspecified disorder of thyroid     hypothyroid    Wheezing        Social History     Socioeconomic History    Marital status:      Spouse name: Not on file    Number of children: Not on file    Years of education: Not on file    Highest education level: Not on file   Occupational History    Not on file   Tobacco Use    Smoking status: Every Day     Current packs/day: 1.00     Average packs/day: 1 pack/day for 40.0 years (40.0 ttl pk-yrs)     Types: Cigarettes    Smokeless tobacco: Never   Vaping Use    Vaping Use: Never used   Substance and Sexual Activity    Alcohol use: Yes     Comment: 2 per day    Drug use: No    Sexual activity: Not on file   Other Topics Concern    Not on file   Social History Narrative    Not on file     Social Determinants of Health     Financial Resource Strain: Not on file   Food Insecurity: Not on file   Transportation Needs: Not on file   Physical Activity: Not on file   Stress: Not on file   Social Connections: Not on file   Intimate Partner Violence: Not on file   Housing Stability: Not on file       Family History   Problem Relation Age of Onset    Lung Disease Other        Current Outpatient Medications on File Prior to Visit   Medication Sig Dispense Refill    Continuous Blood Gluc Sensor (FREESTYLE MIKEY 2 SENSOR) [facility] [place]Y 1 SENSOR AND USE TO CHECK BLOOD GLUCOSE CONTINUOUSLY, CHANGE EVERY 14 DAYS 2 Each 11    albuterol 108 (90 Base) MCG/ACT Aero Soln inhalation aerosol Inhale 1-2 Puffs every four hours as needed for Shortness of Breath. 8.5 g 6    Budeson-Glycopyrrol-Formoterol (BREZTRI AEROSPHERE) 160-9-4.8  MCG/ACT Aerosol Inhale 2 Puffs 2 times a day. 10.7 g 6    Continuous Blood Gluc  (FREESTYLE MIKEY 2 READER) Device 1 Each continuous. 1 Each 1    captopril (CAPOTEN) 25 MG Tab Take 25 mg by mouth 2 times a day.      levothyroxine (SYNTHROID) 112 MCG Tab Take 1 Tablet by mouth every morning on an empty stomach. 90 Tablet 2    celecoxib (CELEBREX) 200 MG Cap Take 200 mg by mouth every day.      albuterol (PROVENTIL) 2.5mg/3ml Nebu Soln solution for nebulization       Multiple Vitamins-Minerals (CENTRUM SILVER 50+WOMEN) Tab Take 1 Tablet by mouth every day.      Insulin Degludec (TRESIBA FLEXTOUCH) 100 UNIT/ML Solution Pen-injector Inject 50 Units under the skin every day.      esomeprazole (NEXIUM) 20 MG capsule Take 20 mg by mouth 2 times a day.      montelukast (SINGULAIR) 10 MG Tab Take 10 mg by mouth every day.      Insulin Aspart, w/Niacinamide, 100 UNIT/ML Solution Inject 8-15 Units under the skin 3 times a day before meals. 1 unit per 5 grams of carbs  Pt takes with snacks as well as 3 times a day      Cyanocobalamin (VITAMIN B 12 PO) Take 1 Tablet by mouth every day.      Cholecalciferol (VITAMIN D) 2000 UNIT Tab Take 4,000 Units by mouth every day.      ipratropium-albuterol (DUONEB) 0.5-2.5 (3) MG/3ML nebulizer solution 3 mL by Nebulization route every 6 hours as needed for Shortness of Breath. 25 Bullet 0    cetirizine (ZYRTEC) 10 MG Tab Take 10 mg by mouth 2 times a day.      B Complex Cap Take 1 Cap by mouth every day.      fluoxetine (PROZAC) 20 MG CAPS Take 20 mg by mouth every day.      raloxifene (EVISTA) 60 MG Tab Take 60 mg by mouth every day.      aspirin EC (ECOTRIN) 81 MG TBEC Take 81 mg by mouth every day.      lovastatin (MEVACOR) 20 MG TABS Take 20 mg by mouth every evening.       No current facility-administered medications on file prior to visit.       Bactrim [sulfamethoxazole w-trimethoprim] and Percocet [oxycodone-acetaminophen]      ROS:   Review of Systems   Constitutional:   "Negative for chills, diaphoresis, fever, malaise/fatigue and weight loss.   HENT:  Negative for congestion, ear discharge, ear pain, hearing loss, nosebleeds, sinus pain, sore throat and tinnitus.    Eyes:  Negative for blurred vision, double vision, photophobia, pain, discharge and redness.   Respiratory:  Positive for cough, sputum production and shortness of breath. Negative for hemoptysis, wheezing and stridor.    Cardiovascular:  Negative for chest pain, palpitations, orthopnea, claudication, leg swelling and PND.   Gastrointestinal:  Negative for abdominal pain, constipation, diarrhea, heartburn, nausea and vomiting.   Genitourinary:  Negative for dysuria and urgency.   Musculoskeletal:  Negative for back pain, falls, joint pain, myalgias and neck pain.   Skin:  Negative for itching and rash.   Neurological:  Negative for dizziness, tremors, speech change, focal weakness, weakness and headaches.   Endo/Heme/Allergies:  Negative for environmental allergies.   Psychiatric/Behavioral:  Negative for depression.        /67 (BP Location: Right arm, Patient Position: Sitting, BP Cuff Size: Adult)   Pulse 66   Ht 1.727 m (5' 8\")   Wt 103 kg (227 lb)   SpO2 96%   Physical Exam  Constitutional:       General: She is not in acute distress.     Appearance: Normal appearance.   HENT:      Head: Normocephalic and atraumatic.      Right Ear: External ear normal.      Left Ear: External ear normal.      Nose: Nose normal. No congestion.      Mouth/Throat:      Mouth: Mucous membranes are moist.      Pharynx: Oropharynx is clear. No oropharyngeal exudate.   Eyes:      General: No scleral icterus.     Extraocular Movements: Extraocular movements intact.      Conjunctiva/sclera: Conjunctivae normal.      Pupils: Pupils are equal, round, and reactive to light.   Neurological:      Mental Status: She is alert and oriented to person, place, and time.      Cranial Nerves: No cranial nerve deficit.   Psychiatric:         " Mood and Affect: Mood normal.         Behavior: Behavior normal.       PFTs as reviewed by me personally: as per hPI    Imaging as reviewed by me personally:  as per hpI    Assessment:  1. Chronic obstructive pulmonary disease, unspecified COPD type (HCC)  azithromycin (ZITHROMAX) 250 MG Tab    predniSONE (DELTASONE) 10 MG Tab      2. Chronic respiratory failure with hypoxia (HCC)        3. MICHAELA (obstructive sleep apnea)        4. Tobacco use            Plan:  Chronic, severe and progressive. Continue trelegy, prn COREY. Counseled on tobacco cessation  Chronic and 2/2 COPD; pending virtual pulmonary rehab; pt is compliant with and benefiting from O2 up to 6LPM  On CPAP  Counseled on tobacco cessation  Return in about 6 months (around 3/20/2024).

## 2023-11-02 NOTE — PROGRESS NOTES
Subjective:         Patient ID: Imani Britton is a 52 y.o. female.    Chief Complaint: Low-back Pain      Pain  This is a chronic problem. The current episode started more than 1 year ago. The problem occurs daily. The problem has been gradually improving. Associated symptoms include arthralgias. Pertinent negatives include no anorexia, change in bowel habit, chest pain, chills, coughing, diaphoresis, fever, neck pain, rash, sore throat, swollen glands, urinary symptoms, vertigo, visual change or vomiting.     Review of Systems   Constitutional:  Negative for activity change, appetite change, chills, diaphoresis, fever and unexpected weight change.   HENT:  Negative for drooling, ear discharge, ear pain, facial swelling, nosebleeds, sore throat, trouble swallowing, voice change and goiter.    Eyes:  Negative for photophobia, pain, discharge, redness and visual disturbance.   Respiratory:  Negative for apnea, cough, choking, chest tightness, shortness of breath, wheezing and stridor.    Cardiovascular:  Negative for chest pain, palpitations and leg swelling.   Gastrointestinal:  Negative for abdominal distention, anorexia, change in bowel habit, diarrhea, rectal pain, vomiting and fecal incontinence.   Endocrine: Negative for cold intolerance, heat intolerance, polydipsia, polyphagia and polyuria.   Genitourinary:  Negative for bladder incontinence, dysuria, flank pain, frequency and hot flashes.   Musculoskeletal:  Positive for arthralgias, back pain and leg pain. Negative for neck pain.   Integumentary:  Negative for color change, pallor and rash.   Allergic/Immunologic: Negative for immunocompromised state.   Neurological:  Negative for dizziness, vertigo, seizures, syncope, facial asymmetry, speech difficulty, light-headedness, memory loss and coordination difficulties.   Hematological:  Negative for adenopathy. Does not bruise/bleed easily.   Psychiatric/Behavioral:  Negative for agitation, behavioral  "Patient Consult Note    Informed written consent was given on: 11/21/22    Pulmonologist: Dr. Marely Majano    Reason for consult: Initial COPD pharmacotherapy appointment to help with cost of COPD meds    HPI:  Michelle Espinoza is a 72 y.o. old patient who comes in today for evaluation of above stated problem.    Baseline spirometry and severity of airflow limitation: FEV1/FVC ratio 41 and FEV1 of 0.75 L or 30% predicted.  COPD control/exacerbation history:  6 months ago  Family History: Mom passed from COPD    Stage B, FEV1 30%  CAT 30, Exacerbations 0  Most recent exacerbation >6 mo ago    COPD Medication History and Current Regimen  Breztri 2P BID  Albuterol Inh prn  Nebulizer with Duoneb Q6H     Patient does have a COREY inhaler prescribed    Previous COPD medications (and reason for discontinuation)  Spiriva- ineffective  Qvar - ineffective      History of and potential current adverse events  No adverse effects from current regimen    Adherence assessment:  Patient is adherent with current therapy, including correct administration technique.     Cost of medications - currently unaffordable- Trelegy was originally $47/mo and now she is in donut hole and it costs >$100/month which is unaffordable.     Laboratory Values  Lab Results   Component Value Date/Time    ALKPHOSPHAT 114 (H) 10/12/2022 09:25 AM    ASTSGOT 29 10/12/2022 09:25 AM    ALTSGPT 22 10/12/2022 09:25 AM       Lifestyle factors  Diet/exercise: common adult / no exercise as she \"loses her breath doing the most minor of things\"  Tobacco: current smoker (10 ciggs/day)- not ready to quit. Tried in the past.     Other environmental allergens/pollution: No hot tub; no wood burning stove, no mining work, and no asbestos exposure      Patient education:  Counseled patient regarding all current COPD medications  Discussed importance of adherence with COPD therapy  Educated on signs and symptoms which was suggest clinical complications and adverse " "Cristiana Leong MD;  Location: [facility];  effects    Immunization  Immunization History   Administered Date(s) Administered    INFLUENZA TIV (IM) 11/14/2013, 10/01/2016    Influenza (IM) Preservative Free - HISTORICAL DATA 10/14/2011, 12/13/2015    Influenza Seasonal Injectable - Historical Data 10/01/2012, 11/14/2013, 10/01/2016    Influenza Vaccine Adult HD 11/17/2014, 09/27/2016, 09/23/2017, 10/01/2018, 10/09/2019, 09/12/2020, 12/01/2021, 10/06/2022    Influenza Vaccine H1N1 - HISTORICAL DATA 12/12/2009    Influenza Vaccine Pediatric Split - Historical Data 12/09/2005    PFIZER PURPLE CAP SARS-COV-2 VACCINATION (12+) 02/17/2021, 03/06/2021, 12/01/2021    Pneumococcal Conjugate Vaccine (Prevnar/PCV-13) 12/13/2015    Pneumococcal polysaccharide vaccine (PPSV-23) 09/27/2016, 10/01/2016    Tdap Vaccine 12/13/2015    Zoster Vaccine Live (ZVL) (Zostavax) - HISTORICAL DATA 01/16/2014    Zoster Vaccine Recombinant (RZV) (SHINGRIX) 09/12/2020, 12/14/2020     Patient is current with influenza & pneumococcal vaccinations    Assessment/Plan  Patient presented in clinic today and we discussed her current treatment with Breztri. She is doing well on this medication, but still uses her albuterol rescue inhaler more than twice daily. This is in addition to her nebulizer treatments with Duoneb that she does Q6H throughout the day.   Patient is using 5L of O2 supposed to be just during exertion, and 3L at rest but reports she rarely turns down her concentrator and she would be changing it back and forth all day.   She reports she has been coughing out more phlegm than usual.   Patient is a current smoker. She smokes ~ 10 + cigg/day. She has tried to quit in the past and was unsuccessful. She is not ready to try again as her  current smoker as well which she reports makes it almost impossible for her to try and quit. Recommend she at least cut down on the amount of cigg she smokes daily. Encouraged the patient to contact [address] [place] [address] for helpful tips and tools to    help both her and her  quit.   She feels the Breztri inhaler works better than the Trelegy for her.   We filled out the PAP forms for [employer] to get Breztri free of cost. Application will be faxed and scanned into media.   Patient has 2 weeks left of current sample of Breztri. She will come by the main clinic to  more samples so she will not run out before we hear determination of PAP.       Elijah Murillo  PharmD  11/21/22    CC:   Dr. Marely Majano      [facility] Consent  ?  Name Michelle Espinoza MRN number: [id number]  the following are guidelines for participation in the [facility].   I, ____Michelle Espinoza_____, understand and voluntarily agree to participate in the [facility] and to have services provided to me by pharmacists working in collaboration with my provider.  I understand the pharmacist within the [facility] may initiate, modify or discontinue my medications, order appropriate testing and appointments, perform exams, monitor treatment, and make clinical evaluations and decisions pursuant to a collaborative practice agreement with my provider.  I understand the pharmacist within the [facility] is not a physician, osteopathic physician, advanced practice registered nurse or physician assistant and may not diagnose.  I will take all my medications as instructed and not change the way I take it without first talking to my provider or a pharmacist within the [facility].  I understand that if I am late to my appointment I may not be able to be seen by a pharmacist at that time and will have to reschedule my appointment.  During appointment with pharmacist I understand that pharmacist has the right not to answer questions or perform services outside the pharmacist’s scope of practice.  By signing below, I provide informed consent for the  pharmacist to provide these services and for my participation in the [facility].   ?  Michelle Espinoza [id number] 11/21/22  Patient Name???? MRN number Date  ?  ____Obtained verbal consent from pt, No signature due to COVID concerns___?  Patient Signature  ?     [facility] Dr. Salcedo    

## 2023-11-27 ENCOUNTER — TELEPHONE (OUTPATIENT)
Dept: ENDOCRINOLOGY | Facility: MEDICAL CENTER | Age: 73
End: 2023-11-27
Payer: MEDICARE

## 2023-11-28 DIAGNOSIS — E10.65 UNCONTROLLED TYPE 1 DIABETES MELLITUS WITH HYPERGLYCEMIA (HCC): ICD-10-CM

## 2024-01-05 ENCOUNTER — PATIENT MESSAGE (OUTPATIENT)
Dept: SLEEP MEDICINE | Facility: MEDICAL CENTER | Age: 74
End: 2024-01-05
Payer: MEDICARE

## 2024-01-05 DIAGNOSIS — J45.41 MODERATE PERSISTENT ASTHMA WITH EXACERBATION: ICD-10-CM

## 2024-01-05 DIAGNOSIS — J44.1 COPD EXACERBATION (HCC): ICD-10-CM

## 2024-01-10 RX ORDER — IPRATROPIUM BROMIDE AND ALBUTEROL SULFATE 2.5; .5 MG/3ML; MG/3ML
3 SOLUTION RESPIRATORY (INHALATION) EVERY 6 HOURS PRN
Qty: 120 ML | Refills: 6 | Status: SHIPPED | OUTPATIENT
Start: 2024-01-10

## 2024-01-22 ENCOUNTER — PROCEDURE VISIT (OUTPATIENT)
Dept: ENDOCRINOLOGY | Facility: MEDICAL CENTER | Age: 74
End: 2024-01-22
Attending: INTERNAL MEDICINE
Payer: MEDICARE

## 2024-01-22 DIAGNOSIS — E04.1 THYROID NODULE: ICD-10-CM

## 2024-01-22 DIAGNOSIS — E03.9 ACQUIRED HYPOTHYROIDISM: ICD-10-CM

## 2024-01-22 PROCEDURE — 76536 US EXAM OF HEAD AND NECK: CPT | Performed by: INTERNAL MEDICINE

## 2024-01-22 NOTE — PROGRESS NOTES
Thyroid US done on this procedure visit  She has a suspicious solid nodule on RLL   It is taller than wide and measures 1.2 cm   Will schedule for FNA  Please see dictated POC US report completed by endocrinologist  Patient advised to follow up as planned with labs prior    Keny Robison M.D.

## 2024-01-24 ENCOUNTER — OFFICE VISIT (OUTPATIENT)
Dept: ENDOCRINOLOGY | Facility: MEDICAL CENTER | Age: 74
End: 2024-01-24
Attending: INTERNAL MEDICINE
Payer: MEDICARE

## 2024-01-24 ENCOUNTER — PHARMACY VISIT (OUTPATIENT)
Dept: PHARMACY | Facility: MEDICAL CENTER | Age: 74
End: 2024-01-24
Payer: COMMERCIAL

## 2024-01-24 VITALS
HEIGHT: 68 IN | HEART RATE: 91 BPM | OXYGEN SATURATION: 96 % | BODY MASS INDEX: 36.15 KG/M2 | SYSTOLIC BLOOD PRESSURE: 130 MMHG | DIASTOLIC BLOOD PRESSURE: 80 MMHG | WEIGHT: 238.5 LBS

## 2024-01-24 DIAGNOSIS — Z79.4 ENCOUNTER FOR LONG-TERM (CURRENT) USE OF INSULIN (HCC): ICD-10-CM

## 2024-01-24 DIAGNOSIS — E04.1 THYROID NODULE: ICD-10-CM

## 2024-01-24 DIAGNOSIS — E78.5 DYSLIPIDEMIA: ICD-10-CM

## 2024-01-24 DIAGNOSIS — E55.9 VITAMIN D DEFICIENCY: ICD-10-CM

## 2024-01-24 DIAGNOSIS — E10.65 UNCONTROLLED TYPE 1 DIABETES MELLITUS WITH HYPERGLYCEMIA (HCC): ICD-10-CM

## 2024-01-24 DIAGNOSIS — E03.9 ACQUIRED HYPOTHYROIDISM: ICD-10-CM

## 2024-01-24 DIAGNOSIS — F32.0 CURRENT MILD EPISODE OF MAJOR DEPRESSIVE DISORDER WITHOUT PRIOR EPISODE (HCC): ICD-10-CM

## 2024-01-24 LAB
HBA1C MFR BLD: 7.2 % (ref ?–5.8)
POCT INT CON NEG: NEGATIVE
POCT INT CON POS: POSITIVE

## 2024-01-24 PROCEDURE — 3075F SYST BP GE 130 - 139MM HG: CPT | Performed by: INTERNAL MEDICINE

## 2024-01-24 PROCEDURE — 3079F DIAST BP 80-89 MM HG: CPT | Performed by: INTERNAL MEDICINE

## 2024-01-24 PROCEDURE — 83036 HEMOGLOBIN GLYCOSYLATED A1C: CPT | Performed by: INTERNAL MEDICINE

## 2024-01-24 PROCEDURE — 95251 CONT GLUC MNTR ANALYSIS I&R: CPT | Performed by: INTERNAL MEDICINE

## 2024-01-24 PROCEDURE — 99213 OFFICE O/P EST LOW 20 MIN: CPT | Performed by: INTERNAL MEDICINE

## 2024-01-24 PROCEDURE — RXMED WILLOW AMBULATORY MEDICATION CHARGE: Performed by: INTERNAL MEDICINE

## 2024-01-24 PROCEDURE — 99214 OFFICE O/P EST MOD 30 MIN: CPT | Performed by: INTERNAL MEDICINE

## 2024-01-24 RX ORDER — INSULIN DEGLUDEC 100 U/ML
60 INJECTION, SOLUTION SUBCUTANEOUS DAILY
Qty: 90 ML | Refills: 1 | Status: SHIPPED | OUTPATIENT
Start: 2024-01-24

## 2024-01-24 RX ORDER — INSULIN ASPART INJECTION 100 [IU]/ML
8-15 INJECTION, SOLUTION SUBCUTANEOUS
Qty: 45 ML | Refills: 1 | Status: SHIPPED | OUTPATIENT
Start: 2024-01-24

## 2024-01-24 RX ORDER — LEVOTHYROXINE SODIUM 112 UG/1
112 TABLET ORAL
Qty: 90 TABLET | Refills: 2 | Status: SHIPPED | OUTPATIENT
Start: 2024-01-24

## 2024-01-24 RX ORDER — INSULIN DEGLUDEC 100 U/ML
60 INJECTION, SOLUTION SUBCUTANEOUS DAILY
Qty: 30 ML | Refills: 5 | Status: SHIPPED | OUTPATIENT
Start: 2024-01-24 | End: 2024-01-24 | Stop reason: SDUPTHER

## 2024-01-24 RX ORDER — INSULIN ASPART INJECTION 100 [IU]/ML
8-15 INJECTION, SOLUTION SUBCUTANEOUS
Qty: 20 ML | Refills: 0 | Status: SHIPPED | OUTPATIENT
Start: 2024-01-24

## 2024-01-24 RX ORDER — INSULIN ASPART INJECTION 100 [IU]/ML
8-15 INJECTION, SOLUTION SUBCUTANEOUS
Qty: 15 ML | Refills: 5 | Status: SHIPPED | OUTPATIENT
Start: 2024-01-24 | End: 2024-01-24 | Stop reason: SDUPTHER

## 2024-01-24 RX ORDER — FLUOXETINE HYDROCHLORIDE 20 MG/1
20 CAPSULE ORAL DAILY
Qty: 90 CAPSULE | Refills: 1 | Status: SHIPPED | OUTPATIENT
Start: 2024-01-24

## 2024-01-24 NOTE — PROGRESS NOTES
CHIEF COMPLAINT: Patient is here for follow up of Type 1 Diabetes Mellitus    HPI:     Michelle Espinoza is a 73 y.o. female with Type 1 Diabetes Mellitus here for follow up.      Labs from 1/24/2024 hba1c is 7.2%  Labs from 4/14/2023 Hba1c was 6.9%  Labs from 10/14/2022 HbA1c is 6.5%      She was diagnosed with type 1 diabetes over 40 years ago.  She was previously on an insulin pump but stopped utilizing it because of cost related issues.  She is currently on multiple daily insulin injections.  Major comorbid issues include COPD predominantly emphysematous,  Hyperlipidemia, hypertension, and primary hypothyroidism.  She is oxygen dependent.  She has been wearing a CGM for over 6 months          Currently she is on:  Tresiba 48 units daily  fiasp 1 unit for every 5 g (average 8 to 12 units 3 times a day with each meal)  correction scale 1 unit for every 50 greater than 150      She denies side effects with her medications  Download of her CGM shows an avwrage BG of 165 (150)  with TIR in  63% ( 77%)  She has highs after lunch      Incidentally she had a CT scan of her chest on October 3, 2022 which showed an incidental thyroid nodule in the posterior portion of the right lobe.   US in the office on Jan 2024 showed a suspicious nodule  Nodule #1  There is a(n) hypoechoic taller than wide solid nodule with smooth margins and no echogenic foci measuring 1.29 x 1.23 x 0.90 cm located on the right lower lobe.   She is schedule for FNA         For her hyperlipidemia she is on lovastatin 20 mg daily   She denies any side effects.  LDL cholesterol was 112 on 4/2023      She does not have diabetic kidney disease.  She has hypertension and is on captopril 25 mg twice a day  U ACR was less than 30 on 4/2023  Her last EGFR was greater than 60 on 4.2023      We are getting an eye exam today     She denies peripheral neuropathy symptoms        With respect to her primary hypothyroidism   She is on levothyroxine 112 MCG daily  She  reports good compliance  TSH is on 4/2023 on 2.5  Her TSH was optimal 1.2 on October 12, 2022          BG Diary:  Patient forgot her glucose meter    Weight has been stable    Diabetes Complications   Retinopathy: No known retinopathy.  Last eye exam: Patient will have an eye exam on November 1, 2022 at [facility]  Neuropathy: Denies paresthesias or numbness in hands or feet. Denies any foot wounds.  Exercise: Minimal.  Diet: Fair.  Patient's medications, allergies, and social histories were reviewed and updated as appropriate.    ROS:     CONS:     No fever, no chills   EYES:     No diplopia, no blurry vision   CV:           No chest pain, no palpitations   PULM:     No SOB, no cough, no hemoptysis.   GI:            No nausea, no vomiting, no diarrhea, no constipation   ENDO:     No polyuria, no polydipsia, no heat intolerance, no cold intolerance       Past Medical History:  Problem List:  2023-08: Chronic respiratory failure with hypoxia ([facility])  2021-09: MICHAELA (obstructive sleep apnea)  2021-09: Bacteremia  2021-08: Sepsis ([facility])  2021-08: Thrombocytopenia ([facility])  2016-12: Influenza A  2016-12: COPD (chronic obstructive pulmonary disease) ([facility])  2016-12: Osteoporosis  2016-12: Pneumonia  2016-12: Acute on chronic respiratory failure with hypoxia ([facility])  2016-10: Hypotension  2016-10: Dehydration  2016-10: Tobacco abuse  2014-11: Insulin pump status  2014-08: Type 1 diabetes mellitus with neurological manifestations,   uncontrolled ([facility])  2014-04: Disorder of bone and cartilage  2014-04: Fitting and adjustment of insulin pump  2014-04: Encounter for long-term (current) use of insulin ([facility])  2014-02: Low back pain  2013-09: Acquired hypothyroidism  2013-09: Obesity  2013-09: Postoperative anemia due to acute blood loss  2013-09: Hip pain  2013-08: Hypertension  2013-08: Dyslipidemia  2013-08: Vitamin D deficiency  2013-08: Uncontrolled type 1 diabetes mellitus      Past Surgical History:  Past Surgical History:  "  Procedure Laterality Date    HIP ARTHROPLASTY TOTAL  9/9/2013    Performed by Pedro Warren M.D. at SURGERY [facility]    BREAST RECONSTRUCTION  2001    MASTECTOMY  1992    right, full    MASTECTOMY  1987, 2001    partial, left x2        Allergies:  Bactrim [sulfamethoxazole w-trimethoprim] and Percocet [oxycodone-acetaminophen]     Social History:  Social History     Tobacco Use    Smoking status: Every Day     Current packs/day: 1.00     Average packs/day: 1 pack/day for 40.0 years (40.0 ttl pk-yrs)     Types: Cigarettes    Smokeless tobacco: Never   Vaping Use    Vaping Use: Never used   Substance Use Topics    Alcohol use: Yes     Comment: 2 per day    Drug use: No        Family History:   family history includes Lung Disease in an other family member.      PHYSICAL EXAM:   OBJECTIVE:  Vital signs: /80 (BP Location: Left arm, Patient Position: Sitting, BP Cuff Size: Adult)   Pulse 91   Ht 1.727 m (5' 8\")   Wt 108 kg (238 lb 8 oz)   SpO2 96%   BMI 36.26 kg/m²   GENERAL: Well-developed, well-nourished in no apparent distress.   EYE:  No ocular asymmetry, PERRLA  HENT: Pink, moist mucous membranes.    NECK: Thyroid exam was difficult as her neck is short and wide  CARDIOVASCULAR:  No murmurs  LUNGS: Clear breath sounds  ABDOMEN: Soft, nontender   EXTREMITIES: No clubbing, cyanosis, or edema.   NEUROLOGICAL: No gross focal motor abnormalities   LYMPH: No cervical adenopathy palpated.   SKIN: No rashes, lesions.     Labs:  Lab Results   Component Value Date/Time    HBA1C 6.9 (A) 04/14/2023 01:13 PM        Lab Results   Component Value Date/Time    WBC 11.9 (H) 09/13/2021 10:10 PM    RBC 3.96 (L) 09/13/2021 10:10 PM    HEMOGLOBIN 12.9 09/13/2021 10:10 PM    MCV 96.0 09/13/2021 10:10 PM    MCH 32.6 09/13/2021 10:10 PM    MCHC 33.9 09/13/2021 10:10 PM    RDW 45.1 09/13/2021 10:10 PM    MPV 10.4 09/13/2021 10:10 PM       Lab Results   Component Value Date/Time    SODIUM 137 04/11/2023 09:09 AM    " POTASSIUM 4.4 04/11/2023 09:09 AM    CHLORIDE 102 04/11/2023 09:09 AM    CO2 25 04/11/2023 09:09 AM    ANION 10.0 04/11/2023 09:09 AM    GLUCOSE 129 (H) 04/11/2023 09:09 AM    BUN 12 04/11/2023 09:09 AM    CREATININE 0.85 04/11/2023 09:09 AM    CALCIUM 9.1 04/11/2023 09:09 AM    ASTSGOT 22 04/11/2023 09:09 AM    ALTSGPT 24 04/11/2023 09:09 AM    TBILIRUBIN 0.4 04/11/2023 09:09 AM    ALBUMIN 4.0 04/11/2023 09:09 AM    TOTPROTEIN 6.8 04/11/2023 09:09 AM    GLOBULIN 2.8 04/11/2023 09:09 AM    AGRATIO 1.4 04/11/2023 09:09 AM       Lab Results   Component Value Date/Time    CHOLSTRLTOT 187 10/12/2022 0925    TRIGLYCERIDE 129 10/12/2022 0925    HDL 73 10/12/2022 0925    LDL 88 10/12/2022 0925       Lab Results   Component Value Date/Time    MALBCRT see below 04/11/2023 09:08 AM    MICROALBUR <1.2 04/11/2023 09:08 AM        Lab Results   Component Value Date/Time    TSHULTRASEN 1.220 10/12/2022 0925     No results found for: FREEDIR  Lab Results   Component Value Date/Time    FREET3 2.91 10/12/2022 0925     No results found for: THYSTIMIG        ASSESSMENT/PLAN:     1. Uncontrolled type 1 diabetes mellitus with hyperglycemia (HCC)  Stable control  A1c  is 7.2%  Her CGM was downloaded and reviewed  Continue Tresiba 48 units daily  Continue Fiasp but change carb ratio to 1 unit for every 4 g  Continue same correction scale  She is up-to-date with her labs  Follow-up with me in 3 months    2. Acquired hypothyroidism  Controlled  Continue levothyroxine 112 MCG daily  Repeat TSH in 3 months    3. Dyslipidemia  Controlled  Continue lovastatin  Repeat fasting lipids in 3 months    4. Vitamin D deficiency  Stable  Continue monitoring vitamin D levels    5. Encounter for long-term (current) use of insulin (HCC)  Patient is on long-term insulin therapy for type 1 diabetes    6. Right thyroid nodule  Stable  US was completed  Will schedule for FNA of RML solid nodule      Return in about 3 months (around 4/24/2024).    Total time  spent on day of service was over 60 minutes which included obtaining a detailed history and physical exam, ordering labs, coordinating care and scheduling future follow-up    Thank you kindly for allowing me to participate in the diabetes care plan for this patient.    Keny Robison MD, FACE, [facility]      CC:   Blaire Perez M.D.

## 2024-03-27 NOTE — TELEPHONE ENCOUNTER
----- Message from Norman Lantigua PharmD sent at 11/28/2022  2:51 PM PST -----  Regarding: Pt called asking for Breztri samples - can someone touch base w/ her?       You can access the FollowMyHealth Patient Portal offered by [facility] by registering at the following website: [email or web address]. By joining [employer]’s FollowMyHealth portal, you will also be able to view your health information using other applications (apps) compatible with our system.

## 2024-04-22 ENCOUNTER — PROCEDURE VISIT (OUTPATIENT)
Dept: ENDOCRINOLOGY | Facility: MEDICAL CENTER | Age: 74
End: 2024-04-22
Attending: INTERNAL MEDICINE
Payer: MEDICARE

## 2024-04-22 ENCOUNTER — HOSPITAL ENCOUNTER (OUTPATIENT)
Facility: MEDICAL CENTER | Age: 74
End: 2024-04-22
Attending: INTERNAL MEDICINE
Payer: MEDICARE

## 2024-04-22 ENCOUNTER — TELEMEDICINE (OUTPATIENT)
Dept: SLEEP MEDICINE | Facility: MEDICAL CENTER | Age: 74
End: 2024-04-22
Attending: INTERNAL MEDICINE
Payer: MEDICARE

## 2024-04-22 VITALS — HEART RATE: 76 BPM | BODY MASS INDEX: 35.31 KG/M2 | WEIGHT: 233 LBS | OXYGEN SATURATION: 97 % | HEIGHT: 68 IN

## 2024-04-22 DIAGNOSIS — G47.33 OSA (OBSTRUCTIVE SLEEP APNEA): ICD-10-CM

## 2024-04-22 DIAGNOSIS — Z72.0 TOBACCO USE: ICD-10-CM

## 2024-04-22 DIAGNOSIS — E04.1 THYROID NODULE: ICD-10-CM

## 2024-04-22 DIAGNOSIS — J44.9 CHRONIC OBSTRUCTIVE PULMONARY DISEASE, UNSPECIFIED COPD TYPE (HCC): ICD-10-CM

## 2024-04-22 DIAGNOSIS — J96.11 CHRONIC RESPIRATORY FAILURE WITH HYPOXIA (HCC): ICD-10-CM

## 2024-04-22 LAB — PATHOLOGY CONSULT NOTE: NORMAL

## 2024-04-22 PROCEDURE — 10005 FNA BX W/US GDN 1ST LES: CPT | Mod: 26 | Performed by: INTERNAL MEDICINE

## 2024-04-22 PROCEDURE — 99213 OFFICE O/P EST LOW 20 MIN: CPT | Mod: 95 | Performed by: INTERNAL MEDICINE

## 2024-04-22 PROCEDURE — 88173 CYTOPATH EVAL FNA REPORT: CPT

## 2024-04-22 PROCEDURE — 10005 FNA BX W/US GDN 1ST LES: CPT | Performed by: INTERNAL MEDICINE

## 2024-04-22 ASSESSMENT — ENCOUNTER SYMPTOMS
COUGH: 0
WEAKNESS: 0
SHORTNESS OF BREATH: 0
ORTHOPNEA: 0
CLAUDICATION: 0
BACK PAIN: 0
DIARRHEA: 0
ABDOMINAL PAIN: 0
MYALGIAS: 0
STRIDOR: 0
PHOTOPHOBIA: 0
CHILLS: 0
SPEECH CHANGE: 0
SPUTUM PRODUCTION: 0
DIZZINESS: 0
PALPITATIONS: 0
BLURRED VISION: 0
FALLS: 0
SINUS PAIN: 0
EYE REDNESS: 0
PND: 0
DOUBLE VISION: 0
WHEEZING: 0
FOCAL WEAKNESS: 0
HEARTBURN: 0
TREMORS: 0
FEVER: 0
VOMITING: 0
NAUSEA: 0
DEPRESSION: 0
CONSTIPATION: 0
NECK PAIN: 0
EYE PAIN: 0
SORE THROAT: 0
EYE DISCHARGE: 0
HEMOPTYSIS: 0
DIAPHORESIS: 0
HEADACHES: 0
WEIGHT LOSS: 0

## 2024-04-22 ASSESSMENT — PATIENT HEALTH QUESTIONNAIRE - PHQ9: CLINICAL INTERPRETATION OF PHQ2 SCORE: 0

## 2024-04-22 NOTE — PROGRESS NOTES
Thyroid US done on this procedure visit for a RLL solid nodule measuring 1.3 cm   Please see dictated POC US report completed by endocrinologist  Patient advised to follow up as planned with labs prior    Keny Robison M.D.

## 2024-04-22 NOTE — PROGRESS NOTES
Chief Complaint   Patient presents with    Follow-Up     LAST SEEN 9/20/23     This evaluation was conducted via Zoom using secure and encrypted videoconferencing technology. The patient was in their home in the [facility].    The patient's identity was confirmed and verbal consent was obtained for this virtual visit.     HPI: This patient is a 73 y.o. female whom is followed in our clinic for COPD c/b chronic hypoxemic respiratory failure last seen by me on 9/20/23.  MHx is significant for type 1 diabetes, hypothyroid, dyslipidemia, MICHAELA on CPAP followed by sleep medicine. She is a current tobacco smoker with greater than 40-pack-year history currently smoking just under a pack per day. She has been on Spiriva and Qvar in the past and we transitioned her to Trelegy 100 which was replaced with breztri. She is benefiting from this. No exacerbations since our last virtual visit.  She also uses nebulized bronchodilators up to 4x daily. O2 needs have stabilized since last April but increased over the past 2 years to 5-6 LPM. She was hospitalized for pneumonia and E. coli bacteremia in late August and early September of 2021  CT chest at that time showed emphysematous changes with patchy groundglass infiltrates bilaterally involving both upper and lower lobes. Repeat CT 10/2022 showed clearance of infiltrate but also showed R thyroid nodule 15 mm in size and hypoattenuation of the liver and focal densisities more prominent than on prior CT. She has biopsy scheduled for thyroid nodule. PFTs from 10/2022 showed severe air flow obstruction with FEV1 post BD of 36% pred, +BD response, normal TLC at 106% pred, air trapping with RV of 164% pred and DLCO of 57% pred. She recently lost her  to complications of chronic illness earlier this month. Functionally she is MMRC 3-4.     Past Medical History:   Diagnosis Date    Anesthesia     PONV    Arthritis     ASTHMA     Cancer (HCC)     history of breast cancer     Chickenpox     Cough     Dental disorder     dentures, full upper and lower    Diabetes     insulin pump    Diabetic neuropathy (HCC)     EMPHYSEMA     Encounter for long-term (current) use of insulin (HCC) 04/23/2014    Epilepsy (HCC)     Fitting and adjustment of insulin pump 04/23/2014    Heart burn     Mumps     osteopenia 04/23/2014    Pain     hip    Psychiatric problem     depression    Shortness of breath     Sleep apnea     CPAP    Unspecified disorder of thyroid     hypothyroid    Wheezing        Social History     Socioeconomic History    Marital status:      Spouse name: Not on file    Number of children: Not on file    Years of education: Not on file    Highest education level: Not on file   Occupational History    Not on file   Tobacco Use    Smoking status: Every Day     Current packs/day: 1.00     Average packs/day: 1 pack/day for 40.0 years (40.0 ttl pk-yrs)     Types: Cigarettes    Smokeless tobacco: Never   Vaping Use    Vaping Use: Never used   Substance and Sexual Activity    Alcohol use: Yes     Comment: 2 per day    Drug use: No    Sexual activity: Not on file   Other Topics Concern    Not on file   Social History Narrative    Not on file     Social Determinants of Health     Financial Resource Strain: Not on file   Food Insecurity: Not on file   Transportation Needs: Not on file   Physical Activity: Not on file   Stress: Not on file   Social Connections: Not on file   Intimate Partner Violence: Not on file   Housing Stability: Not on file       Family History   Problem Relation Age of Onset    Lung Disease Other        Current Outpatient Medications on File Prior to Visit   Medication Sig Dispense Refill    ipratropium-albuterol (DUONEB) 0.5-2.5 (3) MG/3ML nebulizer solution Take 3 mL by nebulization every 6 hours as needed for Shortness of Breath. 120 mL 6    albuterol 108 (90 Base) MCG/ACT Aero Soln inhalation aerosol Inhale 1-2 Puffs every four hours as needed for Shortness of  Breath. 8.5 g 6    montelukast (SINGULAIR) 10 MG Tab Take 10 mg by mouth every day.      Insulin Aspart, w/Niacinamide, (FIASP) 100 UNIT/ML Solution Inject 8-15 Units under the skin 3 times a day before meals. Sample only 20 mL 0    levothyroxine (SYNTHROID) 112 MCG Tab Take 1 Tablet by mouth every morning on an empty stomach. 90 Tablet 2    FLUoxetine (PROZAC) 20 MG Cap Take 1 Capsule by mouth every day. 90 Capsule 1    Insulin Degludec (TRESIBA FLEXTOUCH) 100 UNIT/ML Solution Pen-injector Inject 60 Units under the skin every day. 90 day 90 mL 1    Insulin Aspart, w/Niacinamide, (FIASP FLEXTOUCH) 100 UNIT/ML Solution Pen-injector Inject 8-15 Units under the skin 3 times a day before meals. 90 day 45 mL 1    Continuous Blood Gluc Sensor (FREESTYLE MIKEY 2 SENSOR) [facility] APPLY 1 SENSOR AND USE TO CHECK BLOOD GLUCOSE CONTINUOUSLY, CHANGE EVERY 14 DAYS 6 Each 3    azithromycin (ZITHROMAX) 250 MG Tab Take 2 tablets on day 1, then take 1 tablet a day for 4 days. 6 Tablet 0    Continuous Blood Gluc  (FREESTYLE MIKEY 2 READER) Device 1 Each continuous. 1 Each 1    captopril (CAPOTEN) 25 MG Tab Take 25 mg by mouth 2 times a day.      celecoxib (CELEBREX) 200 MG Cap Take 200 mg by mouth every day.      Multiple Vitamins-Minerals (CENTRUM SILVER 50+WOMEN) Tab Take 1 Tablet by mouth every day.      esomeprazole (NEXIUM) 20 MG capsule Take 20 mg by mouth 2 times a day.      Cyanocobalamin (VITAMIN B 12 PO) Take 1 Tablet by mouth every day.      Cholecalciferol (VITAMIN D) 2000 UNIT Tab Take 4,000 Units by mouth every day.      cetirizine (ZYRTEC) 10 MG Tab Take 10 mg by mouth 2 times a day.      B Complex Cap Take 1 Cap by mouth every day.      raloxifene (EVISTA) 60 MG Tab Take 60 mg by mouth every day.      aspirin EC (ECOTRIN) 81 MG TBEC Take 81 mg by mouth every day.      lovastatin (MEVACOR) 20 MG TABS Take 20 mg by mouth every evening.       No current facility-administered medications on file prior to visit.  "      Bactrim [sulfamethoxazole w-trimethoprim] and Percocet [oxycodone-acetaminophen]      ROS:   Review of Systems   Constitutional:  Negative for chills, diaphoresis, fever, malaise/fatigue and weight loss.   HENT:  Negative for congestion, ear discharge, ear pain, hearing loss, nosebleeds, sinus pain, sore throat and tinnitus.    Eyes:  Negative for blurred vision, double vision, photophobia, pain, discharge and redness.   Respiratory:  Negative for cough, hemoptysis, sputum production, shortness of breath, wheezing and stridor.    Cardiovascular:  Negative for chest pain, palpitations, orthopnea, claudication, leg swelling and PND.   Gastrointestinal:  Negative for abdominal pain, constipation, diarrhea, heartburn, nausea and vomiting.   Genitourinary:  Negative for dysuria and urgency.   Musculoskeletal:  Negative for back pain, falls, joint pain, myalgias and neck pain.   Skin:  Negative for itching and rash.   Neurological:  Negative for dizziness, tremors, speech change, focal weakness, weakness and headaches.   Endo/Heme/Allergies:  Negative for environmental allergies.   Psychiatric/Behavioral:  Negative for depression.        Pulse 76   Ht 1.727 m (5' 8\")   Wt 106 kg (233 lb)   SpO2 97%   Physical Exam  Vitals reviewed.   Constitutional:       General: She is not in acute distress.     Appearance: Normal appearance. She is obese.   HENT:      Head: Normocephalic and atraumatic.      Right Ear: External ear normal.      Left Ear: External ear normal.      Nose: Nose normal. No congestion.      Mouth/Throat:      Mouth: Mucous membranes are moist.      Pharynx: Oropharynx is clear. No oropharyngeal exudate.   Eyes:      General: No scleral icterus.     Extraocular Movements: Extraocular movements intact.      Conjunctiva/sclera: Conjunctivae normal.      Pupils: Pupils are equal, round, and reactive to light.   Neurological:      Mental Status: She is alert and oriented to person, place, and time.      " Cranial Nerves: No cranial nerve deficit.   Psychiatric:         Mood and Affect: Mood normal.         Behavior: Behavior normal.         PFTs as reviewed by me personally: as per hPI    Imaging as reviewed by me personally:  as per HPI    Assessment:  1. Chronic obstructive pulmonary disease, unspecified COPD type (HCC)  Budeson-Glycopyrrol-Formoterol (BREZTRI AEROSPHERE) 160-9-4.8 MCG/ACT Aerosol      2. Chronic respiratory failure with hypoxia (HCC)        3. MICHAELA (obstructive sleep apnea)        4. Tobacco use        5. Thyroid nodule            Plan:  Chronic. Severe. Stable since our last virtual visit.  Counseled tobacco cessation.  Continue Breztri and supplemental oxygen.  Chronic and secondary to COPD.  Oxygen needs are stable but patient continues to use tobacco.  Unfortunately we are no longer doing virtual pulmonary rehab.  Patient is compliant with and benefiting from supplemental oxygen between 5 and 6 L/min.  On CPAP therapy but has not had evaluation in sleep medicine since 2022.  Will try to review compliance data next visit.  Counseled on complete tobacco cessation. Discuss lung Ca screening next visit.   Biopsy scheduled.  Return in about 6 months (around 10/22/2024) for COPD.

## 2024-04-23 ENCOUNTER — TELEPHONE (OUTPATIENT)
Dept: SLEEP MEDICINE | Facility: MEDICAL CENTER | Age: 74
End: 2024-04-23
Payer: MEDICARE

## 2024-04-23 NOTE — TELEPHONE ENCOUNTER
Attempted to contact patient at [phone] to discuss [facility] and services/benefits offered. No answer, left voicemail.    Nicolle Baker

## 2024-05-03 DIAGNOSIS — F32.0 CURRENT MILD EPISODE OF MAJOR DEPRESSIVE DISORDER WITHOUT PRIOR EPISODE (HCC): ICD-10-CM

## 2024-05-05 RX ORDER — FLUOXETINE HYDROCHLORIDE 20 MG/1
20 CAPSULE ORAL DAILY
Qty: 90 CAPSULE | Refills: 1 | Status: SHIPPED | OUTPATIENT
Start: 2024-05-05

## 2024-05-16 ENCOUNTER — HOSPITAL ENCOUNTER (OUTPATIENT)
Dept: LAB | Facility: MEDICAL CENTER | Age: 74
End: 2024-05-16
Attending: INTERNAL MEDICINE
Payer: MEDICARE

## 2024-05-16 DIAGNOSIS — E03.9 ACQUIRED HYPOTHYROIDISM: ICD-10-CM

## 2024-05-16 DIAGNOSIS — E78.5 DYSLIPIDEMIA: ICD-10-CM

## 2024-05-16 DIAGNOSIS — E10.65 UNCONTROLLED TYPE 1 DIABETES MELLITUS WITH HYPERGLYCEMIA (HCC): ICD-10-CM

## 2024-05-16 DIAGNOSIS — E04.1 THYROID NODULE: ICD-10-CM

## 2024-05-16 DIAGNOSIS — Z79.4 ENCOUNTER FOR LONG-TERM (CURRENT) USE OF INSULIN (HCC): ICD-10-CM

## 2024-05-16 DIAGNOSIS — E55.9 VITAMIN D DEFICIENCY: ICD-10-CM

## 2024-05-16 LAB
25(OH)D3 SERPL-MCNC: 56 NG/ML (ref 30–100)
ALBUMIN SERPL BCP-MCNC: 3.8 G/DL (ref 3.2–4.9)
ALBUMIN/GLOB SERPL: 1.5 G/DL
ALP SERPL-CCNC: 100 U/L (ref 30–99)
ALT SERPL-CCNC: 14 U/L (ref 2–50)
ANION GAP SERPL CALC-SCNC: 11 MMOL/L (ref 7–16)
AST SERPL-CCNC: 17 U/L (ref 12–45)
BILIRUB SERPL-MCNC: 0.3 MG/DL (ref 0.1–1.5)
BUN SERPL-MCNC: 16 MG/DL (ref 8–22)
CALCIUM ALBUM COR SERPL-MCNC: 9.1 MG/DL (ref 8.5–10.5)
CALCIUM SERPL-MCNC: 8.9 MG/DL (ref 8.5–10.5)
CHLORIDE SERPL-SCNC: 99 MMOL/L (ref 96–112)
CHOLEST SERPL-MCNC: 165 MG/DL (ref 100–199)
CO2 SERPL-SCNC: 25 MMOL/L (ref 20–33)
CREAT SERPL-MCNC: 0.8 MG/DL (ref 0.5–1.4)
GFR SERPLBLD CREATININE-BSD FMLA CKD-EPI: 77 ML/MIN/1.73 M 2
GLOBULIN SER CALC-MCNC: 2.5 G/DL (ref 1.9–3.5)
GLUCOSE SERPL-MCNC: 135 MG/DL (ref 65–99)
HDLC SERPL-MCNC: 64 MG/DL
LDLC SERPL CALC-MCNC: 82 MG/DL
POTASSIUM SERPL-SCNC: 4.4 MMOL/L (ref 3.6–5.5)
PROT SERPL-MCNC: 6.3 G/DL (ref 6–8.2)
SODIUM SERPL-SCNC: 135 MMOL/L (ref 135–145)
T4 FREE SERPL-MCNC: 1.23 NG/DL (ref 0.93–1.7)
TRIGL SERPL-MCNC: 97 MG/DL (ref 0–149)
TSH SERPL DL<=0.005 MIU/L-ACNC: 1.57 UIU/ML (ref 0.38–5.33)

## 2024-05-17 LAB
CREAT UR-MCNC: 40.51 MG/DL
MICROALBUMIN UR-MCNC: <1.2 MG/DL
MICROALBUMIN/CREAT UR: NORMAL MG/G (ref 0–30)

## 2024-05-23 ENCOUNTER — OFFICE VISIT (OUTPATIENT)
Dept: ENDOCRINOLOGY | Facility: MEDICAL CENTER | Age: 74
End: 2024-05-23
Attending: INTERNAL MEDICINE
Payer: MEDICARE

## 2024-05-23 ENCOUNTER — PHARMACY VISIT (OUTPATIENT)
Dept: PHARMACY | Facility: MEDICAL CENTER | Age: 74
End: 2024-05-23
Payer: COMMERCIAL

## 2024-05-23 VITALS
RESPIRATION RATE: 17 BRPM | DIASTOLIC BLOOD PRESSURE: 68 MMHG | OXYGEN SATURATION: 94 % | HEART RATE: 73 BPM | HEIGHT: 68 IN | SYSTOLIC BLOOD PRESSURE: 126 MMHG | WEIGHT: 236 LBS | BODY MASS INDEX: 35.77 KG/M2

## 2024-05-23 DIAGNOSIS — E10.65 UNCONTROLLED TYPE 1 DIABETES MELLITUS WITH HYPERGLYCEMIA (HCC): ICD-10-CM

## 2024-05-23 DIAGNOSIS — E78.5 DYSLIPIDEMIA: ICD-10-CM

## 2024-05-23 DIAGNOSIS — Z79.4 ENCOUNTER FOR LONG-TERM (CURRENT) USE OF INSULIN (HCC): ICD-10-CM

## 2024-05-23 DIAGNOSIS — E04.1 THYROID NODULE: ICD-10-CM

## 2024-05-23 DIAGNOSIS — E03.9 ACQUIRED HYPOTHYROIDISM: ICD-10-CM

## 2024-05-23 DIAGNOSIS — E55.9 VITAMIN D DEFICIENCY: ICD-10-CM

## 2024-05-23 LAB
HBA1C MFR BLD: 6.8 % (ref ?–5.8)
POCT INT CON NEG: NEGATIVE
POCT INT CON POS: POSITIVE
RETINAL SCREEN: NORMAL

## 2024-05-23 PROCEDURE — 3078F DIAST BP <80 MM HG: CPT | Performed by: INTERNAL MEDICINE

## 2024-05-23 PROCEDURE — RXMED WILLOW AMBULATORY MEDICATION CHARGE: Performed by: INTERNAL MEDICINE

## 2024-05-23 PROCEDURE — 95251 CONT GLUC MNTR ANALYSIS I&R: CPT | Performed by: INTERNAL MEDICINE

## 2024-05-23 PROCEDURE — 3074F SYST BP LT 130 MM HG: CPT | Performed by: INTERNAL MEDICINE

## 2024-05-23 PROCEDURE — 99214 OFFICE O/P EST MOD 30 MIN: CPT | Performed by: INTERNAL MEDICINE

## 2024-05-23 RX ORDER — INSULIN ASPART INJECTION 100 [IU]/ML
10 INJECTION, SOLUTION SUBCUTANEOUS
Qty: 20 ML | Refills: 11 | Status: SHIPPED | OUTPATIENT
Start: 2024-05-23 | End: 2024-05-28

## 2024-05-23 RX ORDER — INSULIN LISPRO-AABC 100 [IU]/ML
10 INJECTION, SOLUTION INTRAVENOUS; SUBCUTANEOUS 3 TIMES DAILY
Qty: 30 ML | Refills: 0 | Status: SHIPPED | OUTPATIENT
Start: 2024-05-23

## 2024-05-23 NOTE — PROGRESS NOTES
CHIEF COMPLAINT: Patient is here for follow up of Type 1 Diabetes Mellitus    HPI:     Michelle Espinoza is a 73 y.o. female with Type 1 Diabetes Mellitus here for follow up.    Labs from May 23, 2024 show A1c is 6.8%  Labs from 1/24/2024 hba1c was 7.2%  Labs from 4/14/2023 Hba1c was 6.9%  Labs from 10/14/2022 HbA1c was 6.5%      She was diagnosed with type 1 diabetes over 40 years ago.  She was previously on an insulin pump but stopped utilizing it because of cost related issues.  She is currently on multiple daily insulin injections.  Major comorbid issues include COPD predominantly emphysematous,  Hyperlipidemia, hypertension, and primary hypothyroidism.  She is oxygen dependent.  She has been wearing a CGM for over 6 months          Currently she is on:  Tresiba 52 units daily  fiasp 1 unit for every 5 g (average 15u bfast and 8-10 for lunch and  0-8 for dinner  correction scale 1 unit for every 50 greater than 150      She denies side effects with her medications  Download of her CGM shows an avwrage BG of  173 (165 150)  with TIR in   61%( 63%77%)  She has highs after lunch            Incidentally she had a CT scan of her chest on October 3, 2022 which showed an incidental thyroid nodule in the posterior portion of the right lobe.   US in the office on Jan 2024 showed a suspicious nodule  Nodule #1  There is a(n) hypoechoic taller than wide solid nodule with smooth margins and no echogenic foci measuring 1.29 x 1.23 x 0.90 cm located on the right lower lobe.  FNAB on this RLL solid nodule was benign          For her hyperlipidemia she is on lovastatin 20 mg daily   She denies any side effects.   Latest Reference Range & Units 05/16/24 09:51   Cholesterol,Tot 100 - 199 mg/dL 165   Triglycerides 0 - 149 mg/dL 97   HDL >=40 mg/dL 64   LDL <100 mg/dL 82         She does not have diabetic kidney disease.  She has hypertension and is on captopril 25 mg twice a day   Latest Reference Range & Units 05/16/24 09:51    Micro Alb Creat Ratio 0 - 30 mg/g see below   Creatinine, Urine mg/dL 40.51   Microalbumin, Urine Random mg/dL <1.2       POCT eye exam was done today     She denies peripheral neuropathy symptoms        With respect to her primary hypothyroidism   She is on levothyroxine 112 MCG daily  She reports good compliance  TSH is 1.57 on 5/2024  TSH was 2.5  on 4/2023   TSH  was1.2 on October 12, 2022          BG Diary:  Patient forgot her glucose meter    Weight has been stable    Diabetes Complications   Retinopathy: No known retinopathy.  Last eye exam: Patient will have an eye exam on November 1, 2022 at [facility]  Neuropathy: Denies paresthesias or numbness in hands or feet. Denies any foot wounds.  Exercise: Minimal.  Diet: Fair.  Patient's medications, allergies, and social histories were reviewed and updated as appropriate.    ROS:     CONS:     No fever, no chills   EYES:     No diplopia, no blurry vision   CV:           No chest pain, no palpitations   PULM:     No SOB, no cough, no hemoptysis.   GI:            No nausea, no vomiting, no diarrhea, no constipation   ENDO:     No polyuria, no polydipsia, no heat intolerance, no cold intolerance       Past Medical History:  Problem List:  2023-08: Chronic respiratory failure with hypoxia ([facility])  2021-09: MICHAELA (obstructive sleep apnea)  2021-09: Bacteremia  2021-08: Sepsis ([facility])  2021-08: Thrombocytopenia ([facility])  2016-12: Influenza A  2016-12: COPD (chronic obstructive pulmonary disease) ([facility])  2016-12: Osteoporosis  2016-12: Pneumonia  2016-12: Acute on chronic respiratory failure with hypoxia ([facility])  2016-10: Hypotension  2016-10: Dehydration  2016-10: Tobacco abuse  2014-11: Insulin pump status  2014-08: Type 1 diabetes mellitus with neurological manifestations,   uncontrolled ([facility])  2014-04: Disorder of bone and cartilage  2014-04: Fitting and adjustment of insulin pump  2014-04: Encounter for long-term (current) use of insulin ([facility])  2014-02: Low back  "pain  2013-09: Acquired hypothyroidism  2013-09: Obesity  2013-09: Postoperative anemia due to acute blood loss  2013-09: Hip pain  2013-08: Hypertension  2013-08: Dyslipidemia  2013-08: Vitamin D deficiency  2013-08: Uncontrolled type 1 diabetes mellitus      Past Surgical History:  Past Surgical History:   Procedure Laterality Date    HIP ARTHROPLASTY TOTAL  9/9/2013    Performed by Pedro Warren M.D. at SURGERY [facility]    BREAST RECONSTRUCTION  2001    MASTECTOMY  1992    right, full    MASTECTOMY  1987, 2001    partial, left x2        Allergies:  Bactrim [sulfamethoxazole w-trimethoprim] and Percocet [oxycodone-acetaminophen]     Social History:  Social History     Tobacco Use    Smoking status: Every Day     Current packs/day: 1.00     Average packs/day: 1 pack/day for 40.0 years (40.0 ttl pk-yrs)     Types: Cigarettes    Smokeless tobacco: Never   Vaping Use    Vaping status: Never Used   Substance Use Topics    Alcohol use: Yes     Comment: 2 per day    Drug use: No        Family History:   family history includes Lung Disease in an other family member.      PHYSICAL EXAM:   OBJECTIVE:  Vital signs: /68 (BP Location: Right arm, Patient Position: Sitting, BP Cuff Size: Large adult)   Pulse 73   Resp 17   Ht 1.727 m (5' 8\")   Wt 107 kg (236 lb)   SpO2 94% Comment: 4 liters o2  BMI 35.88 kg/m²   GENERAL: Well-developed, well-nourished in no apparent distress.   EYE:  No ocular asymmetry, PERRLA  HENT: Pink, moist mucous membranes.    NECK: Thyroid exam was difficult as her neck is short and wide  CARDIOVASCULAR:  No murmurs  LUNGS: Clear breath sounds  ABDOMEN: Soft, nontender   EXTREMITIES: No clubbing, cyanosis, or edema.   NEUROLOGICAL: No gross focal motor abnormalities   LYMPH: No cervical adenopathy palpated.   SKIN: No rashes, lesions.     Labs:  Lab Results   Component Value Date/Time    HBA1C 6.8 (A) 05/23/2024 11:19 AM        Lab Results   Component Value Date/Time    WBC 11.9 " (H) 09/13/2021 10:10 PM    RBC 3.96 (L) 09/13/2021 10:10 PM    HEMOGLOBIN 12.9 09/13/2021 10:10 PM    MCV 96.0 09/13/2021 10:10 PM    MCH 32.6 09/13/2021 10:10 PM    MCHC 33.9 09/13/2021 10:10 PM    RDW 45.1 09/13/2021 10:10 PM    MPV 10.4 09/13/2021 10:10 PM       Lab Results   Component Value Date/Time    SODIUM 135 05/16/2024 09:51 AM    POTASSIUM 4.4 05/16/2024 09:51 AM    CHLORIDE 99 05/16/2024 09:51 AM    CO2 25 05/16/2024 09:51 AM    ANION 11.0 05/16/2024 09:51 AM    GLUCOSE 135 (H) 05/16/2024 09:51 AM    BUN 16 05/16/2024 09:51 AM    CREATININE 0.80 05/16/2024 09:51 AM    CALCIUM 8.9 05/16/2024 09:51 AM    ASTSGOT 17 05/16/2024 09:51 AM    ALTSGPT 14 05/16/2024 09:51 AM    TBILIRUBIN 0.3 05/16/2024 09:51 AM    ALBUMIN 3.8 05/16/2024 09:51 AM    TOTPROTEIN 6.3 05/16/2024 09:51 AM    GLOBULIN 2.5 05/16/2024 09:51 AM    AGRATIO 1.5 05/16/2024 09:51 AM       Lab Results   Component Value Date/Time    CHOLSTRLTOT 187 10/12/2022 0925    TRIGLYCERIDE 129 10/12/2022 0925    HDL 73 10/12/2022 0925    LDL 88 10/12/2022 0925       Lab Results   Component Value Date/Time    MALBCRT see below 05/16/2024 09:51 AM    MICROALBUR <1.2 05/16/2024 09:51 AM        Lab Results   Component Value Date/Time    TSHULTRASEN 1.220 10/12/2022 0925     No results found for: FREEDIR  Lab Results   Component Value Date/Time    FREET3 2.91 10/12/2022 0925     No results found for: THYSTIMIG        ASSESSMENT/PLAN:     1. Uncontrolled type 1 diabetes mellitus with hyperglycemia (HCC)  Stable control  A1c  is 6.8%  Her CGM was downloaded and reviewed  Continue Tresiba 52 units daily  Continue Fiasp 15 for b fast increase Fiasp 10-12 for lunch and continue 0-8u for dinner  Continue same correction scale  She is up-to-date with her labs  Follow-up with me in 6 months    2. Acquired hypothyroidism  Controlled  Continue levothyroxine 112 MCG daily  Repeat TSH in 6 months    3. Dyslipidemia  Controlled  Continue lovastatin  Repeat fasting lipids  in 12 months    4. Vitamin D deficiency  Stable  Continue monitoring vitamin D levels    5. Encounter for long-term (current) use of insulin (HCC)  Patient is on long-term insulin therapy for type 1 diabetes    6. Right thyroid nodule  Stable  US was completed and FNAB was done it is benign  Recommend observation and repeat US in 12 mos      Return in about 6 months (around 11/23/2024).      Thank you kindly for allowing me to participate in the diabetes care plan for this patient.    Keny Robison MD, ASHLY, [facility]      CC:   Blaire Perez M.D.

## 2024-05-23 NOTE — PROGRESS NOTES
"RN-CDE Note    Subjective:   Endocrinology Clinic Progress Note  PCP: Blaire Perez M.D.    HPI:  Michelle Espinoza is a 73 y.o. old patient who is seen today by the Diabetes Nurse Specialist for review of her type 1 diabetes.    Recent changes in health:  denies any changes in health  DM:   Last A1c:   Lab Results   Component Value Date/Time    HBA1C 6.8 (A) 05/23/2024 11:19 AM      Previous A1c was 7.2 on 1/24/2024  A1C GOAL: < 7    Diabetes Medications:   Tresiba insulin 52 units per day  Fiasp insulin  takes 15 units with breakfast and 8-10 units with lunch.  Sometimes no insulin with dinner unless her blood sugars are elevated and then she will only correct.       Exercise: no regular exercise, sedentary  Diet: \"healthy\" diet  in general  Patient's body mass index is 35.88 kg/m². Exercise and nutrition counseling were performed at this visit.    Glucose monitoring frequency:  using Freestyle Aiden CGM               Hypoglycemic episodes: cgm data shows no blood sugars less than 70 in the past 2 weeks.   Last Retinal Exam: poc retinal exam completed in office  Foot Exam:  Monofilament: current.   Lab Results   Component Value Date/Time    MALBCRT see below 05/16/2024 09:51 AM    MICROALBUR <1.2 05/16/2024 09:51 AM        ACR Albumin/Creatinine Ratio goal <30     HTN:   Blood pressure goal <130/<80 .   Currently Rx ACE/ARB: Yes     Dyslipidemia:    Lab Results   Component Value Date/Time    CHOLSTRLTOT 165 05/16/2024 09:51 AM    LDL 82 05/16/2024 09:51 AM    HDL 64 05/16/2024 09:51 AM    TRIGLYCERIDE 97 05/16/2024 09:51 AM         Currently Rx Statin: Yes     She  reports that she has been smoking cigarettes. She has a 40 pack-year smoking history. She has never used smokeless tobacco.      Plan:     Discussed and educated on:   - All medications, side effects and compliance (discussed carefully)  - Annual eye examinations at Ophthalmology  - Foot Care:   - HbA1C: target  - Home glucose monitoring " emphasized  - Weight control and daily exercise    Recommended medication changes: no changes

## 2024-05-28 DIAGNOSIS — E10.65 UNCONTROLLED TYPE 1 DIABETES MELLITUS WITH HYPERGLYCEMIA (HCC): ICD-10-CM

## 2024-05-28 RX ORDER — INSULIN ASPART 100 [IU]/ML
10 INJECTION, SOLUTION INTRAVENOUS; SUBCUTANEOUS
Qty: 20 ML | Refills: 11 | Status: SHIPPED | OUTPATIENT
Start: 2024-05-28

## 2024-06-01 DIAGNOSIS — J44.1 COPD EXACERBATION (HCC): ICD-10-CM

## 2024-06-01 DIAGNOSIS — J45.41 MODERATE PERSISTENT ASTHMA WITH EXACERBATION: ICD-10-CM

## 2024-06-03 RX ORDER — IPRATROPIUM BROMIDE AND ALBUTEROL SULFATE 2.5; .5 MG/3ML; MG/3ML
3 SOLUTION RESPIRATORY (INHALATION) EVERY 6 HOURS PRN
Qty: 180 ML | Refills: 6 | Status: SHIPPED | OUTPATIENT
Start: 2024-06-03

## 2024-06-03 NOTE — TELEPHONE ENCOUNTER
Have we ever prescribed this med? Yes.  If yes, what date? 01/10/24    Last OV: 04/22/24 with Dr Majano     Next OV: No Pending appt.     DX: COPD exacerbation    Medications:   Requested Prescriptions     Pending Prescriptions Disp Refills    ipratropium-albuterol (DUONEB) 0.5-2.5 (3) MG/3ML nebulizer solution [Pharmacy Med Name: IPRAT-ALBUT 0.5-3(2.5) MG/3 ML] 180 mL 6     Sig: TAKE 3 ML BY NEBULIZATION EVERY 6 HOURS AS NEEDED FOR SHORTNESS OF BREATH

## 2024-06-04 LAB — RETINAL SCREEN: NEGATIVE

## 2024-08-13 DIAGNOSIS — J44.9 CHRONIC OBSTRUCTIVE PULMONARY DISEASE, UNSPECIFIED COPD TYPE (HCC): ICD-10-CM

## 2024-08-13 RX ORDER — AZITHROMYCIN 250 MG/1
TABLET, FILM COATED ORAL
Qty: 6 TABLET | Refills: 0 | Status: SHIPPED | OUTPATIENT
Start: 2024-08-13

## 2024-08-13 RX ORDER — PREDNISONE 10 MG/1
TABLET ORAL
Qty: 18 TABLET | Refills: 0 | Status: SHIPPED | OUTPATIENT
Start: 2024-08-13

## 2024-08-13 NOTE — TELEPHONE ENCOUNTER
Have we ever prescribed this med? Yes.     Last OV: 4/22/24    Next OV: None      Medications: Azithromycin & Prednisone

## 2024-09-03 PROBLEM — E10.65 UNCONTROLLED TYPE 1 DIABETES MELLITUS WITH HYPERGLYCEMIA (HCC): Status: ACTIVE | Noted: 2024-09-03

## 2024-09-04 ENCOUNTER — PATIENT MESSAGE (OUTPATIENT)
Dept: HEALTH INFORMATION MANAGEMENT | Facility: OTHER | Age: 74
End: 2024-09-04

## 2024-09-17 ENCOUNTER — TELEPHONE (OUTPATIENT)
Dept: SLEEP MEDICINE | Facility: MEDICAL CENTER | Age: 74
End: 2024-09-17
Payer: MEDICARE

## 2024-09-17 DIAGNOSIS — J44.9 CHRONIC OBSTRUCTIVE PULMONARY DISEASE, UNSPECIFIED COPD TYPE (HCC): ICD-10-CM

## 2024-09-17 DIAGNOSIS — J96.11 CHRONIC RESPIRATORY FAILURE WITH HYPOXIA (HCC): ICD-10-CM

## 2024-10-10 DIAGNOSIS — E03.9 ACQUIRED HYPOTHYROIDISM: ICD-10-CM

## 2024-10-10 DIAGNOSIS — E10.65 UNCONTROLLED TYPE 1 DIABETES MELLITUS WITH HYPERGLYCEMIA (HCC): ICD-10-CM

## 2024-10-14 RX ORDER — LEVOTHYROXINE SODIUM 112 UG/1
112 TABLET ORAL
Qty: 90 TABLET | Refills: 2 | Status: SHIPPED | OUTPATIENT
Start: 2024-10-14

## 2024-10-30 ENCOUNTER — TELEPHONE (OUTPATIENT)
Dept: HEALTH INFORMATION MANAGEMENT | Facility: OTHER | Age: 74
End: 2024-10-30

## 2024-11-21 ENCOUNTER — PATIENT MESSAGE (OUTPATIENT)
Dept: SLEEP MEDICINE | Facility: MEDICAL CENTER | Age: 74
End: 2024-11-21
Payer: MEDICARE

## 2024-11-30 DIAGNOSIS — E10.65 UNCONTROLLED TYPE 1 DIABETES MELLITUS WITH HYPERGLYCEMIA (HCC): ICD-10-CM

## 2024-12-03 RX ORDER — INSULIN DEGLUDEC 100 U/ML
60 INJECTION, SOLUTION SUBCUTANEOUS DAILY
Qty: 30 ML | Refills: 3 | Status: SHIPPED | OUTPATIENT
Start: 2024-12-03

## 2024-12-18 ENCOUNTER — TELEPHONE (OUTPATIENT)
Dept: ENDOCRINOLOGY | Facility: MEDICAL CENTER | Age: 74
End: 2024-12-18
Payer: MEDICARE

## 2024-12-18 NOTE — TELEPHONE ENCOUNTER
Pt left vm needing to reschedule tomorrow's appt due to no transportation. Called pt back and let her know that Dr. Robison is booking extremely far out and let her know that she would need to be seen every 3-6 months in order to continue getting pump supplies. Offered other appt times that were sooner, but pt said she could only come between 10am-12pm. Pt was rescheduled to June and placed on wait list.

## 2024-12-19 DIAGNOSIS — J45.41 MODERATE PERSISTENT ASTHMA WITH EXACERBATION: ICD-10-CM

## 2024-12-19 DIAGNOSIS — J44.1 COPD EXACERBATION (HCC): ICD-10-CM

## 2024-12-20 RX ORDER — IPRATROPIUM BROMIDE AND ALBUTEROL SULFATE 2.5; .5 MG/3ML; MG/3ML
3 SOLUTION RESPIRATORY (INHALATION) EVERY 6 HOURS PRN
Qty: 180 ML | Refills: 6 | Status: SHIPPED | OUTPATIENT
Start: 2024-12-20

## 2025-01-22 DIAGNOSIS — F32.0 CURRENT MILD EPISODE OF MAJOR DEPRESSIVE DISORDER WITHOUT PRIOR EPISODE (HCC): ICD-10-CM

## 2025-01-23 ENCOUNTER — TELEMEDICINE (OUTPATIENT)
Dept: SLEEP MEDICINE | Facility: MEDICAL CENTER | Age: 75
End: 2025-01-23
Attending: STUDENT IN AN ORGANIZED HEALTH CARE EDUCATION/TRAINING PROGRAM
Payer: MEDICARE

## 2025-01-23 VITALS
DIASTOLIC BLOOD PRESSURE: 50 MMHG | SYSTOLIC BLOOD PRESSURE: 111 MMHG | HEIGHT: 68 IN | BODY MASS INDEX: 35.61 KG/M2 | WEIGHT: 235 LBS

## 2025-01-23 DIAGNOSIS — J44.9 CHRONIC OBSTRUCTIVE PULMONARY DISEASE, UNSPECIFIED COPD TYPE (HCC): ICD-10-CM

## 2025-01-23 DIAGNOSIS — G47.33 OSA (OBSTRUCTIVE SLEEP APNEA): ICD-10-CM

## 2025-01-23 DIAGNOSIS — J96.11 CHRONIC RESPIRATORY FAILURE WITH HYPOXIA (HCC): ICD-10-CM

## 2025-01-23 PROCEDURE — 99214 OFFICE O/P EST MOD 30 MIN: CPT | Mod: 95 | Performed by: STUDENT IN AN ORGANIZED HEALTH CARE EDUCATION/TRAINING PROGRAM

## 2025-01-23 NOTE — PROGRESS NOTES
[facility] Virtual Follow Up Note     Date: 2025 / Time: 1:12 PM    CC: sleep follow-up    This sleep consultation is provided using Telemedicine and secure encrypted software. Consent was obtained.    Consulting MD: Matias Talbot M.D.  Requesting Physician: Blaire Perez M.D.  Patient name:      Michelle Espinoza  : 1950  MRN: [id number]     This visit was conducted via Teams using secure and encrypted videoconferencing technology.   The patient was in a private location at home in the [facility].    The patient's identity was confirmed and verbal consent was obtained for this virtual visit.      HISTORY OF PRESENT ILLNESS:     Michelle Espinoza is a 74 y.o. female with COPD, chronic respiratory failure with hypoxia, type 1 diabetes mellitus, continuous supplemental oxygen, hypothyroidism, and obstructive sleep apnea on BiPAP.  Presents to Sleep Clinic to follow-up regarding obstructive sleep apnea.    She continues to use her BiPAP machine nightly.  She is using supplemental oxygen 5.5 L/min 24 hours a day.  She is making sure to attach it to her BiPAP machine.  Overall finds her mask and pressures comfortable.  Has no acute complaints regarding her machine.  Feels that it does help her sleep.    DME provider: [facility]  Device: Aircurve 10   Mask: nasal   Aerophagia: No   Snoring: No   Dry mouth: yes   Leak: No   Skin irritation: No   Chin strap: No         Sleep History  2021 PSG showed mild obstructive sleep apnea overall 4% AHI 12.27, minimum oxygen saturation 79%.  2022 PSG titration study showed improvement with PAP therapy and she did best with BiPAP EPAP 7 IPAP 11, there was persistent nocturnal hypoxia with Pap therapy and was recommended she continue supplemental oxygen with Pap therapy         MEDICAL HISTORY  Past Medical History:   Diagnosis Date    Anesthesia     PONV    Arthritis     ASTHMA     Cancer (HCC)     history of breast cancer     Chickenpox     Cough     Dental disorder     dentures, full upper and lower    Diabetes     insulin pump    Diabetic neuropathy (HCC)     EMPHYSEMA     Encounter for long-term (current) use of insulin (HCC) 04/23/2014    Epilepsy (HCC)     Fitting and adjustment of insulin pump 04/23/2014    Heart burn     Mumps     osteopenia 04/23/2014    Pain     hip    Psychiatric problem     depression    Shortness of breath     Sleep apnea     CPAP    Unspecified disorder of thyroid     hypothyroid    Wheezing         SURGICAL HISTORY  Past Surgical History:   Procedure Laterality Date    HIP ARTHROPLASTY TOTAL  9/9/2013    Performed by Pedro Warren M.D. at SURGERY [facility]    BREAST RECONSTRUCTION  2001    MASTECTOMY  1992    right, full    MASTECTOMY  1987, 2001    partial, left x2        FAMILY HISTORY  Family History   Problem Relation Age of Onset    Lung Disease Other        SOCIAL HISTORY  Social History     Socioeconomic History    Marital status:    Tobacco Use    Smoking status: Every Day     Current packs/day: 1.00     Average packs/day: 1 pack/day for 40.0 years (40.0 ttl pk-yrs)     Types: Cigarettes    Smokeless tobacco: Never   Vaping Use    Vaping status: Never Used   Substance and Sexual Activity    Alcohol use: Yes     Comment: 2 per day    Drug use: No        CURRENT MEDICATIONS  Current Outpatient Medications   Medication Sig Dispense Refill    FLUoxetine (PROZAC) 20 MG Cap TAKE 1 CAPSULE BY MOUTH EVERY DAY 90 Capsule 1    ipratropium-albuterol (DUONEB) 0.5-2.5 (3) MG/3ML nebulizer solution TAKE 3 ML BY NEBULIZATION EVERY 6 HOURS AS NEEDED FOR SHORTNESS OF BREATH 180 mL 6    TRESIBA FLEXTOUCH 100 UNIT/ML Solution Pen-injector INJECT 60 UNITS UNDER THE SKIN EVERY DAY. 90 DAY 30 mL 3    Continuous Glucose Sensor (FREESTYLE MIKEY 2 SENSOR) [facility] [place] 1 SENSOR AND USE TO CHECK BLOOD GLUCOSE CONTINUOUSLY, CHANGE EVERY 14 DAYS 6 Each 3    levothyroxine (SYNTHROID) 112 MCG Tab TAKE 1 TABLET BY  MOUTH EVERY DAY IN THE MORNING ON AN EMPTY STOMACH 90 Tablet 2    azithromycin (ZITHROMAX) 250 MG Tab Take 2 tablets on day 1, then take 1 tablet a day for 4 days. 6 Tablet 0    predniSONE (DELTASONE) 10 MG Tab Take 30mg x 3 days, then take 20mg x 3 days, then take 10mg x 3 days, with food, then discontinue. 18 Tablet 0    NOVOLOG 100 UNIT/ML Solution Inject 10 Units under the skin 3 times a day before meals. 20 mL 11    Budeson-Glycopyrrol-Formoterol (BREZTRI AEROSPHERE) 160-9-4.8 MCG/ACT Aerosol Inhale 2 Puffs 2 times a day. 10.7 g 12    albuterol 108 (90 Base) MCG/ACT Aero Soln inhalation aerosol Inhale 1-2 Puffs every four hours as needed for Shortness of Breath. 8.5 g 6    Continuous Blood Gluc  (FREESTYLE MIKEY 2 READER) Device 1 Each continuous. 1 Each 1    captopril (CAPOTEN) 25 MG Tab Take 25 mg by mouth 2 times a day.      celecoxib (CELEBREX) 200 MG Cap Take 200 mg by mouth every day.      Multiple Vitamins-Minerals (CENTRUM SILVER 50+WOMEN) Tab Take 1 Tablet by mouth every day.      esomeprazole (NEXIUM) 20 MG capsule Take 20 mg by mouth 2 times a day.      montelukast (SINGULAIR) 10 MG Tab Take 10 mg by mouth every day.      Cyanocobalamin (VITAMIN B 12 PO) Take 1 Tablet by mouth every day.      Cholecalciferol (VITAMIN D) 2000 UNIT Tab Take 4,000 Units by mouth every day.      cetirizine (ZYRTEC) 10 MG Tab Take 10 mg by mouth 2 times a day.      B Complex Cap Take 1 Cap by mouth every day.      raloxifene (EVISTA) 60 MG Tab Take 60 mg by mouth every day.      aspirin EC (ECOTRIN) 81 MG TBEC Take 81 mg by mouth every day.      lovastatin (MEVACOR) 20 MG TABS Take 20 mg by mouth every evening.       No current facility-administered medications for this visit.       REVIEW OF SYSTEMS  Constitutional: Denies fevers, Denies weight changes  Ears/Nose/Throat/Mouth: Denies nasal congestion or sore throat   Cardiovascular: Denies chest pain  Respiratory: chronic shortness of breath, Denies  "cough  Gastrointestinal/Hepatic: Denies nausea, vomiting  Sleep: see HPI      Physical Examination:  Vitals/ General Appearance:   Weight/BMI: Body mass index is 35.73 kg/m².  /50 (BP Location: Left arm, Patient Position: Sitting, BP Cuff Size: Adult)   Ht 1.727 m (5' 8\")   Wt 107 kg (235 lb)   Vitals:    01/23/25 1307   BP: 111/50   BP Location: Left arm   Patient Position: Sitting   BP Cuff Size: Adult   Weight: 107 kg (235 lb)   Height: 1.727 m (5' 8\")       General: alert and oriented to time, place and person. Cooperative and in no apparent distress.   Constitutional: Alert, no distress, well-groomed.  Voice: normal volume and orville  Head: normocephalic   Pulmonary: Voice normal volume and orville. No sounds or signs of increased work of breathing.   Neurologic: No involuntary movements     Assessment and Plan  Michelle Espinoza is a 74 y.o. female with COPD, chronic respiratory failure with hypoxia, type 1 diabetes mellitus, continuous supplemental oxygen, hypothyroidism, and obstructive sleep apnea on BiPAP.  Presents to Sleep Clinic to follow-up regarding obstructive sleep apnea.    The medical record was reviewed.    Obstructive sleep apnea  Compliance data reviewed showing 100% usage > 4hours in last 30  days. Average AHI 0.1 events/hour. Pt continues to use and benefit from machine.      Current settings BiPAP 11/7    PLAN:   -Order placed for supply renewal   -Advised to reach out via MyChart with any questions     Has been advised to continue the current BiPAP with supplemental oxygen, clean equipment frequently, and get new mask and supplies as allowed by insurance and DME. Recommend an earlier appointment, if significant treatment barriers develop.    Patients with MICHAELA are at increased risk of cardiovascular disease including coronary artery disease, systemic arterial hypertension, pulmonary arterial hypertension, cardiac arrythmias, and stroke.     Chronic respiratory failure with " hypoxia  Chronic, stable. Continue with current defined treatment plan per pulmonology. Follow-up at least annually.    COPD  Chronic, stable. Continue with current defined treatment plan per pulmonology. Follow-up at least annually.    Have advised the patient to follow up with the appropriate healthcare practitioners for all other medical problems and issues.    Return in about 1 year (around 1/23/2026).      Please note portions of this record was created using voice recognition software. I have made every reasonable attempt to correct obvious errors, but I expect that there are errors of grammar and possibly content I did not discover before finalizing the note.

## 2025-02-05 ENCOUNTER — TELEMEDICINE (OUTPATIENT)
Dept: SLEEP MEDICINE | Facility: MEDICAL CENTER | Age: 75
End: 2025-02-05
Attending: INTERNAL MEDICINE
Payer: MEDICARE

## 2025-02-05 VITALS
WEIGHT: 235 LBS | BODY MASS INDEX: 35.61 KG/M2 | HEIGHT: 68 IN | HEART RATE: 59 BPM | DIASTOLIC BLOOD PRESSURE: 54 MMHG | OXYGEN SATURATION: 97 % | SYSTOLIC BLOOD PRESSURE: 123 MMHG

## 2025-02-05 DIAGNOSIS — Z72.0 TOBACCO USE: ICD-10-CM

## 2025-02-05 DIAGNOSIS — G47.33 OSA (OBSTRUCTIVE SLEEP APNEA): ICD-10-CM

## 2025-02-05 DIAGNOSIS — J44.9 CHRONIC OBSTRUCTIVE PULMONARY DISEASE, UNSPECIFIED COPD TYPE (HCC): ICD-10-CM

## 2025-02-05 DIAGNOSIS — J96.11 CHRONIC RESPIRATORY FAILURE WITH HYPOXIA (HCC): ICD-10-CM

## 2025-02-05 PROCEDURE — 99213 OFFICE O/P EST LOW 20 MIN: CPT | Mod: 95 | Performed by: INTERNAL MEDICINE

## 2025-02-05 RX ORDER — ALBUTEROL SULFATE 90 UG/1
1-2 INHALANT RESPIRATORY (INHALATION) EVERY 4 HOURS PRN
Qty: 8.5 G | Refills: 6 | Status: SHIPPED | OUTPATIENT
Start: 2025-02-05

## 2025-02-05 RX ORDER — FLUTICASONE FUROATE, UMECLIDINIUM BROMIDE AND VILANTEROL TRIFENATATE 200; 62.5; 25 UG/1; UG/1; UG/1
1 POWDER RESPIRATORY (INHALATION) DAILY
Qty: 3 EACH | Refills: 3 | Status: SHIPPED | OUTPATIENT
Start: 2025-02-05

## 2025-02-05 ASSESSMENT — ENCOUNTER SYMPTOMS
EYE PAIN: 0
FEVER: 0
PND: 0
BACK PAIN: 0
WEIGHT LOSS: 0
SPEECH CHANGE: 0
WEAKNESS: 0
ABDOMINAL PAIN: 0
VOMITING: 0
TREMORS: 0
SHORTNESS OF BREATH: 1
DIAPHORESIS: 0
CONSTIPATION: 0
BLURRED VISION: 0
COUGH: 0
CLAUDICATION: 0
FOCAL WEAKNESS: 0
SINUS PAIN: 0
HEMOPTYSIS: 0
EYE REDNESS: 0
WHEEZING: 0
MYALGIAS: 0
DIARRHEA: 0
SORE THROAT: 0
PHOTOPHOBIA: 0
ORTHOPNEA: 0
PALPITATIONS: 0
NECK PAIN: 0
HEADACHES: 0
FALLS: 0
DOUBLE VISION: 0
HEARTBURN: 0
STRIDOR: 0
CHILLS: 0
DEPRESSION: 0
DIZZINESS: 0
NAUSEA: 0
EYE DISCHARGE: 0
SPUTUM PRODUCTION: 0

## 2025-02-05 NOTE — PROGRESS NOTES
Chief Complaint   Patient presents with    Follow-Up     LAST SEEN 4/22/24     This evaluation was conducted via Teams using secure and encrypted videoconferencing technology. The patient was in their home in the state [facility].    The patient's identity was confirmed and verbal consent was obtained for this virtual visit.     HPI: This patient is a 74 y.o. female whom is followed in our clinic for COPD c/b chronic hypoxemic respiratory failure last seen by me on 4/22/24. She also has MICHAELA on Bipap followed by sleep medicine, recently seen virtulally by Dr Talbot on 1/23/25. Other PMHx includes type 1 diabetes, hypothyroid, dyslipidemia.  She is a current tobacco smoker with greater than 40-pack-year history currently smoking just under a pack per day. She has been on Spiriva and Qvar in the past and we transitioned her to Trelegy 100 which was replaced with breztri. Most recently cost has gone up on this. No exacerbations in over a year. SHe uses nebulized bronchodilators up to 4x daily. O2 needs are stable at 5LPM .  She was hospitalized for pneumonia and E. coli bacteremia in late August and early September of 2021  CT chest at that time showed emphysematous changes with patchy groundglass infiltrates bilaterally involving both upper and lower lobes. Repeat CT 10/2022 showed clearance of infiltrate but also showed R thyroid nodule 15 mm in size and hypoattenuation of the liver and focal densisities more prominent than on prior CT. Biopsy of thyroid nodule was benign. PFTs from 10/2022 showed severe air flow obstruction with FEV1 post BD of 36% pred, +BD response, normal TLC at 106% pred, air trapping with RV of 164% pred and DLCO of 57% pred. Functionally she is MMRC 3-4. NO acute concerns today.    Past Medical History:   Diagnosis Date    Anesthesia     PONV    Arthritis     ASTHMA     Cancer (HCC)     history of breast cancer    Chickenpox     Cough     Dental disorder     dentures, full upper and lower     Diabetes     insulin pump    Diabetic neuropathy (HCC)     EMPHYSEMA     Encounter for long-term (current) use of insulin (HCC) 04/23/2014    Epilepsy (HCC)     Fitting and adjustment of insulin pump 04/23/2014    Heart burn     Mumps     osteopenia 04/23/2014    Pain     hip    Psychiatric problem     depression    Shortness of breath     Sleep apnea     CPAP    Unspecified disorder of thyroid     hypothyroid    Wheezing        Social History     Socioeconomic History    Marital status:      Spouse name: Not on file    Number of children: Not on file    Years of education: Not on file    Highest education level: Not on file   Occupational History    Not on file   Tobacco Use    Smoking status: Every Day     Current packs/day: 1.00     Average packs/day: 1 pack/day for 40.0 years (40.0 ttl pk-yrs)     Types: Cigarettes    Smokeless tobacco: Never   Vaping Use    Vaping status: Never Used   Substance and Sexual Activity    Alcohol use: Yes     Comment: 2 per day    Drug use: No    Sexual activity: Not on file   Other Topics Concern    Not on file   Social History Narrative    Not on file     Social Drivers of Health     Financial Resource Strain: Not on file   Food Insecurity: Not on file   Transportation Needs: Not on file   Physical Activity: Not on file   Stress: Not on file   Social Connections: Not on file   Intimate Partner Violence: Not on file   Housing Stability: Not on file       Family History   Problem Relation Age of Onset    Lung Disease Other        Current Outpatient Medications on File Prior to Visit   Medication Sig Dispense Refill    FLUoxetine (PROZAC) 20 MG Cap TAKE 1 CAPSULE BY MOUTH EVERY DAY 90 Capsule 1    ipratropium-albuterol (DUONEB) 0.5-2.5 (3) MG/3ML nebulizer solution TAKE 3 ML BY NEBULIZATION EVERY 6 HOURS AS NEEDED FOR SHORTNESS OF BREATH 180 mL 6    TRESIBA FLEXTOUCH 100 UNIT/ML Solution Pen-injector INJECT 60 UNITS UNDER THE SKIN EVERY DAY. 90 DAY 30 mL 3    Continuous  Glucose Sensor (FREESTYLE MIKEY 2 SENSOR) [facility] APPLY 1 SENSOR AND USE TO CHECK BLOOD GLUCOSE CONTINUOUSLY, CHANGE EVERY 14 DAYS 6 Each 3    levothyroxine (SYNTHROID) 112 MCG Tab TAKE 1 TABLET BY MOUTH EVERY DAY IN THE MORNING ON AN EMPTY STOMACH 90 Tablet 2    azithromycin (ZITHROMAX) 250 MG Tab Take 2 tablets on day 1, then take 1 tablet a day for 4 days. 6 Tablet 0    predniSONE (DELTASONE) 10 MG Tab Take 30mg x 3 days, then take 20mg x 3 days, then take 10mg x 3 days, with food, then discontinue. 18 Tablet 0    NOVOLOG 100 UNIT/ML Solution Inject 10 Units under the skin 3 times a day before meals. 20 mL 11    Continuous Blood Gluc  (FREESTYLE MIKEY 2 READER) Device 1 Each continuous. 1 Each 1    captopril (CAPOTEN) 25 MG Tab Take 25 mg by mouth 2 times a day.      celecoxib (CELEBREX) 200 MG Cap Take 200 mg by mouth every day.      Multiple Vitamins-Minerals (CENTRUM SILVER 50+WOMEN) Tab Take 1 Tablet by mouth every day.      esomeprazole (NEXIUM) 20 MG capsule Take 20 mg by mouth 2 times a day.      montelukast (SINGULAIR) 10 MG Tab Take 10 mg by mouth every day.      Cyanocobalamin (VITAMIN B 12 PO) Take 1 Tablet by mouth every day.      Cholecalciferol (VITAMIN D) 2000 UNIT Tab Take 4,000 Units by mouth every day.      cetirizine (ZYRTEC) 10 MG Tab Take 10 mg by mouth 2 times a day.      B Complex Cap Take 1 Cap by mouth every day.      raloxifene (EVISTA) 60 MG Tab Take 60 mg by mouth every day.      aspirin EC (ECOTRIN) 81 MG TBEC Take 81 mg by mouth every day.      lovastatin (MEVACOR) 20 MG TABS Take 20 mg by mouth every evening.       No current facility-administered medications on file prior to visit.       Bactrim [sulfamethoxazole w-trimethoprim] and Percocet [oxycodone-acetaminophen]      ROS:   Review of Systems   Constitutional:  Negative for chills, diaphoresis, fever, malaise/fatigue and weight loss.   HENT:  Negative for congestion, ear discharge, ear pain, hearing loss, nosebleeds, sinus  "pain, sore throat and tinnitus.    Eyes:  Negative for blurred vision, double vision, photophobia, pain, discharge and redness.   Respiratory:  Positive for shortness of breath. Negative for cough, hemoptysis, sputum production, wheezing and stridor.    Cardiovascular:  Negative for chest pain, palpitations, orthopnea, claudication, leg swelling and PND.   Gastrointestinal:  Negative for abdominal pain, constipation, diarrhea, heartburn, nausea and vomiting.   Genitourinary:  Negative for dysuria and urgency.   Musculoskeletal:  Negative for back pain, falls, joint pain, myalgias and neck pain.   Skin:  Negative for itching and rash.   Neurological:  Negative for dizziness, tremors, speech change, focal weakness, weakness and headaches.   Endo/Heme/Allergies:  Negative for environmental allergies.   Psychiatric/Behavioral:  Negative for depression.        /54 (BP Location: Left arm, Patient Position: Sitting, BP Cuff Size: Adult)   Pulse (!) 59   Ht 1.727 m (5' 8\")   Wt 107 kg (235 lb)   SpO2 97%   Physical Exam  Constitutional:       General: She is not in acute distress.     Appearance: Normal appearance. She is well-developed and normal weight.   HENT:      Head: Normocephalic and atraumatic.      Right Ear: External ear normal.      Left Ear: External ear normal.      Nose: Nose normal. No congestion.      Mouth/Throat:      Mouth: Mucous membranes are moist.      Pharynx: Oropharynx is clear. No oropharyngeal exudate.   Eyes:      General: No scleral icterus.     Extraocular Movements: Extraocular movements intact.      Conjunctiva/sclera: Conjunctivae normal.      Pupils: Pupils are equal, round, and reactive to light.   Neck:      Vascular: No JVD.      Trachea: No tracheal deviation.   Pulmonary:      Effort: No accessory muscle usage.   Skin:     Nails: There is no clubbing.   Neurological:      Mental Status: She is alert and oriented to person, place, and time.      Cranial Nerves: No cranial " nerve deficit.      Gait: Gait normal.   Psychiatric:         Behavior: Behavior normal.       PFTs as reviewed by me personally: as per hPI    Imaging as reviewed by me personally:  as per hPI     Assessment:  1. Chronic obstructive pulmonary disease, unspecified COPD type (HCC)  fluticasone-umeclidinium-vilanterol (TRELEGY ELLIPTA) 200-62.5-25 mcg/act inhaler    albuterol 108 (90 Base) MCG/ACT Aero Soln inhalation aerosol      2. Chronic respiratory failure with hypoxia (HCC)        3. MICHAELA (obstructive sleep apnea)        4. Tobacco use            Plan:  Chronic, severe. Stable but continues to smoke. Counseled on tobacco cessation. Will change to trelegy given breztri appears to no longer be on formulary. Continue COREY via MDI and nebulizer. Continue supplemental O2.  Chronic and 2/2 COPD. O2 needs are stable at 5LPM and pt is both compliant with and benefiting from this.   Followed by sleep medicine. Compliant with and benefiting from BIPAP  Counseled on complete tobacco cessation. Declined lung Ca screening  Return in about 1 year (around 2/5/2026) for copd.

## 2025-02-12 NOTE — PROGRESS NOTES
Med rec PARTIALLY completed with patient at bedside and per Hx  Medications updated, but unsure of last doses.    [Time Spent: ___ minutes] : I have spent [unfilled] minutes of time on the encounter which excludes teaching and separately reported services.

## 2025-03-13 ENCOUNTER — TELEPHONE (OUTPATIENT)
Dept: ENDOCRINOLOGY | Facility: MEDICAL CENTER | Age: 75
End: 2025-03-13
Payer: MEDICARE

## 2025-03-15 ENCOUNTER — HOSPITAL ENCOUNTER (OUTPATIENT)
Dept: LAB | Facility: MEDICAL CENTER | Age: 75
End: 2025-03-15
Attending: INTERNAL MEDICINE
Payer: MEDICARE

## 2025-03-15 DIAGNOSIS — Z79.4 ENCOUNTER FOR LONG-TERM (CURRENT) USE OF INSULIN (HCC): ICD-10-CM

## 2025-03-15 DIAGNOSIS — E78.5 DYSLIPIDEMIA: ICD-10-CM

## 2025-03-15 DIAGNOSIS — E04.1 THYROID NODULE: ICD-10-CM

## 2025-03-15 DIAGNOSIS — E03.9 ACQUIRED HYPOTHYROIDISM: ICD-10-CM

## 2025-03-15 DIAGNOSIS — E10.65 UNCONTROLLED TYPE 1 DIABETES MELLITUS WITH HYPERGLYCEMIA (HCC): ICD-10-CM

## 2025-03-15 DIAGNOSIS — E55.9 VITAMIN D DEFICIENCY: ICD-10-CM

## 2025-03-15 LAB
25(OH)D3 SERPL-MCNC: 64 NG/ML (ref 30–100)
ALBUMIN SERPL BCP-MCNC: 4 G/DL (ref 3.2–4.9)
ALBUMIN/GLOB SERPL: 1.4 G/DL
ALP SERPL-CCNC: 88 U/L (ref 30–99)
ALT SERPL-CCNC: 18 U/L (ref 2–50)
ANION GAP SERPL CALC-SCNC: 9 MMOL/L (ref 7–16)
AST SERPL-CCNC: 22 U/L (ref 12–45)
BILIRUB SERPL-MCNC: 0.4 MG/DL (ref 0.1–1.5)
BUN SERPL-MCNC: 11 MG/DL (ref 8–22)
CALCIUM ALBUM COR SERPL-MCNC: 9.4 MG/DL (ref 8.5–10.5)
CALCIUM SERPL-MCNC: 9.4 MG/DL (ref 8.5–10.5)
CHLORIDE SERPL-SCNC: 95 MMOL/L (ref 96–112)
CO2 SERPL-SCNC: 27 MMOL/L (ref 20–33)
CREAT SERPL-MCNC: 0.87 MG/DL (ref 0.5–1.4)
GFR SERPLBLD CREATININE-BSD FMLA CKD-EPI: 70 ML/MIN/1.73 M 2
GLOBULIN SER CALC-MCNC: 2.8 G/DL (ref 1.9–3.5)
GLUCOSE SERPL-MCNC: 165 MG/DL (ref 65–99)
POTASSIUM SERPL-SCNC: 5.4 MMOL/L (ref 3.6–5.5)
PROT SERPL-MCNC: 6.8 G/DL (ref 6–8.2)
SODIUM SERPL-SCNC: 131 MMOL/L (ref 135–145)
T4 FREE SERPL-MCNC: 1.23 NG/DL (ref 0.93–1.7)
TSH SERPL-ACNC: 1.02 UIU/ML (ref 0.35–5.5)

## 2025-03-15 PROCEDURE — 82306 VITAMIN D 25 HYDROXY: CPT

## 2025-03-15 PROCEDURE — 84443 ASSAY THYROID STIM HORMONE: CPT

## 2025-03-15 PROCEDURE — 36415 COLL VENOUS BLD VENIPUNCTURE: CPT

## 2025-03-15 PROCEDURE — 80053 COMPREHEN METABOLIC PANEL: CPT

## 2025-03-15 PROCEDURE — 84439 ASSAY OF FREE THYROXINE: CPT

## 2025-03-21 ENCOUNTER — OFFICE VISIT (OUTPATIENT)
Dept: ENDOCRINOLOGY | Facility: MEDICAL CENTER | Age: 75
End: 2025-03-21
Attending: INTERNAL MEDICINE
Payer: MEDICARE

## 2025-03-21 VITALS
BODY MASS INDEX: 35.73 KG/M2 | HEART RATE: 88 BPM | DIASTOLIC BLOOD PRESSURE: 67 MMHG | HEIGHT: 68 IN | OXYGEN SATURATION: 94 % | SYSTOLIC BLOOD PRESSURE: 142 MMHG

## 2025-03-21 DIAGNOSIS — E10.65 UNCONTROLLED TYPE 1 DIABETES MELLITUS WITH HYPERGLYCEMIA (HCC): ICD-10-CM

## 2025-03-21 DIAGNOSIS — I10 ESSENTIAL HYPERTENSION: ICD-10-CM

## 2025-03-21 DIAGNOSIS — E04.1 THYROID NODULE: ICD-10-CM

## 2025-03-21 DIAGNOSIS — Z79.4 ENCOUNTER FOR LONG-TERM (CURRENT) USE OF INSULIN (HCC): ICD-10-CM

## 2025-03-21 DIAGNOSIS — E03.9 ACQUIRED HYPOTHYROIDISM: ICD-10-CM

## 2025-03-21 DIAGNOSIS — E55.9 VITAMIN D DEFICIENCY: ICD-10-CM

## 2025-03-21 DIAGNOSIS — E78.5 DYSLIPIDEMIA: ICD-10-CM

## 2025-03-21 PROBLEM — E10.49 TYPE 1 DIABETES MELLITUS WITH OTHER DIABETIC NEUROLOGICAL COMPLICATION (HCC): Status: ACTIVE | Noted: 2025-03-21

## 2025-03-21 LAB
HBA1C MFR BLD: 6.7 % (ref ?–5.8)
POCT INT CON NEG: NEGATIVE
POCT INT CON POS: POSITIVE

## 2025-03-21 PROCEDURE — 99211 OFF/OP EST MAY X REQ PHY/QHP: CPT | Performed by: INTERNAL MEDICINE

## 2025-03-21 PROCEDURE — 83036 HEMOGLOBIN GLYCOSYLATED A1C: CPT | Performed by: INTERNAL MEDICINE

## 2025-03-21 RX ORDER — CAPTOPRIL 50 MG/1
50 TABLET ORAL 2 TIMES DAILY
Qty: 200 TABLET | Refills: 3 | Status: SHIPPED | OUTPATIENT
Start: 2025-03-21

## 2025-03-21 RX ORDER — BLOOD-GLUCOSE SENSOR
1 EACH MISCELLANEOUS
Qty: 2 EACH | Refills: 11 | Status: SHIPPED | OUTPATIENT
Start: 2025-03-21

## 2025-03-21 RX ORDER — INSULIN DEGLUDEC 100 U/ML
60 INJECTION, SOLUTION SUBCUTANEOUS DAILY
Qty: 30 ML | Refills: 11 | Status: SHIPPED | OUTPATIENT
Start: 2025-03-21

## 2025-03-21 RX ORDER — INSULIN ASPART 100 [IU]/ML
10 INJECTION, SOLUTION INTRAVENOUS; SUBCUTANEOUS
Qty: 15 ML | Refills: 11 | Status: SHIPPED | OUTPATIENT
Start: 2025-03-21

## 2025-03-21 NOTE — PROGRESS NOTES
CHIEF COMPLAINT: Patient is here for follow up of Type 1 Diabetes Mellitus    HPI:     Michelle Espinoza is a 74 y.o. female with Type 1 Diabetes Mellitus here for follow up.        Labs from 3/21/2025 show A1c is 6.7%  Labs from May 23, 2024 show A1c is 6.8%  Labs from 1/24/2024 hba1c was 7.2%  Labs from 4/14/2023 Hba1c was 6.9%  Labs from 10/14/2022 HbA1c was 6.5%      She was diagnosed with type 1 diabetes over 40 years ago.  She was previously on an insulin pump but stopped utilizing it because of cost related issues.  She is currently on multiple daily insulin injections.  Major comorbid issues include COPD predominantly emphysematous,  Hyperlipidemia, hypertension, and primary hypothyroidism.  She is oxygen dependent.  She has been wearing a CGM for over 6 months          Currently she is on:  Tresiba 46 units daily  fiasp 1 unit for every 5 g (average 15u bfast and 8-10 for lunch and  0-8 for dinner  correction scale 1 unit for every 50 greater than 150        She had to cancel an appt as her daughter had pleurisy  She relies on her family for her ride      She denies side effects with her medications  Download of her CGM shows               Incidentally she had a CT scan of her chest on October 3, 2022 which showed an incidental thyroid nodule in the posterior portion of the right lobe.   US in the office on Jan 2024 showed a suspicious nodule  Nodule #1  There is a(n) hypoechoic taller than wide solid nodule with smooth margins and no echogenic foci measuring 1.29 x 1.23 x 0.90 cm located on the right lower lobe.  FNAB on this RLL solid nodule was benign          For her hyperlipidemia she is on lovastatin 20 mg daily   She denies any side effects.   Latest Reference Range & Units 05/16/24 09:51   Cholesterol,Tot 100 - 199 mg/dL 165   Triglycerides 0 - 149 mg/dL 97   HDL >=40 mg/dL 64   LDL <100 mg/dL 82         She does not have diabetic kidney disease.  She has hypertension and is on captopril 25 mg twice a  day   Latest Reference Range & Units 05/16/24 09:51   Micro Alb Creat Ratio 0 - 30 mg/g see below   Creatinine, Urine mg/dL 40.51   Microalbumin, Urine Random mg/dL <1.2       POCT eye exam was done today     She denies peripheral neuropathy symptoms        With respect to her primary hypothyroidism   She is on levothyroxine 112 MCG daily  She reports good compliance  TSH is 1.0 on 3/2025  TSH is 1.57 on 5/2024  TSH was 2.5  on 4/2023   TSH  was1.2 on October 12, 2022          BG Diary:  Patient forgot her glucose meter    Weight has been stable    Diabetes Complications   Retinopathy: No known retinopathy.  Last eye exam: Patient will have an eye exam on November 1, 2022 at [facility]  Neuropathy: Denies paresthesias or numbness in hands or feet. Denies any foot wounds.  Exercise: Minimal.  Diet: Fair.  Patient's medications, allergies, and social histories were reviewed and updated as appropriate.    ROS:     CONS:     No fever, no chills   EYES:     No diplopia, no blurry vision   CV:           No chest pain, no palpitations   PULM:     No SOB, no cough, no hemoptysis.   GI:            No nausea, no vomiting, no diarrhea, no constipation   ENDO:     No polyuria, no polydipsia, no heat intolerance, no cold intolerance       Past Medical History:  Problem List:  2023-08: Chronic respiratory failure with hypoxia ([facility])  2021-09: MICHAELA (obstructive sleep apnea)  2021-09: Bacteremia  2021-08: Sepsis ([facility])  2021-08: Thrombocytopenia ([facility])  2016-12: Influenza A  2016-12: COPD (chronic obstructive pulmonary disease) ([facility])  2016-12: Osteoporosis  2016-12: Pneumonia  2016-12: Acute on chronic respiratory failure with hypoxia ([facility])  2016-10: Hypotension  2016-10: Dehydration  2016-10: Tobacco abuse  2014-11: Insulin pump status  2014-08: Type 1 diabetes mellitus with neurological manifestations,   uncontrolled ([facility])  2014-04: Disorder of bone and cartilage  2014-04: Fitting and adjustment of insulin pump  2014-04:  "Encounter for long-term (current) use of insulin ([facility])  2014-02: Low back pain  2013-09: Acquired hypothyroidism  2013-09: Obesity  2013-09: Postoperative anemia due to acute blood loss  2013-09: Hip pain  2013-08: Hypertension  2013-08: Dyslipidemia  2013-08: Vitamin D deficiency  2013-08: Uncontrolled type 1 diabetes mellitus      Past Surgical History:  Past Surgical History:   Procedure Laterality Date    HIP ARTHROPLASTY TOTAL  9/9/2013    Performed by Pedro Warren M.D. at SURGERY [facility]    BREAST RECONSTRUCTION  2001    MASTECTOMY  1992    right, full    MASTECTOMY  1987, 2001    partial, left x2        Allergies:  Bactrim [sulfamethoxazole w-trimethoprim] and Percocet [oxycodone-acetaminophen]     Social History:  Social History     Tobacco Use    Smoking status: Every Day     Current packs/day: 1.00     Average packs/day: 1 pack/day for 40.0 years (40.0 ttl pk-yrs)     Types: Cigarettes    Smokeless tobacco: Never   Vaping Use    Vaping status: Never Used   Substance Use Topics    Alcohol use: Yes     Comment: 2 per day    Drug use: No        Family History:   family history includes Lung Disease in an other family member.      PHYSICAL EXAM:   OBJECTIVE:  Vital signs: /68 (BP Location: Right arm, Patient Position: Sitting, BP Cuff Size: Large adult)   Pulse 73   Resp 17   Ht 1.727 m (5' 8\")   Wt 107 kg (236 lb)   SpO2 94% Comment: 4 liters o2  BMI 35.88 kg/m²   GENERAL: Well-developed, well-nourished in no apparent distress.   EYE:  No ocular asymmetry, PERRLA  HENT: Pink, moist mucous membranes.    NECK: Thyroid exam was difficult as her neck is short and wide  CARDIOVASCULAR:  No murmurs  LUNGS: Clear breath sounds  ABDOMEN: Soft, nontender   EXTREMITIES: No clubbing, cyanosis, or edema.   NEUROLOGICAL: No gross focal motor abnormalities   LYMPH: No cervical adenopathy palpated.   SKIN: No rashes, lesions.     Labs:  Lab Results   Component Value Date/Time    HBA1C 6.8 (A) " 2024 11:19 AM        Lab Results   Component Value Date/Time    WBC 11.9 (H) 2021 10:10 PM    RBC 3.96 (L) 2021 10:10 PM    HEMOGLOBIN 12.9 2021 10:10 PM    MCV 96.0 2021 10:10 PM    MCH 32.6 2021 10:10 PM    MCHC 33.9 2021 10:10 PM    RDW 45.1 2021 10:10 PM    MPV 10.4 2021 10:10 PM       Lab Results   Component Value Date/Time    SODIUM 135 2024 09:51 AM    POTASSIUM 4.4 2024 09:51 AM    CHLORIDE 99 2024 09:51 AM    CO2 25 2024 09:51 AM    ANION 11.0 2024 09:51 AM    GLUCOSE 135 (H) 2024 09:51 AM    BUN 16 2024 09:51 AM    CREATININE 0.80 2024 09:51 AM    CALCIUM 8.9 2024 09:51 AM    ASTSGOT 17 2024 09:51 AM    ALTSGPT 14 2024 09:51 AM    TBILIRUBIN 0.3 2024 09:51 AM    ALBUMIN 3.8 2024 09:51 AM    TOTPROTEIN 6.3 2024 09:51 AM    GLOBULIN 2.5 2024 09:51 AM    AGRATIO 1.5 2024 09:51 AM       Lab Results   Component Value Date/Time    CHOLSTRLTOT 187 10/12/2022 0925    TRIGLYCERIDE 129 10/12/2022 0925    HDL 73 10/12/2022 0925    LDL 88 10/12/2022 0925       Lab Results   Component Value Date/Time    MALBCRT see below 2024 09:51 AM    MICROALBUR <1.2 2024 09:51 AM        Lab Results   Component Value Date/Time    TSHULTRASEN 1.220 10/12/2022 0925     No results found for: FREEDIR  Lab Results   Component Value Date/Time    FREET3 2.91 10/12/2022 0925     No results found for: THYSTIMIG      IMAGIN2024 9:35 AM     HISTORY/REASON FOR EXAM:  Evaluation study for thyroid nodule incidentally detected on CT scan        TECHNIQUE/EXAM DESCRIPTION:  Ultrasound of the soft tissues of the head and neck.     COMPARISON:  None     FINDINGS:  The thyroid gland is heterogeneous.  Vascularity is normal.     The right lobe of the thyroid gland measures 4.26 cm x 1.66 cm x 1.15 cm cm.  The left lobe of the thyroid gland measures 2.37 cm x 0.84 cm x 0.96 cm cm.  The  isthmus measures 0.18 cm cm.     Nodules >= 1cm:        Nodule #1  There is a(n) hypoechoic taller than wide solid nodule with smooth margins and no echogenic foci measuring 1.29 x 1.23 x 0.90 cm located on the right lower lobe.              IMPRESSION:     Heterogenous thyroid gland with dominant: intermediate suspicion hypoechoic solid nodule measuring 1.29 cm located on the right lower lobe     DWIGHT Recommendations  Schedule for FNA of suspicious nodule in the right lower lobe                 US report completed and dictated by  Keny Robison MD, FACE, ECNU        ASSESSMENT/PLAN:     1. Uncontrolled type 1 diabetes mellitus with hyperglycemia (HCC)  Stable control  A1c  is 6.7%  Her CGM was downloaded and reviewed  Continue Tresiba 46 units daily  Continue Fiasp 15 for b fast   8-10  for lunch and continue 0-8u for dinner  Continue same correction scale  She needs to get an updated fasting lipid CMP urine albumin and eye exam  Follow-up with me in 6 months    2. Acquired hypothyroidism  Controlled  Continue levothyroxine 112 MCG daily  Repeat TSH in 6 months    3. Dyslipidemia  Controlled  Continue lovastatin  Repeat fasting lipids this month    4. Vitamin D deficiency  Stable  Continue monitoring vitamin D levels    5. Encounter for long-term (current) use of insulin (HCC)  Patient is on long-term insulin therapy for type 1 diabetes    6. Right thyroid nodule  Stable  US was completed and FNAB was done it is benign  I am scheduling her for an ultrasound with radiology will discuss the test results when she comes back for follow-up      7. Essential hypertension  Her blood pressure today is uncontrolled I am increasing her captopril to 50 mg twice a day      Return in about 6 months     Total time spent on day of service was over 60 minutes which included obtaining a detailed history and physical exam, ordering labs, coordinating care and scheduling future follow-up and excluding procedure time (CGM download  and review)      Thank you kindly for allowing me to participate in the diabetes care plan for this patient.    Keny Robison MD, FACE, [facility]      CC:   Blaire Perez M.D.

## 2025-04-01 ENCOUNTER — TELEPHONE (OUTPATIENT)
Dept: ENDOCRINOLOGY | Facility: MEDICAL CENTER | Age: 75
End: 2025-04-01
Payer: MEDICARE

## 2025-04-01 NOTE — TELEPHONE ENCOUNTER
Patient called saying that [facility] has informed her that they are in need of a PA for her Aiden sensors. I advised pt that I would send a message to our pharmacy coordinators and will get back to her with any updates. Please advise.

## 2025-04-02 NOTE — TELEPHONE ENCOUNTER
Received Refill PA request via  EMR message for FreeStyle Aiden 2 Plus Sensor. (Quantity:2, Day Supply:30)  Insurance: SCP/[employer] D  Member ID: [id number]  BIN: [id number]  PCN: [id number]  [facility]   Ran Test claim via [place] & medication Rejects stating prior authorization is required.    Prior Authorization has been submitted via Cover My Meds: Key ([id number])  Will follow up in 24-48 business hours.     Thank you,  Mis Bolanos, [facility]  Endocrinology Pharmacy Coordinator

## 2025-04-03 ENCOUNTER — TELEPHONE (OUTPATIENT)
Dept: ENDOCRINOLOGY | Facility: MEDICAL CENTER | Age: 75
End: 2025-04-03
Payer: MEDICARE

## 2025-04-03 NOTE — TELEPHONE ENCOUNTER
Prior Authorization for FreeStyle Aiden 2 Plus Sensor has been APPROVED  Prior Authorization reference number: [id number]  Effective dates: 4/2/25-4/2/26  Copay: $30.57  Is patient eligible to fill with [facility]? Yes  Next Steps: I called patient @ [phone] and I advised her of the approval. Patient is currently refilling prescription at preferred pharmacy ([facility]/pharmacy #[id number]).     Thank you,  Mis Bolanos [facility]  Endocrinology Pharmacy Coordinator

## 2025-04-03 NOTE — TELEPHONE ENCOUNTER
I called patient @ [phone] and advised her that no PA is required for the [place]. I ran a test claim via [employer] and received a paid claim:   #1/90-day supply $13.92    Thank you,  Mis Bolanos, [facility]  Endocrinology Pharmacy Coordinator

## 2025-04-03 NOTE — TELEPHONE ENCOUNTER
Patient called saying that [facility] has informed her that they are in need of a PA for her Aiden reader. I advised pt that I would send a message to our pharmacy coordinators and will get back to her with any updates. Please advise.

## 2025-05-19 DIAGNOSIS — J44.9 CHRONIC OBSTRUCTIVE PULMONARY DISEASE, UNSPECIFIED COPD TYPE (HCC): ICD-10-CM

## 2025-05-19 RX ORDER — AZITHROMYCIN 250 MG/1
TABLET, FILM COATED ORAL
Qty: 6 TABLET | Refills: 0 | Status: SHIPPED | OUTPATIENT
Start: 2025-05-19

## 2025-05-19 RX ORDER — PREDNISONE 10 MG/1
TABLET ORAL
Qty: 18 TABLET | Refills: 0 | Status: SHIPPED | OUTPATIENT
Start: 2025-05-19

## 2025-05-19 NOTE — TELEPHONE ENCOUNTER
Patient called requesting refills for Azithromycin and Prednisone. Per patient, she would like to have some on hand for future emergency use.

## 2025-05-31 DIAGNOSIS — E10.65 UNCONTROLLED TYPE 1 DIABETES MELLITUS WITH HYPERGLYCEMIA (HCC): ICD-10-CM

## 2025-06-02 DIAGNOSIS — E10.65 UNCONTROLLED TYPE 1 DIABETES MELLITUS WITH HYPERGLYCEMIA (HCC): ICD-10-CM

## 2025-06-03 DIAGNOSIS — E10.65 UNCONTROLLED TYPE 1 DIABETES MELLITUS WITH HYPERGLYCEMIA (HCC): ICD-10-CM

## 2025-06-05 DIAGNOSIS — E10.65 UNCONTROLLED TYPE 1 DIABETES MELLITUS WITH HYPERGLYCEMIA (HCC): ICD-10-CM

## 2025-06-05 RX ORDER — BLOOD-GLUCOSE SENSOR
1 EACH MISCELLANEOUS
Qty: 6 EACH | Refills: 2 | Status: SHIPPED | OUTPATIENT
Start: 2025-06-05

## 2025-07-03 DIAGNOSIS — E03.9 ACQUIRED HYPOTHYROIDISM: ICD-10-CM

## 2025-07-03 RX ORDER — LEVOTHYROXINE SODIUM 112 UG/1
112 TABLET ORAL
Qty: 90 TABLET | Refills: 2 | Status: SHIPPED | OUTPATIENT
Start: 2025-07-03

## 2025-07-08 DIAGNOSIS — J45.41 MODERATE PERSISTENT ASTHMA WITH EXACERBATION: ICD-10-CM

## 2025-07-08 DIAGNOSIS — J44.1 COPD EXACERBATION (HCC): ICD-10-CM

## 2025-07-08 RX ORDER — IPRATROPIUM BROMIDE AND ALBUTEROL SULFATE 2.5; .5 MG/3ML; MG/3ML
3 SOLUTION RESPIRATORY (INHALATION) EVERY 6 HOURS PRN
Qty: 180 ML | Refills: 6 | Status: SHIPPED | OUTPATIENT
Start: 2025-07-08

## 2025-07-15 DIAGNOSIS — F32.0 CURRENT MILD EPISODE OF MAJOR DEPRESSIVE DISORDER WITHOUT PRIOR EPISODE (HCC): ICD-10-CM
